# Patient Record
Sex: MALE | Race: WHITE | HISPANIC OR LATINO | URBAN - METROPOLITAN AREA
[De-identification: names, ages, dates, MRNs, and addresses within clinical notes are randomized per-mention and may not be internally consistent; named-entity substitution may affect disease eponyms.]

---

## 2017-05-26 ENCOUNTER — EMERGENCY (EMERGENCY)
Facility: HOSPITAL | Age: 42
LOS: 1 days | End: 2017-05-26
Payer: OTHER GOVERNMENT

## 2017-05-26 DIAGNOSIS — S72.90XA UNSPECIFIED FRACTURE OF UNSPECIFIED FEMUR, INITIAL ENCOUNTER FOR CLOSED FRACTURE: Chronic | ICD-10-CM

## 2017-05-26 DIAGNOSIS — Z86.79 PERSONAL HISTORY OF OTHER DISEASES OF THE CIRCULATORY SYSTEM: Chronic | ICD-10-CM

## 2017-05-26 PROCEDURE — 71020: CPT | Mod: 26

## 2017-05-26 PROCEDURE — 73030 X-RAY EXAM OF SHOULDER: CPT | Mod: 26,LT

## 2017-05-26 PROCEDURE — 99284 EMERGENCY DEPT VISIT MOD MDM: CPT

## 2017-12-20 ENCOUNTER — EMERGENCY (EMERGENCY)
Facility: HOSPITAL | Age: 42
LOS: 1 days | Discharge: HOME | End: 2017-12-20
Attending: EMERGENCY MEDICINE
Payer: MEDICAID

## 2017-12-20 ENCOUNTER — APPOINTMENT (OUTPATIENT)
Dept: NEUROLOGY | Facility: CLINIC | Age: 42
End: 2017-12-20
Payer: MEDICAID

## 2017-12-20 VITALS
RESPIRATION RATE: 20 BRPM | HEIGHT: 67 IN | HEART RATE: 103 BPM | SYSTOLIC BLOOD PRESSURE: 144 MMHG | DIASTOLIC BLOOD PRESSURE: 97 MMHG | OXYGEN SATURATION: 97 % | TEMPERATURE: 98 F | WEIGHT: 167.99 LBS

## 2017-12-20 VITALS
HEIGHT: 67 IN | BODY MASS INDEX: 26.37 KG/M2 | WEIGHT: 168 LBS | DIASTOLIC BLOOD PRESSURE: 90 MMHG | SYSTOLIC BLOOD PRESSURE: 136 MMHG

## 2017-12-20 VITALS
OXYGEN SATURATION: 98 % | DIASTOLIC BLOOD PRESSURE: 90 MMHG | SYSTOLIC BLOOD PRESSURE: 139 MMHG | RESPIRATION RATE: 20 BRPM | HEART RATE: 87 BPM

## 2017-12-20 DIAGNOSIS — S72.90XA UNSPECIFIED FRACTURE OF UNSPECIFIED FEMUR, INITIAL ENCOUNTER FOR CLOSED FRACTURE: Chronic | ICD-10-CM

## 2017-12-20 DIAGNOSIS — Z86.79 PERSONAL HISTORY OF OTHER DISEASES OF THE CIRCULATORY SYSTEM: Chronic | ICD-10-CM

## 2017-12-20 DIAGNOSIS — F17.200 NICOTINE DEPENDENCE, UNSPECIFIED, UNCOMPLICATED: ICD-10-CM

## 2017-12-20 DIAGNOSIS — G44.221 CHRONIC TENSION-TYPE HEADACHE, INTRACTABLE: ICD-10-CM

## 2017-12-20 LAB
ALBUMIN SERPL ELPH-MCNC: 4.5 G/DL — SIGNIFICANT CHANGE UP (ref 3.3–5.2)
ALP SERPL-CCNC: 78 U/L — SIGNIFICANT CHANGE UP (ref 40–120)
ALT FLD-CCNC: 23 U/L — SIGNIFICANT CHANGE UP
ANION GAP SERPL CALC-SCNC: 15 MMOL/L — SIGNIFICANT CHANGE UP (ref 5–17)
AST SERPL-CCNC: 20 U/L — SIGNIFICANT CHANGE UP
BASOPHILS # BLD AUTO: 0 K/UL — SIGNIFICANT CHANGE UP (ref 0–0.2)
BASOPHILS NFR BLD AUTO: 0.3 % — SIGNIFICANT CHANGE UP (ref 0–2)
BILIRUB SERPL-MCNC: 0.2 MG/DL — LOW (ref 0.4–2)
BUN SERPL-MCNC: 7 MG/DL — LOW (ref 8–20)
CALCIUM SERPL-MCNC: 9.2 MG/DL — SIGNIFICANT CHANGE UP (ref 8.6–10.2)
CHLORIDE SERPL-SCNC: 101 MMOL/L — SIGNIFICANT CHANGE UP (ref 98–107)
CO2 SERPL-SCNC: 27 MMOL/L — SIGNIFICANT CHANGE UP (ref 22–29)
CREAT SERPL-MCNC: 0.75 MG/DL — SIGNIFICANT CHANGE UP (ref 0.5–1.3)
EOSINOPHIL # BLD AUTO: 0.1 K/UL — SIGNIFICANT CHANGE UP (ref 0–0.5)
EOSINOPHIL NFR BLD AUTO: 1.1 % — SIGNIFICANT CHANGE UP (ref 0–5)
GLUCOSE SERPL-MCNC: 94 MG/DL — SIGNIFICANT CHANGE UP (ref 70–115)
HCT VFR BLD CALC: 46.6 % — SIGNIFICANT CHANGE UP (ref 42–52)
HGB BLD-MCNC: 16.2 G/DL — SIGNIFICANT CHANGE UP (ref 14–18)
LIDOCAIN IGE QN: 34 U/L — SIGNIFICANT CHANGE UP (ref 22–51)
LYMPHOCYTES # BLD AUTO: 2.9 K/UL — SIGNIFICANT CHANGE UP (ref 1–4.8)
LYMPHOCYTES # BLD AUTO: 28.5 % — SIGNIFICANT CHANGE UP (ref 20–55)
MCHC RBC-ENTMCNC: 31.4 PG — HIGH (ref 27–31)
MCHC RBC-ENTMCNC: 34.8 G/DL — SIGNIFICANT CHANGE UP (ref 32–36)
MCV RBC AUTO: 90.3 FL — SIGNIFICANT CHANGE UP (ref 80–94)
MONOCYTES # BLD AUTO: 0.7 K/UL — SIGNIFICANT CHANGE UP (ref 0–0.8)
MONOCYTES NFR BLD AUTO: 6.5 % — SIGNIFICANT CHANGE UP (ref 3–10)
NEUTROPHILS # BLD AUTO: 6.5 K/UL — SIGNIFICANT CHANGE UP (ref 1.8–8)
NEUTROPHILS NFR BLD AUTO: 63.4 % — SIGNIFICANT CHANGE UP (ref 37–73)
PLATELET # BLD AUTO: 317 K/UL — SIGNIFICANT CHANGE UP (ref 150–400)
POTASSIUM SERPL-MCNC: 3.8 MMOL/L — SIGNIFICANT CHANGE UP (ref 3.5–5.3)
POTASSIUM SERPL-SCNC: 3.8 MMOL/L — SIGNIFICANT CHANGE UP (ref 3.5–5.3)
PROT SERPL-MCNC: 7.3 G/DL — SIGNIFICANT CHANGE UP (ref 6.6–8.7)
RBC # BLD: 5.16 M/UL — SIGNIFICANT CHANGE UP (ref 4.6–6.2)
RBC # FLD: 13.9 % — SIGNIFICANT CHANGE UP (ref 11–15.6)
SODIUM SERPL-SCNC: 143 MMOL/L — SIGNIFICANT CHANGE UP (ref 135–145)
WBC # BLD: 10.3 K/UL — SIGNIFICANT CHANGE UP (ref 4.8–10.8)
WBC # FLD AUTO: 10.3 K/UL — SIGNIFICANT CHANGE UP (ref 4.8–10.8)

## 2017-12-20 PROCEDURE — 99283 EMERGENCY DEPT VISIT LOW MDM: CPT

## 2017-12-20 PROCEDURE — 99204 OFFICE O/P NEW MOD 45 MIN: CPT

## 2017-12-20 RX ORDER — MORPHINE SULFATE 50 MG/1
5 CAPSULE, EXTENDED RELEASE ORAL ONCE
Qty: 0 | Refills: 0 | Status: DISCONTINUED | OUTPATIENT
Start: 2017-12-20 | End: 2017-12-20

## 2017-12-20 RX ORDER — PANTOPRAZOLE SODIUM 20 MG/1
40 TABLET, DELAYED RELEASE ORAL ONCE
Qty: 0 | Refills: 0 | Status: COMPLETED | OUTPATIENT
Start: 2017-12-20 | End: 2017-12-20

## 2017-12-20 RX ORDER — SODIUM CHLORIDE 9 MG/ML
1000 INJECTION INTRAMUSCULAR; INTRAVENOUS; SUBCUTANEOUS ONCE
Qty: 0 | Refills: 0 | Status: COMPLETED | OUTPATIENT
Start: 2017-12-20 | End: 2017-12-20

## 2017-12-20 RX ORDER — KETOROLAC TROMETHAMINE 30 MG/ML
30 SYRINGE (ML) INJECTION ONCE
Qty: 0 | Refills: 0 | Status: DISCONTINUED | OUTPATIENT
Start: 2017-12-20 | End: 2017-12-20

## 2017-12-20 RX ORDER — SODIUM CHLORIDE 9 MG/ML
3 INJECTION INTRAMUSCULAR; INTRAVENOUS; SUBCUTANEOUS ONCE
Qty: 0 | Refills: 0 | Status: COMPLETED | OUTPATIENT
Start: 2017-12-20 | End: 2017-12-20

## 2017-12-20 RX ADMIN — SODIUM CHLORIDE 1000 MILLILITER(S): 9 INJECTION INTRAMUSCULAR; INTRAVENOUS; SUBCUTANEOUS at 21:35

## 2017-12-20 RX ADMIN — SODIUM CHLORIDE 3 MILLILITER(S): 9 INJECTION INTRAMUSCULAR; INTRAVENOUS; SUBCUTANEOUS at 21:35

## 2017-12-20 RX ADMIN — PANTOPRAZOLE SODIUM 40 MILLIGRAM(S): 20 TABLET, DELAYED RELEASE ORAL at 21:35

## 2017-12-20 RX ADMIN — MORPHINE SULFATE 5 MILLIGRAM(S): 50 CAPSULE, EXTENDED RELEASE ORAL at 21:35

## 2017-12-20 NOTE — ED STATDOCS - PROGRESS NOTE DETAILS
PA NOTE: Pt discussed at length with attending HPI/ROS/PE confirmed. PT seen resting computably in no acute distress in chair, pt states that his pain has increased and would like pain medication, will order Toradol and revaluate. PA NOTE: PT seen resting comfortably in no acute distress, no acute complaints at this time, PT tolerating PO intake,  PT informed of all results,  pt states that he had improvement with medication,  PT will be DC home with follow up to GI, IBU, pt educated about when to return to the ED if needed. PT verbalizes that he understands all instructions and results.

## 2017-12-20 NOTE — ED STATDOCS - OBJECTIVE STATEMENT
Pt is 43 y/o M presents to ED c/o of ABD pain x 1 week with hx of Crohns disease (2005); has been non-compliant with medications. Last flare-up was approximately 1 year ago. Pain is described as sharp. Denies N/V/D. Pt is former smoker and occasional ETOH. No further complaints at this time.  PMD: Belkys

## 2017-12-21 PROCEDURE — 83690 ASSAY OF LIPASE: CPT

## 2017-12-21 PROCEDURE — 74177 CT ABD & PELVIS W/CONTRAST: CPT

## 2017-12-21 PROCEDURE — 80053 COMPREHEN METABOLIC PANEL: CPT

## 2017-12-21 PROCEDURE — 99284 EMERGENCY DEPT VISIT MOD MDM: CPT | Mod: 25

## 2017-12-21 PROCEDURE — 74177 CT ABD & PELVIS W/CONTRAST: CPT | Mod: 26

## 2017-12-21 PROCEDURE — 36415 COLL VENOUS BLD VENIPUNCTURE: CPT

## 2017-12-21 PROCEDURE — 85027 COMPLETE CBC AUTOMATED: CPT

## 2017-12-21 PROCEDURE — 96374 THER/PROPH/DIAG INJ IV PUSH: CPT | Mod: XU

## 2017-12-21 PROCEDURE — 96375 TX/PRO/DX INJ NEW DRUG ADDON: CPT

## 2017-12-21 RX ORDER — IBUPROFEN 200 MG
1 TABLET ORAL
Qty: 15 | Refills: 0 | OUTPATIENT
Start: 2017-12-21 | End: 2017-12-25

## 2017-12-21 RX ORDER — IBUPROFEN 200 MG
1 TABLET ORAL
Qty: 15 | Refills: 0 | DISCHARGE
Start: 2017-12-21 | End: 2017-12-25

## 2017-12-21 RX ORDER — FAMOTIDINE 10 MG/ML
1 INJECTION INTRAVENOUS
Qty: 14 | Refills: 0
Start: 2017-12-21 | End: 2018-01-03

## 2017-12-21 RX ORDER — OMEPRAZOLE 10 MG/1
1 CAPSULE, DELAYED RELEASE ORAL
Qty: 21 | Refills: 0 | OUTPATIENT
Start: 2017-12-21 | End: 2018-01-10

## 2017-12-21 RX ADMIN — Medication 30 MILLIGRAM(S): at 00:37

## 2018-11-02 ENCOUNTER — INPATIENT (INPATIENT)
Facility: HOSPITAL | Age: 43
LOS: 0 days | Discharge: LEFT BEFORE DISCHARGE | DRG: 71 | End: 2018-11-02
Attending: FAMILY MEDICINE | Admitting: HOSPITALIST
Payer: MEDICAID

## 2018-11-02 VITALS
OXYGEN SATURATION: 99 % | DIASTOLIC BLOOD PRESSURE: 78 MMHG | HEART RATE: 71 BPM | TEMPERATURE: 99 F | RESPIRATION RATE: 18 BRPM | SYSTOLIC BLOOD PRESSURE: 118 MMHG

## 2018-11-02 VITALS
OXYGEN SATURATION: 97 % | RESPIRATION RATE: 18 BRPM | HEART RATE: 90 BPM | SYSTOLIC BLOOD PRESSURE: 141 MMHG | DIASTOLIC BLOOD PRESSURE: 92 MMHG | HEIGHT: 67 IN | WEIGHT: 177.91 LBS | TEMPERATURE: 98 F

## 2018-11-02 DIAGNOSIS — G45.9 TRANSIENT CEREBRAL ISCHEMIC ATTACK, UNSPECIFIED: ICD-10-CM

## 2018-11-02 DIAGNOSIS — R47.89 OTHER SPEECH DISTURBANCES: ICD-10-CM

## 2018-11-02 DIAGNOSIS — Z86.79 PERSONAL HISTORY OF OTHER DISEASES OF THE CIRCULATORY SYSTEM: Chronic | ICD-10-CM

## 2018-11-02 DIAGNOSIS — R47.01 APHASIA: ICD-10-CM

## 2018-11-02 DIAGNOSIS — S72.90XA UNSPECIFIED FRACTURE OF UNSPECIFIED FEMUR, INITIAL ENCOUNTER FOR CLOSED FRACTURE: Chronic | ICD-10-CM

## 2018-11-02 LAB
ALBUMIN SERPL ELPH-MCNC: 4.3 G/DL — SIGNIFICANT CHANGE UP (ref 3.3–5.2)
ALP SERPL-CCNC: 94 U/L — SIGNIFICANT CHANGE UP (ref 40–120)
ALT FLD-CCNC: 18 U/L — SIGNIFICANT CHANGE UP
AMMONIA BLD-MCNC: 55 UMOL/L — SIGNIFICANT CHANGE UP (ref 11–55)
ANION GAP SERPL CALC-SCNC: 10 MMOL/L — SIGNIFICANT CHANGE UP (ref 5–17)
APTT BLD: 40.1 SEC — HIGH (ref 27.5–36.3)
AST SERPL-CCNC: 18 U/L — SIGNIFICANT CHANGE UP
BASOPHILS # BLD AUTO: 0 K/UL — SIGNIFICANT CHANGE UP (ref 0–0.2)
BASOPHILS NFR BLD AUTO: 0.1 % — SIGNIFICANT CHANGE UP (ref 0–2)
BILIRUB SERPL-MCNC: <0.2 MG/DL — LOW (ref 0.4–2)
BUN SERPL-MCNC: 9 MG/DL — SIGNIFICANT CHANGE UP (ref 8–20)
CALCIUM SERPL-MCNC: 9.2 MG/DL — SIGNIFICANT CHANGE UP (ref 8.6–10.2)
CHLORIDE SERPL-SCNC: 100 MMOL/L — SIGNIFICANT CHANGE UP (ref 98–107)
CHOLEST SERPL-MCNC: 222 MG/DL — HIGH (ref 110–199)
CO2 SERPL-SCNC: 29 MMOL/L — SIGNIFICANT CHANGE UP (ref 22–29)
CREAT SERPL-MCNC: 0.91 MG/DL — SIGNIFICANT CHANGE UP (ref 0.5–1.3)
EOSINOPHIL # BLD AUTO: 0.1 K/UL — SIGNIFICANT CHANGE UP (ref 0–0.5)
EOSINOPHIL NFR BLD AUTO: 1.2 % — SIGNIFICANT CHANGE UP (ref 0–6)
ETHANOL SERPL-MCNC: <10 MG/DL — SIGNIFICANT CHANGE UP
GLUCOSE SERPL-MCNC: 111 MG/DL — SIGNIFICANT CHANGE UP (ref 70–115)
HBA1C BLD-MCNC: 5.8 % — HIGH (ref 4–5.6)
HCT VFR BLD CALC: 44.5 % — SIGNIFICANT CHANGE UP (ref 42–52)
HDLC SERPL-MCNC: 38 MG/DL — LOW
HGB BLD-MCNC: 15 G/DL — SIGNIFICANT CHANGE UP (ref 14–18)
INR BLD: 1.12 RATIO — SIGNIFICANT CHANGE UP (ref 0.88–1.16)
LIPID PNL WITH DIRECT LDL SERPL: 152 MG/DL — SIGNIFICANT CHANGE UP
LYMPHOCYTES # BLD AUTO: 2.7 K/UL — SIGNIFICANT CHANGE UP (ref 1–4.8)
LYMPHOCYTES # BLD AUTO: 26.8 % — SIGNIFICANT CHANGE UP (ref 20–55)
MCHC RBC-ENTMCNC: 30.1 PG — SIGNIFICANT CHANGE UP (ref 27–31)
MCHC RBC-ENTMCNC: 33.7 G/DL — SIGNIFICANT CHANGE UP (ref 32–36)
MCV RBC AUTO: 89.4 FL — SIGNIFICANT CHANGE UP (ref 80–94)
MONOCYTES # BLD AUTO: 0.6 K/UL — SIGNIFICANT CHANGE UP (ref 0–0.8)
MONOCYTES NFR BLD AUTO: 6 % — SIGNIFICANT CHANGE UP (ref 3–10)
NEUTROPHILS # BLD AUTO: 6.5 K/UL — SIGNIFICANT CHANGE UP (ref 1.8–8)
NEUTROPHILS NFR BLD AUTO: 65.7 % — SIGNIFICANT CHANGE UP (ref 37–73)
PLATELET # BLD AUTO: 406 K/UL — HIGH (ref 150–400)
POTASSIUM SERPL-MCNC: 4 MMOL/L — SIGNIFICANT CHANGE UP (ref 3.5–5.3)
POTASSIUM SERPL-SCNC: 4 MMOL/L — SIGNIFICANT CHANGE UP (ref 3.5–5.3)
PROT SERPL-MCNC: 7.4 G/DL — SIGNIFICANT CHANGE UP (ref 6.6–8.7)
PROTHROM AB SERPL-ACNC: 12.9 SEC — SIGNIFICANT CHANGE UP (ref 10–12.9)
RBC # BLD: 4.98 M/UL — SIGNIFICANT CHANGE UP (ref 4.6–6.2)
RBC # FLD: 13 % — SIGNIFICANT CHANGE UP (ref 11–15.6)
SODIUM SERPL-SCNC: 139 MMOL/L — SIGNIFICANT CHANGE UP (ref 135–145)
TOTAL CHOLESTEROL/HDL RATIO MEASUREMENT: 6 RATIO — SIGNIFICANT CHANGE UP (ref 3.4–9.6)
TRIGL SERPL-MCNC: 161 MG/DL — SIGNIFICANT CHANGE UP (ref 10–200)
TROPONIN T SERPL-MCNC: <0.01 NG/ML — SIGNIFICANT CHANGE UP (ref 0–0.06)
WBC # BLD: 9.9 K/UL — SIGNIFICANT CHANGE UP (ref 4.8–10.8)
WBC # FLD AUTO: 9.9 K/UL — SIGNIFICANT CHANGE UP (ref 4.8–10.8)

## 2018-11-02 PROCEDURE — 99223 1ST HOSP IP/OBS HIGH 75: CPT

## 2018-11-02 PROCEDURE — 93010 ELECTROCARDIOGRAM REPORT: CPT

## 2018-11-02 PROCEDURE — 95819 EEG AWAKE AND ASLEEP: CPT | Mod: 26

## 2018-11-02 PROCEDURE — 99285 EMERGENCY DEPT VISIT HI MDM: CPT | Mod: 25

## 2018-11-02 PROCEDURE — 70450 CT HEAD/BRAIN W/O DYE: CPT | Mod: 26

## 2018-11-02 PROCEDURE — 12345: CPT | Mod: NC

## 2018-11-02 RX ORDER — ASPIRIN/CALCIUM CARB/MAGNESIUM 324 MG
81 TABLET ORAL DAILY
Qty: 0 | Refills: 0 | Status: DISCONTINUED | OUTPATIENT
Start: 2018-11-02 | End: 2018-11-02

## 2018-11-02 RX ORDER — FAMOTIDINE 10 MG/ML
20 INJECTION INTRAVENOUS DAILY
Qty: 0 | Refills: 0 | Status: DISCONTINUED | OUTPATIENT
Start: 2018-11-02 | End: 2018-11-02

## 2018-11-02 RX ORDER — ENOXAPARIN SODIUM 100 MG/ML
40 INJECTION SUBCUTANEOUS EVERY 24 HOURS
Qty: 0 | Refills: 0 | Status: DISCONTINUED | OUTPATIENT
Start: 2018-11-02 | End: 2018-11-02

## 2018-11-02 RX ORDER — ATORVASTATIN CALCIUM 80 MG/1
40 TABLET, FILM COATED ORAL AT BEDTIME
Qty: 0 | Refills: 0 | Status: DISCONTINUED | OUTPATIENT
Start: 2018-11-02 | End: 2018-11-02

## 2018-11-02 RX ADMIN — ENOXAPARIN SODIUM 40 MILLIGRAM(S): 100 INJECTION SUBCUTANEOUS at 09:52

## 2018-11-02 RX ADMIN — Medication 81 MILLIGRAM(S): at 05:00

## 2018-11-02 NOTE — ED PROVIDER NOTE - PROGRESS NOTE DETAILS
Spoke with FLAVIA Arnold, will have tele-neurologist call back Spoke with Dr. Dannie Tracey, repeated neuro exam, does not recommend Tpa at this time. Discussed need to admit with patient & discussed risk and benefits.  Patient agreed to admission.  Discussed case w/ admitting medicine doctor - agreed to admit to their service.

## 2018-11-02 NOTE — CONSULT NOTE ADULT - ASSESSMENT
The patient is a 42y Male with episode of blacking out.     Syncope vs TIA vs seizure.   Agree with checking EEG.  Agree with MRI brain.   Continue antiplatelet.   Check lipid panel. Statin if LDL>70.    Substance use.   Agree with toxicology screen if specimen can be obtained.   Counseled to discontinue marijuana.     Case discussed with Hospitalist.

## 2018-11-02 NOTE — STROKE CODE NOTE - PROBLEM SELECTOR PLAN 1
Plan:  - Risks, benefits, and adverse reactions associated with IV tPA including hemorrhagic stroke were discussed with patient and family members in detail. No indications for IV tPA as patient on repeated occasions confirm that he is back to normal and non focal neurological examination   - Not a candidate for mechanical embolectomy due to NIHSS 0   - Aspirin for secondary stroke prevention  - Atorvastatin 80 mg q HS once able to pass the swallow evaluation   - MRI of brain without contrast/MRA head and neck with and without contrast  - Consider transthoracic echocardiogram with bubble study and telemetry to screen for possible cardiac source of embolism  - HbA1c and LDL  - Routine EEG   - Further evaluation and management as per the MRI results and neurologist evaluation at OSH     Above-mentioned plan was discussed with patient, available family member and [facility] ED MD in detail. All the questions were answered and concerns were addressed.
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
## 2018-11-02 NOTE — DISCHARGE NOTE ADULT - CARE PLAN
Principal Discharge DX:	Aphasia  Goal:	need eval with MRI  Assessment and plan of treatment:	need MRI - pt left AMA
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
## 2018-11-02 NOTE — STROKE CODE NOTE - ASSESSMENT/PLAN
42 years old man with history of Crohn's disease is evaluated at OSH for acute onset of "language disturbance.". He was reported to be normal at around 12:20 AM when he went to his room to go to bed. He called 911 due to unknown reasons. When his mother went to check on him in his room, he was found unconscious, but upon regaining his consciousness, he was noted to have confusion, disorientation and trouble getting the words out of his mouth. He was brought to [facility] for further evaluation. He reports complete resolution of his neurological symptoms. Examination through telemetry stroke is grossly nonfocal.    Impression:  his presentation is consistent with transient left hemispheric dysfunction, which could likely be related to left MCA distribution TIA (likely etiology being cerebral embolism from undetermined source, but possibility of large vessel disease needs to be ruled out) versus post ictal state from unwitnessed seizure

## 2018-11-02 NOTE — ED ADULT NURSE NOTE - NSIMPLEMENTINTERV_GEN_ALL_ED
Implemented All Universal Safety Interventions:  [place] to call system. Call bell, personal items and telephone within reach. Instruct patient to call for assistance. Room bathroom lighting operational. Non-slip footwear when patient is off stretcher. Physically safe environment: no spills, clutter or unnecessary equipment. Stretcher in lowest position, wheels locked, appropriate side rails in place.
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
## 2018-11-02 NOTE — DISCHARGE NOTE ADULT - PATIENT PORTAL LINK FT
You can access the "[employer] Patient Portal, offered by [facility], by registering with the following website: [email or web address]

## 2018-11-02 NOTE — ED ADULT TRIAGE NOTE - CHIEF COMPLAINT QUOTE
pt states does not feel right had a cigarette and woke up on floor in bedroom ems reports as for family patient was last normal at 100am  md called at bedside for stroke evaluation at 200am . gonzalez noted to all extremities equal strength ,  pt with difficulty with finding words but answer questions appropriately ,  hospital did not receive pre stroke notification by ems, ems states upon arrival patient non verbal with blank stare pt admits to having 1 drink around 11pm last

## 2018-11-02 NOTE — ED ADULT NURSE NOTE - OBJECTIVE STATEMENT
Code Team 2 activated for code stroke called on patient. Patient presents slow to respond but able to articulate words, NIH=1. Patient a&ox3, follows commands, nsr on cm. As per mother, last known well is 0020 when patient retires to bedroom. Patient states last he remember was he was just smoking a cigarette, patient later admits he was smoking weed and drinking. Patient had a PMH: Subdural hematoma. Patient states he woke up and realized he passed out, thus he called 911. Patient denies chest pain, sob, dizziness, headache, chills. Patient with no change in level of consciousness, not in distress. To continue to monitor.
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
## 2018-11-02 NOTE — H&P ADULT - NSHPPHYSICALEXAM_GEN_ALL_CORE
Vital Signs Last 24 Hrs  T(C): 36.1 (02 Nov 2018 02:37), Max: 36.8 (02 Nov 2018 02:00)  T(F): 97 (02 Nov 2018 02:37), Max: 98.3 (02 Nov 2018 02:00)  HR: 85 (02 Nov 2018 02:37) (83 - 100)  BP: 132/81 (02 Nov 2018 02:37) (132/81 - 169/94)  BP(mean): --  RR: 20 (02 Nov 2018 02:37) (18 - 20)  SpO2: 98% (02 Nov 2018 02:37) (95% - 98%)    GENERAL:  Well-appearing, not in acute distress  EYES:  Clear conjunctiva, pupils reactive to light, extraocular movement intact, slight horizontal nystagmnus, no gaze deviation   ENT: Moist mucous membranes  RESP:  Non-labored breathing pattern, lungs clear to ausculation   CV: Regular rate and rhythm, no murmurs appreciated, no lower extremity edema  GI: Soft, non-tender, non-distended  NEURO: Awake, alert, conversant, no facial asymmetry, tremor, pronator drift or dysmetria, upper and lower extremity strength 5/5, light touch sensation grossly intact on face and extremities  PSYCH: Calm, cooperative  SKIN: No rash or lesions, warm and dry
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
## 2018-11-02 NOTE — STROKE CODE NOTE - SUBJECTIVE
42 years old man was reported to be last seen normal by his mother at around 12:20 AM when he went to his room to go to bed. He reports to have called 911 thereafter but he was not sure about the reason for calling 911. Upon arrival to [facility], when his mother went to check on him in his room; he was found down on the ground. Upon regaining his consciousness, he was noted to have confusion, disorientation, and language disturbance, prompting his presentation to OSH. Since his arrival to the OSH, he reports to have complete resolution of his neurological symptoms/deficits and reports feeling back to normal.

## 2018-11-02 NOTE — H&P ADULT - NSHPLABSRESULTS_GEN_ALL_CORE
15.0   9.9   )-----------( 406      ( 02 Nov 2018 02:28 )             44.5       11-02    139  |  100  |  9.0  ----------------------------<  111  4.0   |  29.0  |  0.91    Ca    9.2      02 Nov 2018 02:28    TPro  7.4  /  Alb  4.3  /  TBili  <0.2<L>  /  DBili  x   /  AST  18  /  ALT  18  /  AlkPhos  94  11-02    EKG: NSR, HR 84, QTc 470, no ST-T changes

## 2018-11-07 LAB — DRUG SCREEN, SERUM: ABNORMAL

## 2018-12-06 NOTE — ED ADULT TRIAGE NOTE - SOURCE OF INFORMATION
Patient Purse String (Simple) Text: Given the location of the defect and the characteristics of the surrounding skin a purse string simple closure was deemed most appropriate.  Undermining was performed circumferentially around the surgical defect.  A purse string suture was then placed and tightened.

## 2019-01-16 ENCOUNTER — EMERGENCY (EMERGENCY)
Facility: HOSPITAL | Age: 44
LOS: 1 days | Discharge: LEFT BEFORE DISCHARGE | End: 2019-01-16
Attending: EMERGENCY MEDICINE
Payer: MEDICAID

## 2019-01-16 VITALS
TEMPERATURE: 98 F | RESPIRATION RATE: 20 BRPM | HEART RATE: 87 BPM | SYSTOLIC BLOOD PRESSURE: 116 MMHG | WEIGHT: 169.98 LBS | HEIGHT: 67 IN | DIASTOLIC BLOOD PRESSURE: 83 MMHG | OXYGEN SATURATION: 98 %

## 2019-01-16 DIAGNOSIS — Z86.79 PERSONAL HISTORY OF OTHER DISEASES OF THE CIRCULATORY SYSTEM: Chronic | ICD-10-CM

## 2019-01-16 DIAGNOSIS — S72.90XA UNSPECIFIED FRACTURE OF UNSPECIFIED FEMUR, INITIAL ENCOUNTER FOR CLOSED FRACTURE: Chronic | ICD-10-CM

## 2019-01-16 PROCEDURE — 99284 EMERGENCY DEPT VISIT MOD MDM: CPT | Mod: 25

## 2019-01-17 VITALS
SYSTOLIC BLOOD PRESSURE: 106 MMHG | DIASTOLIC BLOOD PRESSURE: 71 MMHG | RESPIRATION RATE: 18 BRPM | OXYGEN SATURATION: 98 % | TEMPERATURE: 98 F | HEART RATE: 71 BPM

## 2019-01-17 PROBLEM — K21.9 GASTRO-ESOPHAGEAL REFLUX DISEASE WITHOUT ESOPHAGITIS: Chronic | Status: ACTIVE | Noted: 2018-11-02

## 2019-01-17 LAB
ALBUMIN SERPL ELPH-MCNC: 4 G/DL — SIGNIFICANT CHANGE UP (ref 3.3–5.2)
ALP SERPL-CCNC: 84 U/L — SIGNIFICANT CHANGE UP (ref 40–120)
ALT FLD-CCNC: 22 U/L — SIGNIFICANT CHANGE UP
ANION GAP SERPL CALC-SCNC: 12 MMOL/L — SIGNIFICANT CHANGE UP (ref 5–17)
APTT BLD: 34.2 SEC — SIGNIFICANT CHANGE UP (ref 27.5–36.3)
AST SERPL-CCNC: 19 U/L — SIGNIFICANT CHANGE UP
BASOPHILS # BLD AUTO: 0 K/UL — SIGNIFICANT CHANGE UP (ref 0–0.2)
BASOPHILS NFR BLD AUTO: 0.2 % — SIGNIFICANT CHANGE UP (ref 0–2)
BILIRUB SERPL-MCNC: <0.2 MG/DL — LOW (ref 0.4–2)
BUN SERPL-MCNC: 12 MG/DL — SIGNIFICANT CHANGE UP (ref 8–20)
CALCIUM SERPL-MCNC: 9.1 MG/DL — SIGNIFICANT CHANGE UP (ref 8.6–10.2)
CHLORIDE SERPL-SCNC: 106 MMOL/L — SIGNIFICANT CHANGE UP (ref 98–107)
CO2 SERPL-SCNC: 26 MMOL/L — SIGNIFICANT CHANGE UP (ref 22–29)
CREAT SERPL-MCNC: 0.65 MG/DL — SIGNIFICANT CHANGE UP (ref 0.5–1.3)
EOSINOPHIL # BLD AUTO: 0.1 K/UL — SIGNIFICANT CHANGE UP (ref 0–0.5)
EOSINOPHIL NFR BLD AUTO: 1.1 % — SIGNIFICANT CHANGE UP (ref 0–6)
GLUCOSE SERPL-MCNC: 108 MG/DL — SIGNIFICANT CHANGE UP (ref 70–115)
HCT VFR BLD CALC: 46.3 % — SIGNIFICANT CHANGE UP (ref 42–52)
HGB BLD-MCNC: 15.2 G/DL — SIGNIFICANT CHANGE UP (ref 14–18)
INR BLD: 1.08 RATIO — SIGNIFICANT CHANGE UP (ref 0.88–1.16)
LACTATE BLDV-MCNC: 1 MMOL/L — SIGNIFICANT CHANGE UP (ref 0.5–2)
LIDOCAIN IGE QN: 23 U/L — SIGNIFICANT CHANGE UP (ref 22–51)
LYMPHOCYTES # BLD AUTO: 2.4 K/UL — SIGNIFICANT CHANGE UP (ref 1–4.8)
LYMPHOCYTES # BLD AUTO: 27.2 % — SIGNIFICANT CHANGE UP (ref 20–55)
MAGNESIUM SERPL-MCNC: 2.1 MG/DL — SIGNIFICANT CHANGE UP (ref 1.8–2.6)
MCHC RBC-ENTMCNC: 29.6 PG — SIGNIFICANT CHANGE UP (ref 27–31)
MCHC RBC-ENTMCNC: 32.8 G/DL — SIGNIFICANT CHANGE UP (ref 32–36)
MCV RBC AUTO: 90.1 FL — SIGNIFICANT CHANGE UP (ref 80–94)
MONOCYTES # BLD AUTO: 0.6 K/UL — SIGNIFICANT CHANGE UP (ref 0–0.8)
MONOCYTES NFR BLD AUTO: 6.3 % — SIGNIFICANT CHANGE UP (ref 3–10)
NEUTROPHILS # BLD AUTO: 5.7 K/UL — SIGNIFICANT CHANGE UP (ref 1.8–8)
NEUTROPHILS NFR BLD AUTO: 65.1 % — SIGNIFICANT CHANGE UP (ref 37–73)
PLATELET # BLD AUTO: 325 K/UL — SIGNIFICANT CHANGE UP (ref 150–400)
POTASSIUM SERPL-MCNC: 3.6 MMOL/L — SIGNIFICANT CHANGE UP (ref 3.5–5.3)
POTASSIUM SERPL-SCNC: 3.6 MMOL/L — SIGNIFICANT CHANGE UP (ref 3.5–5.3)
PROT SERPL-MCNC: 7.1 G/DL — SIGNIFICANT CHANGE UP (ref 6.6–8.7)
PROTHROM AB SERPL-ACNC: 12.4 SEC — SIGNIFICANT CHANGE UP (ref 10–12.9)
RBC # BLD: 5.14 M/UL — SIGNIFICANT CHANGE UP (ref 4.6–6.2)
RBC # FLD: 13.4 % — SIGNIFICANT CHANGE UP (ref 11–15.6)
SODIUM SERPL-SCNC: 144 MMOL/L — SIGNIFICANT CHANGE UP (ref 135–145)
WBC # BLD: 8.8 K/UL — SIGNIFICANT CHANGE UP (ref 4.8–10.8)
WBC # FLD AUTO: 8.8 K/UL — SIGNIFICANT CHANGE UP (ref 4.8–10.8)

## 2019-01-17 PROCEDURE — 36415 COLL VENOUS BLD VENIPUNCTURE: CPT

## 2019-01-17 PROCEDURE — 96374 THER/PROPH/DIAG INJ IV PUSH: CPT | Mod: XU

## 2019-01-17 PROCEDURE — 83735 ASSAY OF MAGNESIUM: CPT

## 2019-01-17 PROCEDURE — 80053 COMPREHEN METABOLIC PANEL: CPT

## 2019-01-17 PROCEDURE — 85730 THROMBOPLASTIN TIME PARTIAL: CPT

## 2019-01-17 PROCEDURE — 99284 EMERGENCY DEPT VISIT MOD MDM: CPT | Mod: 25

## 2019-01-17 PROCEDURE — 83690 ASSAY OF LIPASE: CPT

## 2019-01-17 PROCEDURE — 85610 PROTHROMBIN TIME: CPT

## 2019-01-17 PROCEDURE — 83605 ASSAY OF LACTIC ACID: CPT

## 2019-01-17 PROCEDURE — 85027 COMPLETE CBC AUTOMATED: CPT

## 2019-01-17 RX ORDER — SODIUM CHLORIDE 9 MG/ML
1000 INJECTION INTRAMUSCULAR; INTRAVENOUS; SUBCUTANEOUS ONCE
Qty: 0 | Refills: 0 | Status: COMPLETED | OUTPATIENT
Start: 2019-01-17 | End: 2019-01-17

## 2019-01-17 RX ORDER — KETOROLAC TROMETHAMINE 30 MG/ML
30 SYRINGE (ML) INJECTION ONCE
Qty: 0 | Refills: 0 | Status: DISCONTINUED | OUTPATIENT
Start: 2019-01-17 | End: 2019-01-17

## 2019-01-17 RX ADMIN — SODIUM CHLORIDE 1000 MILLILITER(S): 9 INJECTION INTRAMUSCULAR; INTRAVENOUS; SUBCUTANEOUS at 03:33

## 2019-01-17 RX ADMIN — Medication 30 MILLIGRAM(S): at 04:00

## 2019-01-17 NOTE — ED PROVIDER NOTE - OBJECTIVE STATEMENT
A 43 year old male pt with a hx of Crohn's disease presents to the ED c/o abdominal pain. Pt states that for the past 3 days he has been experiencing sharp LLQ pain that is similar to prior Crohn's flare up. He has had mild diarrhea but no vomiting or nausea and has been tolerating PO well. Pt has an appointment with his gastroenterologist, Dr. Rosenstein, in two weeks. He has not had any recent fevers, chills. No further complaints at this time.

## 2019-01-17 NOTE — ED ADULT NURSE NOTE - OBJECTIVE STATEMENT
pt alert and oriented x4 came in c/o left lower abdominal pain. pt verbalizes diarhea but denies n/v. VSS afebrile. pt states he has history of crohns and this feels like a flare up. respirations even unlabored. pt educated on plan of care, pt able to successfully teach back plan of care to RN, RN will continue to reeducate pt during hospital stay.
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
## 2019-01-17 NOTE — ED PROVIDER NOTE - PHYSICAL EXAMINATION
Constitutional : Pt appears uncomfortable, holding LLQ  Head :NC AT , no swelling  Eyes :eomi, no swelling  Mouth :mm appear dry   Neck : supple, trachea in midline  Chest :Navi air entry, symm chest expansion, no distress  Heart :S1 S2 distant  Abdomen :abd soft, pt able to palpate self in LLQ to demonstrate pain. No guarding or rebound  Musc/Skel :ext no swelling, no deformity, no spine tenderness, distal pulses present  Neuro  :AAO 3 no focal deficits

## 2019-01-17 NOTE — ED PROVIDER NOTE - NS ED ROS FT
no weight change, no fever or chills  no recent travel, no recent abox, no sick contacts  no recent change in medications  no rash, no bruises  no visual changes no eye discharge  no cough cold or congestion,   no sob, no chest pain  no orthopnea, no pnd  + ABDOMINAL PAIN  no hematuria, no change in urinary habits  no joint pain, no deformity  no headache, no paresthesia

## 2019-01-17 NOTE — ED PROVIDER NOTE - MEDICAL DECISION MAKING DETAILS
A 43 year old male pt presents to the ED c/o abdominal pain. A 43 year old male pt presents to the ED c/o abdominal pain. Will check labs, CT a/p, UA, and re-evaluate.

## 2019-01-17 NOTE — ED ADULT NURSE NOTE - NSIMPLEMENTINTERV_GEN_ALL_ED
Implemented All Universal Safety Interventions:  [place] to call system. Call bell, personal items and telephone within reach. Instruct patient to call for assistance. Room bathroom lighting operational. Non-slip footwear when patient is off stretcher. Physically safe environment: no spills, clutter or unnecessary equipment. Stretcher in lowest position, wheels locked, appropriate side rails in place.

## 2019-01-17 NOTE — ED ADULT NURSE REASSESSMENT NOTE - NS ED NURSE REASSESS COMMENT FT1
code flight called 0452, pt noted to not be in bed. NO IV noted in bedroom. pt left hospital with IV In place. [facility] called [phone] to make report. MD, charge RN and supervisor made aware.

## 2019-04-11 NOTE — ED ADULT TRIAGE NOTE - WEIGHT IN LBS
70 yo woman with a hx of a THR in December presents s/p fall at home. Patient found to have a left periprosthetic fx. scheduled for Open reduction and internal fixation of fx. 167.9

## 2019-07-10 PROCEDURE — 83036 HEMOGLOBIN GLYCOSYLATED A1C: CPT

## 2019-07-10 PROCEDURE — 85730 THROMBOPLASTIN TIME PARTIAL: CPT

## 2019-07-10 PROCEDURE — 99285 EMERGENCY DEPT VISIT HI MDM: CPT

## 2019-07-10 PROCEDURE — 80053 COMPREHEN METABOLIC PANEL: CPT

## 2019-07-10 PROCEDURE — 80307 DRUG TEST PRSMV CHEM ANLYZR: CPT

## 2019-07-10 PROCEDURE — 85027 COMPLETE CBC AUTOMATED: CPT

## 2019-07-10 PROCEDURE — 80061 LIPID PANEL: CPT

## 2019-07-10 PROCEDURE — 82140 ASSAY OF AMMONIA: CPT

## 2019-07-10 PROCEDURE — 95819 EEG AWAKE AND ASLEEP: CPT

## 2019-07-10 PROCEDURE — 36415 COLL VENOUS BLD VENIPUNCTURE: CPT

## 2019-07-10 PROCEDURE — 93005 ELECTROCARDIOGRAM TRACING: CPT

## 2019-07-10 PROCEDURE — 84484 ASSAY OF TROPONIN QUANT: CPT

## 2019-07-10 PROCEDURE — 70450 CT HEAD/BRAIN W/O DYE: CPT

## 2019-07-10 PROCEDURE — G0378: CPT

## 2019-07-10 PROCEDURE — 82962 GLUCOSE BLOOD TEST: CPT

## 2019-07-10 PROCEDURE — 85610 PROTHROMBIN TIME: CPT

## 2020-11-16 ENCOUNTER — EMERGENCY (EMERGENCY)
Facility: HOSPITAL | Age: 45
LOS: 1 days | Discharge: HOME | End: 2020-11-16
Attending: EMERGENCY MEDICINE
Payer: MEDICAID

## 2020-11-16 VITALS
DIASTOLIC BLOOD PRESSURE: 108 MMHG | SYSTOLIC BLOOD PRESSURE: 155 MMHG | RESPIRATION RATE: 20 BRPM | TEMPERATURE: 98 F | OXYGEN SATURATION: 98 % | HEART RATE: 102 BPM | HEIGHT: 67 IN | WEIGHT: 164.91 LBS

## 2020-11-16 VITALS — DIASTOLIC BLOOD PRESSURE: 80 MMHG | OXYGEN SATURATION: 99 % | SYSTOLIC BLOOD PRESSURE: 120 MMHG | HEART RATE: 85 BPM

## 2020-11-16 DIAGNOSIS — S72.90XA UNSPECIFIED FRACTURE OF UNSPECIFIED FEMUR, INITIAL ENCOUNTER FOR CLOSED FRACTURE: Chronic | ICD-10-CM

## 2020-11-16 DIAGNOSIS — Z86.79 PERSONAL HISTORY OF OTHER DISEASES OF THE CIRCULATORY SYSTEM: Chronic | ICD-10-CM

## 2020-11-16 LAB
BASOPHILS # BLD AUTO: 0.03 K/UL — SIGNIFICANT CHANGE UP (ref 0–0.2)
BASOPHILS NFR BLD AUTO: 0.4 % — SIGNIFICANT CHANGE UP (ref 0–2)
EOSINOPHIL # BLD AUTO: 0.09 K/UL — SIGNIFICANT CHANGE UP (ref 0–0.5)
EOSINOPHIL NFR BLD AUTO: 1.1 % — SIGNIFICANT CHANGE UP (ref 0–6)
HCT VFR BLD CALC: 41.9 % — SIGNIFICANT CHANGE UP (ref 39–50)
HGB BLD-MCNC: 14.1 G/DL — SIGNIFICANT CHANGE UP (ref 13–17)
IMM GRANULOCYTES NFR BLD AUTO: 0.3 % — SIGNIFICANT CHANGE UP (ref 0–1.5)
LYMPHOCYTES # BLD AUTO: 2.47 K/UL — SIGNIFICANT CHANGE UP (ref 1–3.3)
LYMPHOCYTES # BLD AUTO: 31.2 % — SIGNIFICANT CHANGE UP (ref 13–44)
MCHC RBC-ENTMCNC: 30.1 PG — SIGNIFICANT CHANGE UP (ref 27–34)
MCHC RBC-ENTMCNC: 33.7 GM/DL — SIGNIFICANT CHANGE UP (ref 32–36)
MCV RBC AUTO: 89.5 FL — SIGNIFICANT CHANGE UP (ref 80–100)
MONOCYTES # BLD AUTO: 0.53 K/UL — SIGNIFICANT CHANGE UP (ref 0–0.9)
MONOCYTES NFR BLD AUTO: 6.7 % — SIGNIFICANT CHANGE UP (ref 2–14)
NEUTROPHILS # BLD AUTO: 4.78 K/UL — SIGNIFICANT CHANGE UP (ref 1.8–7.4)
NEUTROPHILS NFR BLD AUTO: 60.3 % — SIGNIFICANT CHANGE UP (ref 43–77)
PLATELET # BLD AUTO: 301 K/UL — SIGNIFICANT CHANGE UP (ref 150–400)
RBC # BLD: 4.68 M/UL — SIGNIFICANT CHANGE UP (ref 4.2–5.8)
RBC # FLD: 12.5 % — SIGNIFICANT CHANGE UP (ref 10.3–14.5)
WBC # BLD: 7.92 K/UL — SIGNIFICANT CHANGE UP (ref 3.8–10.5)
WBC # FLD AUTO: 7.92 K/UL — SIGNIFICANT CHANGE UP (ref 3.8–10.5)

## 2020-11-16 PROCEDURE — 99284 EMERGENCY DEPT VISIT MOD MDM: CPT

## 2020-11-16 RX ORDER — KETOROLAC TROMETHAMINE 30 MG/ML
30 SYRINGE (ML) INJECTION ONCE
Refills: 0 | Status: DISCONTINUED | OUTPATIENT
Start: 2020-11-16 | End: 2020-11-16

## 2020-11-16 RX ORDER — OFLOXACIN 0.3 %
1 DROPS OPHTHALMIC (EYE)
Refills: 0 | Status: DISCONTINUED | OUTPATIENT
Start: 2020-11-16 | End: 2020-11-16

## 2020-11-16 RX ORDER — MORPHINE SULFATE 50 MG/1
4 CAPSULE, EXTENDED RELEASE ORAL ONCE
Refills: 0 | Status: DISCONTINUED | OUTPATIENT
Start: 2020-11-16 | End: 2020-11-16

## 2020-11-16 RX ORDER — SODIUM CHLORIDE 9 MG/ML
1000 INJECTION INTRAMUSCULAR; INTRAVENOUS; SUBCUTANEOUS ONCE
Refills: 0 | Status: COMPLETED | OUTPATIENT
Start: 2020-11-16 | End: 2020-11-16

## 2020-11-16 RX ADMIN — MORPHINE SULFATE 4 MILLIGRAM(S): 50 CAPSULE, EXTENDED RELEASE ORAL at 23:52

## 2020-11-16 RX ADMIN — Medication 40 MILLIGRAM(S): at 23:25

## 2020-11-16 RX ADMIN — SODIUM CHLORIDE 1000 MILLILITER(S): 9 INJECTION INTRAMUSCULAR; INTRAVENOUS; SUBCUTANEOUS at 23:26

## 2020-11-16 RX ADMIN — Medication 30 MILLIGRAM(S): at 23:25

## 2020-11-16 NOTE — ED ADULT NURSE NOTE - OBJECTIVE STATEMENT
Pt in no apparent distress at this time. Airway patent, breathing spontaneous and nonlabored. Pt A&Ox3 resting in stretcher. Pt c/o abdominal pain and nausea.  pt has passed some loose BMs. denies urinary issues. pt has chrons, last flare up around 8 months.

## 2020-11-16 NOTE — ED PROVIDER NOTE - NS ED ATTENDING STATEMENT MOD
I have personally performed a face to face diagnostic evaluation on this patient. I have reviewed the ACP note and agree with the history, exam and plan of care, except as noted. Spontaneous, unlabored and symmetrical

## 2020-11-16 NOTE — ED PROVIDER NOTE - CLINICAL SUMMARY MEDICAL DECISION MAKING FREE TEXT BOX
45 yo male PMHx Crohn's Disease (no daily medication), GERD, ?SBO (no surgical intervention) presents to ED c/o abdominal pain x4 days. Patient states pain similar to previous Crohn's flares. Benign abdominal exam. Plan: labs, meds, reassess.

## 2020-11-16 NOTE — ED ADULT NURSE NOTE - NSIMPLEMENTINTERV_GEN_ALL_ED
Implemented All Universal Safety Interventions:  [place] to call system. Call bell, personal items and telephone within reach. Instruct patient to call for assistance. Room bathroom lighting operational. Non-slip footwear when patient is off stretcher. Physically safe environment: no spills, clutter or unnecessary equipment. Stretcher in lowest position, wheels locked, appropriate side rails in place.

## 2020-11-16 NOTE — ED PROVIDER NOTE - NSFOLLOWUPINSTRUCTIONS_ED_ALL_ED_FT
- Prescription sent to pharmacy.  - Increase fluids.   - Please bring all documentation from your ED visit to any related future follow up appointment.  - Please call to schedule follow up appointment with your primary care physician within 24-48 hours.  - Please seek immediate medical attention and return to ED for any new/worsening, signs/symptoms, or concerns, including but not limited to worsening abdominal pain, unable to pass flatus/stool, overall worsening.    Feel better!       Acute Abdominal Pain    WHAT YOU NEED TO KNOW:    The cause of your abdominal pain may not be found. If a cause is found, treatment will depend on what the cause is.     DISCHARGE INSTRUCTIONS:    Seek care immediately if:   •You vomit blood or cannot stop vomiting.      •You have blood in your bowel movement or it looks like tar.       •You have bleeding from your rectum.       •Your abdomen is larger than usual, more painful, and hard.       •You have severe pain in your abdomen.       •You stop passing gas and having bowel movements.       •You feel weak, dizzy, or faint.      Contact your healthcare provider if:   •You have a fever.      •You have new signs and symptoms.      •Your symptoms do not get better with treatment.       •You have questions or concerns about your condition or care.      Medicines may be given to decrease pain, treat an infection, and manage your symptoms. Take your medicine as directed. Call your healthcare provider if you think your medicine is not helping or if you have side effects. Tell him if you are allergic to any medicine. Keep a list of the medicines, vitamins, and herbs you take. Include the amounts, and when and why you take them. Bring the list or the pill bottles to follow-up visits. Carry your medicine list with you in case of an emergency.    Manage your symptoms:   •Apply heat on your abdomen for 20 to 30 minutes every 2 hours for as many days as directed. Heat helps decrease pain and muscle spasms.       •Manage your stress. Stress may cause abdominal pain. Your healthcare provider may recommend relaxation techniques and deep breathing exercises to help decrease your stress. Your healthcare provider may recommend you talk to someone about your stress or anxiety, such as a counselor or a trusted friend. Get plenty of sleep and exercise regularly.       •Limit or do not drink alcohol. Alcohol can make your abdominal pain worse. Ask your healthcare provider if it is safe for you to drink alcohol. Also ask how much is safe for you to drink.       •Do not smoke. Nicotine and other chemicals in cigarettes can damage your esophagus and stomach. Ask your healthcare provider for information if you currently smoke and need help to quit. E-cigarettes or smokeless tobacco still contain nicotine. Talk to your healthcare provider before you use these products.       Make changes to the food you eat as directed: Do not eat foods that cause abdominal pain or other symptoms. Eat small meals more often.   •Eat more high-fiber foods if you are constipated. High-fiber foods include fruits, vegetables, whole-grain foods, and legumes.       •Do not eat foods that cause gas if you have bloating. Examples include broccoli, cabbage, and cauliflower. Do not drink soda or carbonated drinks, because these may also cause gas.       •Do not eat foods or drinks that contain sorbitol or fructose if you have diarrhea and bloating. Some examples are fruit juices, candy, jelly, and sugar-free gum.       •Do not eat high-fat foods, such as fried foods, cheeseburgers, hot dogs, and desserts.      •Limit or do not drink caffeine. Caffeine may make symptoms, such as heart burn or nausea, worse.       •Drink plenty of liquids to prevent dehydration from diarrhea or vomiting. Ask your healthcare provider how much liquid to drink each day and which liquids are best for you.       Follow up with your healthcare provider as directed: Write down your questions so you remember to ask them during your visits.

## 2020-11-16 NOTE — ED PROVIDER NOTE - OBJECTIVE STATEMENT
43 yo male PMHx Crohn's Disease (no daily medication), GERD, SBO presents to ED c/o abdominal pain x4 days. Did not self medicate PTA. Pain has been constant to LLQ which is normal for flare. Patient passing gas, with lose stools. Decreased PO intake which is normal for flare up as per patient. Last flare >8 months. Normally when flare occurs patient wait for it to pass. Patient states pain similar to previous flares. No further complaints at this time.   Denies hx diverticulitis, adnominal surgeries, fever.   GI: None 43 yo male PMHx Crohn's Disease (no daily medication), GERD, ?SBO (no surgical intervention) presents to ED c/o abdominal pain x4 days. Did not self medicate PTA. Pain has been constant to LLQ which is normal for flare. Patient passing gas, with lose stools. Decreased PO intake which is normal for flare up as per patient. Last flare >8 months, last CT months ago. Normally when flare occurs patient waits for it to pass. Patient states pain similar to previous flares. No further complaints at this time.   Denies hx diverticulitis, adnominal surgeries, fever.   GI: None

## 2020-11-16 NOTE — ED PROVIDER NOTE - CARE PROVIDER_API CALL
Héctor Iverson  GASTROENTEROLOGY  [address], [address]  [place] [address]  Phone: [phone]  Fax: [phone]  Follow Up Time:     [place] [address]  Phone: [phone]  Fax: [phone]  Follow Up Time:

## 2020-11-16 NOTE — ED PROVIDER NOTE - ATTENDING CONTRIBUTION TO CARE
I personally saw the patient with the PA, and completed the key components of the history and physical exam. I then discussed the management plan with the PA.   gen in nad resp clear cardiac no murmur abd mild ttp all 4 quadrants no g/r/r no cvat bs nml neuro intact

## 2020-11-16 NOTE — ED PROVIDER NOTE - PHYSICAL EXAMINATION
General: In NAD, non-toxic appearing; well nourished/developed.  Skin: No rashes or lesions. Warm, dry, color normal for race.   Head: Normocephalic/atraumatic.  Eyes: Sclera anicteric, conjunctivae clear b/l. No discharge. PERRLA, EOMI.  Neck: Supple, FROM.   Cardio: No lifts, heaves, visible pulsations. No thrills. Rate and rhythm regular, S1 & S2 clear. No audible murmur, gallop, or rub.  PV: Pulses: b/l 2+ radial, DP, PT. Capillary refill <2 seconds.  Resp: Normal AP to lateral diameter, symmetrical excursion b/l. Breath sounds vesicular, symmetrical and without rales, rhonchi or wheezing b/l.  Abd: Non-distended. No scars. Soft, LLQ TTP, no masses palpated. No rebound, guarding. No McBurney's point tenderness. No CVA tenderness.  Neuro: A&Ox3. CN II-XII grossly intact.  Psych: Normal mood and affect. No apparent risk to self or others. General: In NAD, non-toxic appearing; well nourished/developed.  Skin: No rashes or lesions. Warm, dry, color normal for race.   Head: Normocephalic/atraumatic.  Eyes: Sclera anicteric, conjunctivae clear b/l. No discharge. PERRLA, EOMI.  Neck: Supple, FROM.   Cardio: No lifts, heaves, visible pulsations. No thrills. Rate and rhythm regular, S1 & S2 clear. No audible murmur, gallop, or rub.  PV: Pulses: b/l 2+ radial, DP, PT. Capillary refill <2 seconds.  Resp: Normal AP to lateral diameter, symmetrical excursion b/l. Breath sounds vesicular, symmetrical and without rales, rhonchi or wheezing b/l.  Abd: Non-distended. No scars. Soft, mild LLQ TTP, no masses palpated. No rebound, guarding. No McBurney's point tenderness. No CVA tenderness.  Neuro: A&Ox3. CN II-XII grossly intact.  Psych: Normal mood and affect. No apparent risk to self or others.

## 2020-11-16 NOTE — ED PROVIDER NOTE - PATIENT PORTAL LINK FT
You can access the FollowMyHealth Patient Portal offered by [facility] by registering at the following website: [email or web address]. By joining [employer]’s FollowMyHealth portal, you will also be able to view your health information using other applications (apps) compatible with our system.

## 2020-11-16 NOTE — ED ADULT TRIAGE NOTE - CHIEF COMPLAINT QUOTE
Patient c/o generalized abd pain x2 days with n/d without vomiting. hx chrons per patient. denies chest pain or shortness of breath.

## 2020-11-17 LAB
ALBUMIN SERPL ELPH-MCNC: 3.7 G/DL — SIGNIFICANT CHANGE UP (ref 3.3–5.2)
ALP SERPL-CCNC: 73 U/L — SIGNIFICANT CHANGE UP (ref 40–120)
ALT FLD-CCNC: 18 U/L — SIGNIFICANT CHANGE UP
ANION GAP SERPL CALC-SCNC: 10 MMOL/L — SIGNIFICANT CHANGE UP (ref 5–17)
AST SERPL-CCNC: 16 U/L — SIGNIFICANT CHANGE UP
BILIRUB SERPL-MCNC: <0.2 MG/DL — LOW (ref 0.4–2)
BUN SERPL-MCNC: 14 MG/DL — SIGNIFICANT CHANGE UP (ref 8–20)
CALCIUM SERPL-MCNC: 8.8 MG/DL — SIGNIFICANT CHANGE UP (ref 8.6–10.2)
CHLORIDE SERPL-SCNC: 104 MMOL/L — SIGNIFICANT CHANGE UP (ref 98–107)
CO2 SERPL-SCNC: 27 MMOL/L — SIGNIFICANT CHANGE UP (ref 22–29)
CREAT SERPL-MCNC: 0.9 MG/DL — SIGNIFICANT CHANGE UP (ref 0.5–1.3)
GLUCOSE SERPL-MCNC: 109 MG/DL — HIGH (ref 70–99)
LIDOCAIN IGE QN: 54 U/L — HIGH (ref 22–51)
POTASSIUM SERPL-MCNC: 3.6 MMOL/L — SIGNIFICANT CHANGE UP (ref 3.5–5.3)
POTASSIUM SERPL-SCNC: 3.6 MMOL/L — SIGNIFICANT CHANGE UP (ref 3.5–5.3)
PROT SERPL-MCNC: 6.4 G/DL — LOW (ref 6.6–8.7)
SODIUM SERPL-SCNC: 141 MMOL/L — SIGNIFICANT CHANGE UP (ref 135–145)

## 2020-11-17 PROCEDURE — 96374 THER/PROPH/DIAG INJ IV PUSH: CPT

## 2020-11-17 PROCEDURE — 80053 COMPREHEN METABOLIC PANEL: CPT

## 2020-11-17 PROCEDURE — 36415 COLL VENOUS BLD VENIPUNCTURE: CPT

## 2020-11-17 PROCEDURE — 96376 TX/PRO/DX INJ SAME DRUG ADON: CPT

## 2020-11-17 PROCEDURE — 96375 TX/PRO/DX INJ NEW DRUG ADDON: CPT

## 2020-11-17 PROCEDURE — 83690 ASSAY OF LIPASE: CPT

## 2020-11-17 PROCEDURE — 85025 COMPLETE CBC W/AUTO DIFF WBC: CPT

## 2020-11-17 PROCEDURE — 99284 EMERGENCY DEPT VISIT MOD MDM: CPT | Mod: 25

## 2020-11-17 RX ORDER — MORPHINE SULFATE 50 MG/1
4 CAPSULE, EXTENDED RELEASE ORAL ONCE
Refills: 0 | Status: DISCONTINUED | OUTPATIENT
Start: 2020-11-17 | End: 2020-11-17

## 2020-11-17 RX ADMIN — MORPHINE SULFATE 4 MILLIGRAM(S): 50 CAPSULE, EXTENDED RELEASE ORAL at 00:47

## 2021-06-11 ENCOUNTER — EMERGENCY (EMERGENCY)
Facility: HOSPITAL | Age: 46
LOS: 1 days | Discharge: HOME | End: 2021-06-11
Attending: EMERGENCY MEDICINE
Payer: MEDICAID

## 2021-06-11 VITALS
SYSTOLIC BLOOD PRESSURE: 115 MMHG | TEMPERATURE: 98 F | OXYGEN SATURATION: 98 % | DIASTOLIC BLOOD PRESSURE: 82 MMHG | RESPIRATION RATE: 18 BRPM | HEART RATE: 82 BPM

## 2021-06-11 VITALS
DIASTOLIC BLOOD PRESSURE: 71 MMHG | WEIGHT: 175.05 LBS | SYSTOLIC BLOOD PRESSURE: 117 MMHG | OXYGEN SATURATION: 99 % | RESPIRATION RATE: 18 BRPM | HEART RATE: 119 BPM | TEMPERATURE: 98 F | HEIGHT: 67 IN

## 2021-06-11 DIAGNOSIS — Z86.79 PERSONAL HISTORY OF OTHER DISEASES OF THE CIRCULATORY SYSTEM: Chronic | ICD-10-CM

## 2021-06-11 DIAGNOSIS — S72.90XA UNSPECIFIED FRACTURE OF UNSPECIFIED FEMUR, INITIAL ENCOUNTER FOR CLOSED FRACTURE: Chronic | ICD-10-CM

## 2021-06-11 LAB
ALBUMIN SERPL ELPH-MCNC: 4 G/DL — SIGNIFICANT CHANGE UP (ref 3.3–5.2)
ALP SERPL-CCNC: 84 U/L — SIGNIFICANT CHANGE UP (ref 40–120)
ALT FLD-CCNC: 18 U/L — SIGNIFICANT CHANGE UP
ANION GAP SERPL CALC-SCNC: 10 MMOL/L — SIGNIFICANT CHANGE UP (ref 5–17)
AST SERPL-CCNC: 22 U/L — SIGNIFICANT CHANGE UP
BASOPHILS # BLD AUTO: 0.06 K/UL — SIGNIFICANT CHANGE UP (ref 0–0.2)
BASOPHILS NFR BLD AUTO: 0.7 % — SIGNIFICANT CHANGE UP (ref 0–2)
BILIRUB SERPL-MCNC: <0.2 MG/DL — LOW (ref 0.4–2)
BUN SERPL-MCNC: 9.2 MG/DL — SIGNIFICANT CHANGE UP (ref 8–20)
CALCIUM SERPL-MCNC: 9.3 MG/DL — SIGNIFICANT CHANGE UP (ref 8.6–10.2)
CHLORIDE SERPL-SCNC: 104 MMOL/L — SIGNIFICANT CHANGE UP (ref 98–107)
CO2 SERPL-SCNC: 28 MMOL/L — SIGNIFICANT CHANGE UP (ref 22–29)
CREAT SERPL-MCNC: 0.7 MG/DL — SIGNIFICANT CHANGE UP (ref 0.5–1.3)
EOSINOPHIL # BLD AUTO: 0.28 K/UL — SIGNIFICANT CHANGE UP (ref 0–0.5)
EOSINOPHIL NFR BLD AUTO: 3.1 % — SIGNIFICANT CHANGE UP (ref 0–6)
GLUCOSE SERPL-MCNC: 113 MG/DL — HIGH (ref 70–99)
HCT VFR BLD CALC: 41.8 % — SIGNIFICANT CHANGE UP (ref 39–50)
HGB BLD-MCNC: 13.7 G/DL — SIGNIFICANT CHANGE UP (ref 13–17)
IMM GRANULOCYTES NFR BLD AUTO: 0.2 % — SIGNIFICANT CHANGE UP (ref 0–1.5)
LIDOCAIN IGE QN: 21 U/L — LOW (ref 22–51)
LYMPHOCYTES # BLD AUTO: 2.59 K/UL — SIGNIFICANT CHANGE UP (ref 1–3.3)
LYMPHOCYTES # BLD AUTO: 28.5 % — SIGNIFICANT CHANGE UP (ref 13–44)
MCHC RBC-ENTMCNC: 29.4 PG — SIGNIFICANT CHANGE UP (ref 27–34)
MCHC RBC-ENTMCNC: 32.8 GM/DL — SIGNIFICANT CHANGE UP (ref 32–36)
MCV RBC AUTO: 89.7 FL — SIGNIFICANT CHANGE UP (ref 80–100)
MONOCYTES # BLD AUTO: 0.51 K/UL — SIGNIFICANT CHANGE UP (ref 0–0.9)
MONOCYTES NFR BLD AUTO: 5.6 % — SIGNIFICANT CHANGE UP (ref 2–14)
NEUTROPHILS # BLD AUTO: 5.62 K/UL — SIGNIFICANT CHANGE UP (ref 1.8–7.4)
NEUTROPHILS NFR BLD AUTO: 61.9 % — SIGNIFICANT CHANGE UP (ref 43–77)
PLATELET # BLD AUTO: 295 K/UL — SIGNIFICANT CHANGE UP (ref 150–400)
POTASSIUM SERPL-MCNC: 4.2 MMOL/L — SIGNIFICANT CHANGE UP (ref 3.5–5.3)
POTASSIUM SERPL-SCNC: 4.2 MMOL/L — SIGNIFICANT CHANGE UP (ref 3.5–5.3)
PROT SERPL-MCNC: 7.1 G/DL — SIGNIFICANT CHANGE UP (ref 6.6–8.7)
RBC # BLD: 4.66 M/UL — SIGNIFICANT CHANGE UP (ref 4.2–5.8)
RBC # FLD: 13.3 % — SIGNIFICANT CHANGE UP (ref 10.3–14.5)
SODIUM SERPL-SCNC: 142 MMOL/L — SIGNIFICANT CHANGE UP (ref 135–145)
WBC # BLD: 9.08 K/UL — SIGNIFICANT CHANGE UP (ref 3.8–10.5)
WBC # FLD AUTO: 9.08 K/UL — SIGNIFICANT CHANGE UP (ref 3.8–10.5)

## 2021-06-11 PROCEDURE — 80053 COMPREHEN METABOLIC PANEL: CPT

## 2021-06-11 PROCEDURE — 85025 COMPLETE CBC W/AUTO DIFF WBC: CPT

## 2021-06-11 PROCEDURE — 83690 ASSAY OF LIPASE: CPT

## 2021-06-11 PROCEDURE — 99284 EMERGENCY DEPT VISIT MOD MDM: CPT | Mod: 25

## 2021-06-11 PROCEDURE — 74177 CT ABD & PELVIS W/CONTRAST: CPT | Mod: 26,MA

## 2021-06-11 PROCEDURE — 99285 EMERGENCY DEPT VISIT HI MDM: CPT

## 2021-06-11 PROCEDURE — 74177 CT ABD & PELVIS W/CONTRAST: CPT

## 2021-06-11 PROCEDURE — 36415 COLL VENOUS BLD VENIPUNCTURE: CPT

## 2021-06-11 RX ORDER — MORPHINE SULFATE 50 MG/1
4 CAPSULE, EXTENDED RELEASE ORAL ONCE
Refills: 0 | Status: DISCONTINUED | OUTPATIENT
Start: 2021-06-11 | End: 2021-06-11

## 2021-06-11 RX ORDER — SODIUM CHLORIDE 9 MG/ML
1000 INJECTION INTRAMUSCULAR; INTRAVENOUS; SUBCUTANEOUS ONCE
Refills: 0 | Status: COMPLETED | OUTPATIENT
Start: 2021-06-11 | End: 2021-06-11

## 2021-06-11 RX ADMIN — MORPHINE SULFATE 4 MILLIGRAM(S): 50 CAPSULE, EXTENDED RELEASE ORAL at 06:51

## 2021-06-11 RX ADMIN — MORPHINE SULFATE 4 MILLIGRAM(S): 50 CAPSULE, EXTENDED RELEASE ORAL at 04:46

## 2021-06-11 RX ADMIN — MORPHINE SULFATE 4 MILLIGRAM(S): 50 CAPSULE, EXTENDED RELEASE ORAL at 06:50

## 2021-06-11 RX ADMIN — SODIUM CHLORIDE 1000 MILLILITER(S): 9 INJECTION INTRAMUSCULAR; INTRAVENOUS; SUBCUTANEOUS at 04:46

## 2021-06-11 NOTE — ED PROVIDER NOTE - OBJECTIVE STATEMENT
46yo M pmhx Crohn's ds presents to ED c/o llq abdominal pain, diarrhea x 4 days. pt states symptoms feel similar to previous crohn's flares. Used to take daily steroids and pentasa but has not been on medications in a while, no longer sees GI doctor, states he is setting up appointment with a new GI doctor. Noting non-radiating pain to llq with associated diarrhea. No melena or bloody stools. Contrary to triage note no RLQ pain. Denies fever, bloody stool, vomiting, recent travel, sick contacts, cp, sob, urinary sx. No hx abdominal surgery.

## 2021-06-11 NOTE — ED PROVIDER NOTE - PROGRESS NOTE DETAILS
Trejo: patient endorsing improved abdominal pain in LLQ, resting comfortably in bed. patient waiting for CT.

## 2021-06-11 NOTE — ED PROVIDER NOTE - CLINICAL SUMMARY MEDICAL DECISION MAKING FREE TEXT BOX
44yo M pmhx crohn's presenting with abd pain, diarrhea, concerned for crohn's flare. Pt deferring ct imaging at this time. 44yo M pmhx crohn's presenting with abd pain, diarrhea, concerned for crohn's flare. Pt deferring ct imaging initially. No wbc elevation, labs not warranting emergent intervention. After re-assessment pt still noting significant abd pain, now agreeable to ct abd/pelvis

## 2021-06-11 NOTE — ED PROVIDER NOTE - CARE PROVIDER_API CALL
Ankit Villabla)  Gastroenterology; Internal Medicine  [address] [place] [address]  Phone: [phone]  Fax: [phone]  Follow Up Time: 1-3 Days

## 2021-06-11 NOTE — ED PROVIDER NOTE - PATIENT PORTAL LINK FT
You can access the FollowMyHealth Patient Portal offered by [facility] by registering at the following website: [email or web address]. By joining [employer]’s FollowMyHealth portal, you will also be able to view your health information using other applications (apps) compatible with our system.

## 2021-06-11 NOTE — ED PROVIDER NOTE - PHYSICAL EXAMINATION
Gen: Uncomfortable  HEENT: Mucous membranes moist, pink conjunctivae, EOMI  CV: RRR, nl s1/s2.  Resp: CTAB, normal rate and effort  GI: Abdomen soft, diffusely ttp, worse LLQ. No rebound, no guarding  : No CVAT  Neuro: A&O x 3, moving all 4 extremities  MSK: No spine or joint tenderness to palpation  Skin: No rashes. intact and perfused.

## 2021-06-11 NOTE — ED ADULT TRIAGE NOTE - CHIEF COMPLAINT QUOTE
PT C/O of RLQ abd pain associated with nausea and diarrhea.  Denies vomiting or fevers.  PT states "I think I'm having a Crohns flair up."

## 2021-06-11 NOTE — ED PROVIDER NOTE - NS ED ROS FT
Gen: denies fever, chills, fatigue, weight loss  Skin: denies rashes, laceration, bruising  HEENT: denies visual changes, ear pain, nasal congestion, throat pain  Respiratory: denies GUDINO, SOB, cough, wheezing  Cardiovascular: denies chest pain, palpitations, diaphoresis, LE edema  GI: +ABDOMINAL PAIN +DIARRHEA. Denies nausea, vomiting, melena  : denies dysuria, frequency, urgency, bowel/bladder incontinence  MSK: denies joint swelling/pain, back pain, neck pain  Neuro: denies headache, dizziness, weakness, numbness  Psych: denies anxiety, depression, SI/HI, visual/auditory hallucinations

## 2021-06-11 NOTE — ED PROVIDER NOTE - ATTENDING CONTRIBUTION TO CARE
44yo male with pmh of crohn's presents with LLQ pain for 4 days. Pt states similar to previous flares but lasting longer. Pt no longer being followed by GI doctor. Pt denies fevers/chills, ha, loc, focal neuro deficits, cp/sob/palp, cough, v/d, urinary symptoms, recent travel and sick contacts.  Const: Awake, alert and oriented. In no acute distress. Well appearing.  HEENT: NC/AT. Moist mucous membranes.  Eyes: No scleral icterus. EOMI.  Neck:. Soft and supple. Full ROM without pain.  Cardiac: Regular rate and regular rhythm. +S1/S2. Peripheral pulses 2+ and symmetric. No LE edema.  Resp: Speaking in full sentences. No evidence of respiratory distress. No wheezes, rales or rhonchi.  Abd: Soft, ttp LLQ, non-distended. Normal bowel sounds in all 4 quadrants. No guarding or rebound.  Back: Spine midline and non-tender. No CVAT.  Skin: No rashes, abrasions or lacerations.  Lymph: No cervical lymphadenopathy.  Neuro: Awake, alert & oriented x 3. Moves all extremities symmetrically.  analgesics, labs, ctap

## 2022-09-30 ENCOUNTER — EMERGENCY (EMERGENCY)
Facility: HOSPITAL | Age: 47
LOS: 1 days | Discharge: LEFT BEFORE DISCHARGE | End: 2022-09-30
Attending: EMERGENCY MEDICINE
Payer: MEDICAID

## 2022-09-30 VITALS
DIASTOLIC BLOOD PRESSURE: 67 MMHG | TEMPERATURE: 99 F | RESPIRATION RATE: 18 BRPM | SYSTOLIC BLOOD PRESSURE: 122 MMHG | HEIGHT: 67 IN | HEART RATE: 100 BPM | WEIGHT: 166.89 LBS | OXYGEN SATURATION: 99 %

## 2022-09-30 DIAGNOSIS — Z86.79 PERSONAL HISTORY OF OTHER DISEASES OF THE CIRCULATORY SYSTEM: Chronic | ICD-10-CM

## 2022-09-30 DIAGNOSIS — S72.90XA UNSPECIFIED FRACTURE OF UNSPECIFIED FEMUR, INITIAL ENCOUNTER FOR CLOSED FRACTURE: Chronic | ICD-10-CM

## 2022-09-30 LAB
ALBUMIN SERPL ELPH-MCNC: 4.5 G/DL — SIGNIFICANT CHANGE UP (ref 3.3–5.2)
ALP SERPL-CCNC: 85 U/L — SIGNIFICANT CHANGE UP (ref 40–120)
ALT FLD-CCNC: 12 U/L — SIGNIFICANT CHANGE UP
ANION GAP SERPL CALC-SCNC: 12 MMOL/L — SIGNIFICANT CHANGE UP (ref 5–17)
AST SERPL-CCNC: 13 U/L — SIGNIFICANT CHANGE UP
BASOPHILS # BLD AUTO: 0.03 K/UL — SIGNIFICANT CHANGE UP (ref 0–0.2)
BASOPHILS NFR BLD AUTO: 0.3 % — SIGNIFICANT CHANGE UP (ref 0–2)
BILIRUB SERPL-MCNC: 0.2 MG/DL — LOW (ref 0.4–2)
BUN SERPL-MCNC: 14.5 MG/DL — SIGNIFICANT CHANGE UP (ref 8–20)
CALCIUM SERPL-MCNC: 9.1 MG/DL — SIGNIFICANT CHANGE UP (ref 8.4–10.5)
CHLORIDE SERPL-SCNC: 100 MMOL/L — SIGNIFICANT CHANGE UP (ref 98–107)
CO2 SERPL-SCNC: 28 MMOL/L — SIGNIFICANT CHANGE UP (ref 22–29)
CREAT SERPL-MCNC: 0.91 MG/DL — SIGNIFICANT CHANGE UP (ref 0.5–1.3)
EGFR: 105 ML/MIN/1.73M2 — SIGNIFICANT CHANGE UP
EOSINOPHIL # BLD AUTO: 0.2 K/UL — SIGNIFICANT CHANGE UP (ref 0–0.5)
EOSINOPHIL NFR BLD AUTO: 2.2 % — SIGNIFICANT CHANGE UP (ref 0–6)
GLUCOSE SERPL-MCNC: 109 MG/DL — HIGH (ref 70–99)
HCT VFR BLD CALC: 45.4 % — SIGNIFICANT CHANGE UP (ref 39–50)
HGB BLD-MCNC: 15.4 G/DL — SIGNIFICANT CHANGE UP (ref 13–17)
HIV 1 & 2 AB SERPL IA.RAPID: SIGNIFICANT CHANGE UP
IMM GRANULOCYTES NFR BLD AUTO: 0.2 % — SIGNIFICANT CHANGE UP (ref 0–0.9)
LYMPHOCYTES # BLD AUTO: 2.29 K/UL — SIGNIFICANT CHANGE UP (ref 1–3.3)
LYMPHOCYTES # BLD AUTO: 25.4 % — SIGNIFICANT CHANGE UP (ref 13–44)
MCHC RBC-ENTMCNC: 30.2 PG — SIGNIFICANT CHANGE UP (ref 27–34)
MCHC RBC-ENTMCNC: 33.9 GM/DL — SIGNIFICANT CHANGE UP (ref 32–36)
MCV RBC AUTO: 89 FL — SIGNIFICANT CHANGE UP (ref 80–100)
MONOCYTES # BLD AUTO: 0.57 K/UL — SIGNIFICANT CHANGE UP (ref 0–0.9)
MONOCYTES NFR BLD AUTO: 6.3 % — SIGNIFICANT CHANGE UP (ref 2–14)
NEUTROPHILS # BLD AUTO: 5.89 K/UL — SIGNIFICANT CHANGE UP (ref 1.8–7.4)
NEUTROPHILS NFR BLD AUTO: 65.6 % — SIGNIFICANT CHANGE UP (ref 43–77)
PLATELET # BLD AUTO: 298 K/UL — SIGNIFICANT CHANGE UP (ref 150–400)
POTASSIUM SERPL-MCNC: 3.3 MMOL/L — LOW (ref 3.5–5.3)
POTASSIUM SERPL-SCNC: 3.3 MMOL/L — LOW (ref 3.5–5.3)
PROT SERPL-MCNC: 7.1 G/DL — SIGNIFICANT CHANGE UP (ref 6.6–8.7)
RBC # BLD: 5.1 M/UL — SIGNIFICANT CHANGE UP (ref 4.2–5.8)
RBC # FLD: 13.2 % — SIGNIFICANT CHANGE UP (ref 10.3–14.5)
SODIUM SERPL-SCNC: 140 MMOL/L — SIGNIFICANT CHANGE UP (ref 135–145)
WBC # BLD: 9 K/UL — SIGNIFICANT CHANGE UP (ref 3.8–10.5)
WBC # FLD AUTO: 9 K/UL — SIGNIFICANT CHANGE UP (ref 3.8–10.5)

## 2022-09-30 PROCEDURE — 90471 IMMUNIZATION ADMIN: CPT

## 2022-09-30 PROCEDURE — 80053 COMPREHEN METABOLIC PANEL: CPT

## 2022-09-30 PROCEDURE — 86703 HIV-1/HIV-2 1 RESULT ANTBDY: CPT

## 2022-09-30 PROCEDURE — 36415 COLL VENOUS BLD VENIPUNCTURE: CPT

## 2022-09-30 PROCEDURE — 99284 EMERGENCY DEPT VISIT MOD MDM: CPT | Mod: 25

## 2022-09-30 PROCEDURE — 73630 X-RAY EXAM OF FOOT: CPT

## 2022-09-30 PROCEDURE — 96375 TX/PRO/DX INJ NEW DRUG ADDON: CPT

## 2022-09-30 PROCEDURE — 99284 EMERGENCY DEPT VISIT MOD MDM: CPT

## 2022-09-30 PROCEDURE — 85025 COMPLETE CBC W/AUTO DIFF WBC: CPT

## 2022-09-30 PROCEDURE — 73630 X-RAY EXAM OF FOOT: CPT | Mod: 26,RT

## 2022-09-30 PROCEDURE — 90715 TDAP VACCINE 7 YRS/> IM: CPT

## 2022-09-30 PROCEDURE — 96365 THER/PROPH/DIAG IV INF INIT: CPT

## 2022-09-30 RX ORDER — TETANUS TOXOID, REDUCED DIPHTHERIA TOXOID AND ACELLULAR PERTUSSIS VACCINE, ADSORBED 5; 2.5; 8; 8; 2.5 [IU]/.5ML; [IU]/.5ML; UG/.5ML; UG/.5ML; UG/.5ML
0.5 SUSPENSION INTRAMUSCULAR ONCE
Refills: 0 | Status: COMPLETED | OUTPATIENT
Start: 2022-09-30 | End: 2022-09-30

## 2022-09-30 RX ORDER — KETOROLAC TROMETHAMINE 30 MG/ML
30 SYRINGE (ML) INJECTION ONCE
Refills: 0 | Status: DISCONTINUED | OUTPATIENT
Start: 2022-09-30 | End: 2022-09-30

## 2022-09-30 RX ORDER — ACETAMINOPHEN 500 MG
650 TABLET ORAL ONCE
Refills: 0 | Status: COMPLETED | OUTPATIENT
Start: 2022-09-30 | End: 2022-09-30

## 2022-09-30 RX ADMIN — Medication 30 MILLIGRAM(S): at 16:42

## 2022-09-30 RX ADMIN — TETANUS TOXOID, REDUCED DIPHTHERIA TOXOID AND ACELLULAR PERTUSSIS VACCINE, ADSORBED 0.5 MILLILITER(S): 5; 2.5; 8; 8; 2.5 SUSPENSION INTRAMUSCULAR at 15:45

## 2022-09-30 RX ADMIN — Medication 30 MILLIGRAM(S): at 17:00

## 2022-09-30 RX ADMIN — Medication 900 MILLIGRAM(S): at 16:12

## 2022-09-30 RX ADMIN — Medication 100 MILLIGRAM(S): at 15:46

## 2022-09-30 NOTE — ED PROVIDER NOTE - CARE PROVIDER_API CALL
Ray Ferguson (DPMADISON)  Podiatric Medicine and Surgery  [address], [address]  [place] [address]  Phone: [phone]  Fax: [phone]  Follow Up Time:

## 2022-09-30 NOTE — ED PROVIDER NOTE - NSICDXFAMILYHX_GEN_ALL_CORE_FT
FAMILY HISTORY:  No pertinent family history in first degree relatives, No FHx of seizure or CVA in mother or father

## 2022-09-30 NOTE — ED PROVIDER NOTE - PROGRESS NOTE DETAILS
PT evaluated by intake physician. HPI/PE/ROS as noted above. Will follow up plan per intake physician. pt advised that he should stay for IV abx and podiatry consult. Pt adamantly wants to leave. I had a lengthy discussion with patient, and the patient wishes to leave at this time. The patient understands that he is leaving against medical advice despite the risk of missing a potential serious diagnosis which may lead to injury, disability and/or death. I discussed with the patient which tests would need to be performed and what type of monitoring would be necessary for the patient as well. I was unable to convince the patient to stay for further work-up. The patient is alert and oriented and demonstrates competence in making medical decisions.

## 2022-09-30 NOTE — ED PROVIDER NOTE - ATTENDING CONTRIBUTION TO CARE
118 I performed the initial face to face bedside interview with this patient regarding history of present illness, review of symptoms and relevant past medical, social and family history.  I completed an independent physical examination.  I was the initial provider who evaluated this patient. I have signed out the follow up of any pending tests (i.e. labs, radiological studies) to the ACP and NP student.  I have communicated the patient’s plan of care and disposition with the ACP and NP student.

## 2022-09-30 NOTE — ED PROVIDER NOTE - OBJECTIVE STATEMENT
45 y/o M with PMH of anxiety/depression, c/o right foot pain and swelling. Pt states he scratched his toe when he was sleeping 4 days ago, sustained a small cut and yesterday morning the foot began to swell and has intermittent pain. Pain worse with movement rates it 8/10. He has been using crutches today because he is unable to bear weight on right foot. Pt denies bleeding, drainage, no fever/chills. Pt did not attempt wound care or try OTC pain relievers.

## 2022-09-30 NOTE — ED ADULT NURSE NOTE - OBJECTIVE STATEMENT
Assumed care at 1550 pt co right foot pain, pt states he cut his toe 5-6 days ago and yesterday he noticed his foot was swollen, pt denies any fevers, chills. no open cut to toe.

## 2022-09-30 NOTE — ED ADULT NURSE NOTE - NSIMPLEMENTINTERV_GEN_ALL_ED
Implemented All Fall Risk Interventions:  [place] to call system. Call bell, personal items and telephone within reach. Instruct patient to call for assistance. Room bathroom lighting operational. Non-slip footwear when patient is off stretcher. Physically safe environment: no spills, clutter or unnecessary equipment. Stretcher in lowest position, wheels locked, appropriate side rails in place. Provide visual cue, wrist band, yellow gown, etc. Monitor gait and stability. Monitor for mental status changes and reorient to person, place, and time. Review medications for side effects contributing to fall risk. Reinforce activity limits and safety measures with patient and family.

## 2022-09-30 NOTE — ED PROVIDER NOTE - NS ED ATTENDING STATEMENT MOD
I have seen and examined this patient and fully participated in the care of this patient as the teaching attending.  The service was shared with the DIMITRY.  I reviewed and verified the documentation and independently performed the documented:
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
## 2022-09-30 NOTE — ED PROVIDER NOTE - CLINICAL SUMMARY MEDICAL DECISION MAKING FREE TEXT BOX
47 y/o M with no pertinent PMH p/w right 3rd toe wound with generalized right foot pain and swelling concerning for cellulitis  Plan:  -IV clindamycin  -check CBC/CMP  -tetanus out of date- booster   - XRAY of right foot to r/o osteo

## 2022-09-30 NOTE — ED PROVIDER NOTE - PATIENT PORTAL LINK FT
You can access the FollowMyHealth Patient Portal offered by [facility] by registering at the following website: [email or web address]. By joining [employer]’s FollowMyHealth portal, you will also be able to view your health information using other applications (apps) compatible with our system.

## 2022-09-30 NOTE — ED ADULT NURSE NOTE - NS ED NURSE LEVEL OF CONSCIOUSNESS AFFECT
After Visit Summary   7/17/2017    Petra Diallo    MRN: [id number]           Patient Information     Date Of Birth          1978        Visit Information        Provider Department      7/17/2017 10:40 AM Americo Etienne MD [facility] Neurosurgery [facility]        Today's Diagnoses     Thoracic disc herniation    -  1      Care Instructions    Referral to Dr. Mart Gaston. You may call to schedule: [phone]           Follow-ups after your visit        Additional Services     PHYSIATRY REFERRAL       Your provider has referred you to: Mart Hung [phone]   [email or web address]    Please be aware that coverage of these services is subject to the terms and limitations of your health insurance plan.  Call member services at your health plan with any benefit or coverage questions.      Please bring the following to your appointment:  >>   Any x-rays, CTs or MRIs which have been performed.  Contact the facility where they were done to arrange for  prior to your scheduled appointment.    >>   List of current medications   >>   This referral request   >>   Any documents/labs given to you for this referral                  Who to contact     If you have questions or need follow up information about today's clinic visit or your schedule please contact [facility] NEUROSURGERY [facility] directly at [phone].  Normal or non-critical lab and imaging results will be communicated to you by MyChart, letter or phone within 4 business days after the clinic has received the results. If you do not hear from us within 7 days, please contact the clinic through MyChart or phone. If you have a critical or abnormal lab result, we will notify you by phone as soon as possible.  Submit refill requests through [employer] or call your pharmacy and they will forward the refill request to us. Please allow 3 business days for your refill to be completed.           "Additional Information About Your Visit        MyChart Information     [email or web address] lets you send messages to your doctor, view your test results, renew your prescriptions, schedule appointments and more. To sign up, go to [email or web address] . Click on \"Log in\" on the left side of the screen, which will take you to the Welcome page. Then click on \"Sign up Now\" on the right side of the page.     You will be asked to enter the access code listed below, as well as some personal information. Please follow the directions to create your username and password.     Your access code is: [email or web address]  Expires: 10/15/2017 11:17 AM     Your access code will  in 90 days. If you need help or a new code, please call your [place] clinic or [phone].        Care EveryWhere ID     This is your [facility] EveryWhere ID. This could be used by other organizations to access your [place] medical records  [id number]        Your Vitals Were     Pulse Temperature Height Pulse Oximetry BMI (Body Mass Index)       74 98.4  F (36.9  C) 5' 4.5\" (1.638 m) 98% 22.14 kg/m2        Blood Pressure from Last 3 Encounters:   17 107/71   17 110/66   17 102/60    Weight from Last 3 Encounters:   17 131 lb (59.4 kg)   17 130 lb 6.4 oz (59.1 kg)   17 130 lb 9.6 oz (59.2 kg)              We Performed the Following     PHYSIATRY REFERRAL        Primary Care Provider Office Phone # Fax #    Jeniffer Etienne [phone]       [facility] [address] [facility] [address]        Equal Access to Services     PUSHPA MALIK : Areli Burciaga, ivanna rodriguez, beka hemphill, lior barajas. So [facility] [phone].    ATENCIÓN: Si habla español, tiene a diego disposición servicios gratuitos de asistencia lingüística. Llame al [phone].    We comply with applicable federal civil rights laws and [place] laws. We do not discriminate on " the basis of race, color, national origin, age, disability sex, sexual orientation or gender identity.            Thank you!     Thank you for choosing [facility] NEUROSURGERY [facility]  for your care. Our goal is always to provide you with excellent care. Hearing back from our patients is one way we can continue to improve our services. Please take a few minutes to complete the written survey that you may receive in the mail after your visit with us. Thank you!             Your Updated Medication List - Protect others around you: Learn how to safely use, store and throw away your medicines at www.disposemymeds.org.          This list is accurate as of: 7/17/17 11:17 AM.  Always use your most recent med list.                   Brand Name Dispense Instructions for use Diagnosis    buPROPion 150 MG 24 hr tablet    WELLBUTRIN XL     Take one tablet by mouth every morning.        cyclobenzaprine 5 MG tablet    FLEXERIL    90 tablet    Take 1 tablet (5 mg) by mouth 3 times daily as needed for muscle spasms    Thoracic disc herniation       GABAPENTIN PO      Take 200 mg by mouth 2 times daily        lidocaine 5 % ointment    XYLOCAINE          methocarbamol 500 MG tablet    ROBAXIN    90 tablet    Take 1-2 tablets (500-1,000 mg) by mouth 3 times daily as needed for muscle spasms    Thoracic disc herniation       PAXIL PO      Take 20 mg by mouth daily           Calm

## 2022-09-30 NOTE — ED PROVIDER NOTE - NSFOLLOWUPINSTRUCTIONS_ED_ALL_ED_FT
Follow up with podiatry.  Take antibiotic as prescribed.  Come back with new or worsening symptoms.  Cellulitis    Cellulitis is a skin infection caused by bacteria. This condition occurs most often in the arms and lower legs but can occur anywhere over the body. Symptoms include redness, swelling, warm skin, tenderness, and chills/fever. If you were prescribed an antibiotic medicine, take it as told by your health care provider. Do not stop taking the antibiotic even if you start to feel better.    SEEK IMMEDIATE MEDICAL CARE IF YOU HAVE ANY OF THE FOLLOWING SYMPTOMS: worsening fever, red streaks coming from affected area, vomiting or diarrhea, or dizziness/lightheadedness.

## 2022-10-02 ENCOUNTER — EMERGENCY (EMERGENCY)
Facility: HOSPITAL | Age: 47
LOS: 1 days | Discharge: HOME | End: 2022-10-02
Attending: EMERGENCY MEDICINE
Payer: MEDICAID

## 2022-10-02 VITALS
OXYGEN SATURATION: 98 % | DIASTOLIC BLOOD PRESSURE: 84 MMHG | HEART RATE: 98 BPM | TEMPERATURE: 98 F | RESPIRATION RATE: 16 BRPM | SYSTOLIC BLOOD PRESSURE: 125 MMHG | HEIGHT: 67 IN

## 2022-10-02 DIAGNOSIS — S72.90XA UNSPECIFIED FRACTURE OF UNSPECIFIED FEMUR, INITIAL ENCOUNTER FOR CLOSED FRACTURE: Chronic | ICD-10-CM

## 2022-10-02 DIAGNOSIS — Z86.79 PERSONAL HISTORY OF OTHER DISEASES OF THE CIRCULATORY SYSTEM: Chronic | ICD-10-CM

## 2022-10-02 LAB
A1C WITH ESTIMATED AVERAGE GLUCOSE RESULT: 5.5 % — SIGNIFICANT CHANGE UP (ref 4–5.6)
ALBUMIN SERPL ELPH-MCNC: 4.1 G/DL — SIGNIFICANT CHANGE UP (ref 3.3–5.2)
ALP SERPL-CCNC: 76 U/L — SIGNIFICANT CHANGE UP (ref 40–120)
ALT FLD-CCNC: 12 U/L — SIGNIFICANT CHANGE UP
ANION GAP SERPL CALC-SCNC: 10 MMOL/L — SIGNIFICANT CHANGE UP (ref 5–17)
APPEARANCE UR: CLEAR — SIGNIFICANT CHANGE UP
APTT BLD: 37.1 SEC — HIGH (ref 27.5–35.5)
AST SERPL-CCNC: 17 U/L — SIGNIFICANT CHANGE UP
BASOPHILS # BLD AUTO: 0.03 K/UL — SIGNIFICANT CHANGE UP (ref 0–0.2)
BASOPHILS NFR BLD AUTO: 0.5 % — SIGNIFICANT CHANGE UP (ref 0–2)
BILIRUB SERPL-MCNC: 0.2 MG/DL — LOW (ref 0.4–2)
BILIRUB UR-MCNC: NEGATIVE — SIGNIFICANT CHANGE UP
BUN SERPL-MCNC: 10.2 MG/DL — SIGNIFICANT CHANGE UP (ref 8–20)
CALCIUM SERPL-MCNC: 9.3 MG/DL — SIGNIFICANT CHANGE UP (ref 8.4–10.5)
CHLORIDE SERPL-SCNC: 103 MMOL/L — SIGNIFICANT CHANGE UP (ref 98–107)
CO2 SERPL-SCNC: 26 MMOL/L — SIGNIFICANT CHANGE UP (ref 22–29)
COLOR SPEC: YELLOW — SIGNIFICANT CHANGE UP
CREAT SERPL-MCNC: 0.74 MG/DL — SIGNIFICANT CHANGE UP (ref 0.5–1.3)
CRP SERPL-MCNC: 48 MG/L — HIGH
DIFF PNL FLD: NEGATIVE — SIGNIFICANT CHANGE UP
EGFR: 112 ML/MIN/1.73M2 — SIGNIFICANT CHANGE UP
EOSINOPHIL # BLD AUTO: 0.15 K/UL — SIGNIFICANT CHANGE UP (ref 0–0.5)
EOSINOPHIL NFR BLD AUTO: 2.3 % — SIGNIFICANT CHANGE UP (ref 0–6)
ERYTHROCYTE [SEDIMENTATION RATE] IN BLOOD: 21 MM/HR — HIGH (ref 0–20)
ESTIMATED AVERAGE GLUCOSE: 111 MG/DL — SIGNIFICANT CHANGE UP (ref 68–114)
GLUCOSE SERPL-MCNC: 120 MG/DL — HIGH (ref 70–99)
GLUCOSE UR QL: NEGATIVE MG/DL — SIGNIFICANT CHANGE UP
HCT VFR BLD CALC: 44.5 % — SIGNIFICANT CHANGE UP (ref 39–50)
HGB BLD-MCNC: 15.1 G/DL — SIGNIFICANT CHANGE UP (ref 13–17)
IMM GRANULOCYTES NFR BLD AUTO: 0.2 % — SIGNIFICANT CHANGE UP (ref 0–0.9)
INR BLD: 1.25 RATIO — HIGH (ref 0.88–1.16)
KETONES UR-MCNC: NEGATIVE — SIGNIFICANT CHANGE UP
LACTATE BLDV-MCNC: 0.9 MMOL/L — SIGNIFICANT CHANGE UP (ref 0.5–2)
LEUKOCYTE ESTERASE UR-ACNC: NEGATIVE — SIGNIFICANT CHANGE UP
LYMPHOCYTES # BLD AUTO: 1.05 K/UL — SIGNIFICANT CHANGE UP (ref 1–3.3)
LYMPHOCYTES # BLD AUTO: 15.9 % — SIGNIFICANT CHANGE UP (ref 13–44)
MCHC RBC-ENTMCNC: 30.4 PG — SIGNIFICANT CHANGE UP (ref 27–34)
MCHC RBC-ENTMCNC: 33.9 GM/DL — SIGNIFICANT CHANGE UP (ref 32–36)
MCV RBC AUTO: 89.5 FL — SIGNIFICANT CHANGE UP (ref 80–100)
MONOCYTES # BLD AUTO: 0.47 K/UL — SIGNIFICANT CHANGE UP (ref 0–0.9)
MONOCYTES NFR BLD AUTO: 7.1 % — SIGNIFICANT CHANGE UP (ref 2–14)
NEUTROPHILS # BLD AUTO: 4.9 K/UL — SIGNIFICANT CHANGE UP (ref 1.8–7.4)
NEUTROPHILS NFR BLD AUTO: 74 % — SIGNIFICANT CHANGE UP (ref 43–77)
NITRITE UR-MCNC: NEGATIVE — SIGNIFICANT CHANGE UP
PH UR: 6 — SIGNIFICANT CHANGE UP (ref 5–8)
PLATELET # BLD AUTO: 286 K/UL — SIGNIFICANT CHANGE UP (ref 150–400)
POTASSIUM SERPL-MCNC: 4.3 MMOL/L — SIGNIFICANT CHANGE UP (ref 3.5–5.3)
POTASSIUM SERPL-SCNC: 4.3 MMOL/L — SIGNIFICANT CHANGE UP (ref 3.5–5.3)
PROT SERPL-MCNC: 7.3 G/DL — SIGNIFICANT CHANGE UP (ref 6.6–8.7)
PROT UR-MCNC: NEGATIVE — SIGNIFICANT CHANGE UP
PROTHROM AB SERPL-ACNC: 14.5 SEC — HIGH (ref 10.5–13.4)
RBC # BLD: 4.97 M/UL — SIGNIFICANT CHANGE UP (ref 4.2–5.8)
RBC # FLD: 13.1 % — SIGNIFICANT CHANGE UP (ref 10.3–14.5)
SODIUM SERPL-SCNC: 139 MMOL/L — SIGNIFICANT CHANGE UP (ref 135–145)
SP GR SPEC: 1.02 — SIGNIFICANT CHANGE UP (ref 1.01–1.02)
UROBILINOGEN FLD QL: NEGATIVE MG/DL — SIGNIFICANT CHANGE UP
WBC # BLD: 6.61 K/UL — SIGNIFICANT CHANGE UP (ref 3.8–10.5)
WBC # FLD AUTO: 6.61 K/UL — SIGNIFICANT CHANGE UP (ref 3.8–10.5)

## 2022-10-02 PROCEDURE — 99285 EMERGENCY DEPT VISIT HI MDM: CPT

## 2022-10-02 PROCEDURE — 73630 X-RAY EXAM OF FOOT: CPT | Mod: 26,RT

## 2022-10-02 RX ORDER — AMPICILLIN SODIUM AND SULBACTAM SODIUM 250; 125 MG/ML; MG/ML
3 INJECTION, POWDER, FOR SUSPENSION INTRAMUSCULAR; INTRAVENOUS ONCE
Refills: 0 | Status: COMPLETED | OUTPATIENT
Start: 2022-10-02 | End: 2022-10-02

## 2022-10-02 RX ORDER — MORPHINE SULFATE 50 MG/1
4 CAPSULE, EXTENDED RELEASE ORAL ONCE
Refills: 0 | Status: DISCONTINUED | OUTPATIENT
Start: 2022-10-02 | End: 2022-10-02

## 2022-10-02 RX ORDER — AMPICILLIN SODIUM AND SULBACTAM SODIUM 250; 125 MG/ML; MG/ML
INJECTION, POWDER, FOR SUSPENSION INTRAMUSCULAR; INTRAVENOUS
Refills: 0 | Status: DISCONTINUED | OUTPATIENT
Start: 2022-10-02 | End: 2022-10-07

## 2022-10-02 RX ORDER — KETOROLAC TROMETHAMINE 30 MG/ML
15 SYRINGE (ML) INJECTION ONCE
Refills: 0 | Status: DISCONTINUED | OUTPATIENT
Start: 2022-10-02 | End: 2022-10-02

## 2022-10-02 RX ORDER — AMPICILLIN SODIUM AND SULBACTAM SODIUM 250; 125 MG/ML; MG/ML
3 INJECTION, POWDER, FOR SUSPENSION INTRAMUSCULAR; INTRAVENOUS EVERY 6 HOURS
Refills: 0 | Status: DISCONTINUED | OUTPATIENT
Start: 2022-10-03 | End: 2022-10-07

## 2022-10-02 RX ADMIN — AMPICILLIN SODIUM AND SULBACTAM SODIUM 200 GRAM(S): 250; 125 INJECTION, POWDER, FOR SUSPENSION INTRAMUSCULAR; INTRAVENOUS at 22:06

## 2022-10-02 RX ADMIN — Medication 15 MILLIGRAM(S): at 23:17

## 2022-10-02 RX ADMIN — MORPHINE SULFATE 4 MILLIGRAM(S): 50 CAPSULE, EXTENDED RELEASE ORAL at 22:06

## 2022-10-02 RX ADMIN — MORPHINE SULFATE 4 MILLIGRAM(S): 50 CAPSULE, EXTENDED RELEASE ORAL at 23:17

## 2022-10-02 RX ADMIN — Medication 15 MILLIGRAM(S): at 22:06

## 2022-10-02 RX ADMIN — AMPICILLIN SODIUM AND SULBACTAM SODIUM 3 GRAM(S): 250; 125 INJECTION, POWDER, FOR SUSPENSION INTRAMUSCULAR; INTRAVENOUS at 23:17

## 2022-10-02 NOTE — ED ADULT NURSE NOTE - CAS TRG GEN SKIN COLOR
Normal for race Normal vision: sees adequately in most situations; can see medication labels, newsprint

## 2022-10-02 NOTE — ED ADULT TRIAGE NOTE - CHIEF COMPLAINT QUOTE
Pt. complaining of right foot infection. pt. states he's he was seen here 2 days ago, and was told to come back if his foot because red. Pt. denies fever or chills. Not able to visualize site in triage.

## 2022-10-02 NOTE — ED STATDOCS - OBJECTIVE STATEMENT
Seen on September 30 for right foot pain and swelling.  Prescribed clindamycin.  Reports worsening pain and swelling, with difficulty ambulating.  Developed redness over foot today.  Reports cutting foot approximately 9 days ago.  Noted increased pain to dorsum of foot a couple of days after the cut followed by swelling.  Denies fever.  Denies prior vascular or extremity problems.  Denies history of diabetes.  Smoker.

## 2022-10-02 NOTE — ED STATDOCS - ATTENDING APP SHARED VISIT CONTRIBUTION OF CARE
I, Charanjit Sood, performed the initial face to face bedside interview with this patient regarding history of present illness, review of symptoms and relevant past medical, social and family history.  I completed an independent physical examination.  I was the provider who initially evaluated this patient.  Follow-up on ordered tests (ie labs, radiologic studies) and re-evaluation of the patient's status has been communicated to the ACP.  Disposition of the patient will be based on test outcome and response to ED interventions.

## 2022-10-02 NOTE — ED ADULT NURSE NOTE - OBJECTIVE STATEMENT
Assumed care of pt at 2148 in . Pt A&Ox4 c/o right foot infection. As per pt, Pt was here a couple days ago for same thing, pt was advised to stay for iv abx but as per pt, pt AMA'd. Pt returns to ED with right foot swelling and pain. Pt denies any other medical complaints. Pt denies numbness/ tingleness in right leg and foot. Pt able to move all extremities. Pt is afebrile. Pt denies n/v/d.

## 2022-10-02 NOTE — ED STATDOCS - PROGRESS NOTE DETAILS
FLAVIA Taylor: Patient evaluated by intake physician. HPI/ROS/PE as noted above. Will follow up plan per intake physician and continue to assess patient.

## 2022-10-02 NOTE — ED STATDOCS - NS ED ATTENDING STATEMENT MOD
This was a shared visit with the DIMITRY. I reviewed and verified the documentation and independently performed the documented:

## 2022-10-02 NOTE — ED STATDOCS - CLINICAL SUMMARY MEDICAL DECISION MAKING FREE TEXT BOX
Worsening foot infection/cellulitis.  Will need admission or observation for IV antibiotics and CT to evaluate for abscess/osteomyelitis.

## 2022-10-02 NOTE — ED STATDOCS - PHYSICAL EXAMINATION
nontoxic-appearing, no acute distress, right foot swollen/tender/erythematous.  Left foot normal.  No calf tenderness bilateral.  Heart regular.  Lungs CTA.

## 2022-10-03 VITALS
OXYGEN SATURATION: 96 % | HEART RATE: 83 BPM | SYSTOLIC BLOOD PRESSURE: 102 MMHG | DIASTOLIC BLOOD PRESSURE: 68 MMHG | RESPIRATION RATE: 18 BRPM | TEMPERATURE: 98 F

## 2022-10-03 PROBLEM — F41.9 ANXIETY DISORDER, UNSPECIFIED: Chronic | Status: ACTIVE | Noted: 2022-09-30

## 2022-10-03 LAB — SARS-COV-2 RNA SPEC QL NAA+PROBE: SIGNIFICANT CHANGE UP

## 2022-10-03 PROCEDURE — 81003 URINALYSIS AUTO W/O SCOPE: CPT

## 2022-10-03 PROCEDURE — 85730 THROMBOPLASTIN TIME PARTIAL: CPT

## 2022-10-03 PROCEDURE — 85610 PROTHROMBIN TIME: CPT

## 2022-10-03 PROCEDURE — 96375 TX/PRO/DX INJ NEW DRUG ADDON: CPT | Mod: XU

## 2022-10-03 PROCEDURE — 96365 THER/PROPH/DIAG IV INF INIT: CPT | Mod: XU

## 2022-10-03 PROCEDURE — 86140 C-REACTIVE PROTEIN: CPT

## 2022-10-03 PROCEDURE — G1004: CPT

## 2022-10-03 PROCEDURE — 85025 COMPLETE CBC W/AUTO DIFF WBC: CPT

## 2022-10-03 PROCEDURE — U0003: CPT

## 2022-10-03 PROCEDURE — U0005: CPT

## 2022-10-03 PROCEDURE — 73630 X-RAY EXAM OF FOOT: CPT

## 2022-10-03 PROCEDURE — 85652 RBC SED RATE AUTOMATED: CPT

## 2022-10-03 PROCEDURE — 87040 BLOOD CULTURE FOR BACTERIA: CPT

## 2022-10-03 PROCEDURE — 83605 ASSAY OF LACTIC ACID: CPT

## 2022-10-03 PROCEDURE — 80053 COMPREHEN METABOLIC PANEL: CPT

## 2022-10-03 PROCEDURE — G0378: CPT

## 2022-10-03 PROCEDURE — 96366 THER/PROPH/DIAG IV INF ADDON: CPT | Mod: XU

## 2022-10-03 PROCEDURE — 96376 TX/PRO/DX INJ SAME DRUG ADON: CPT | Mod: XU

## 2022-10-03 PROCEDURE — 99284 EMERGENCY DEPT VISIT MOD MDM: CPT | Mod: 25

## 2022-10-03 PROCEDURE — 73701 CT LOWER EXTREMITY W/DYE: CPT | Mod: MG

## 2022-10-03 PROCEDURE — 36415 COLL VENOUS BLD VENIPUNCTURE: CPT

## 2022-10-03 PROCEDURE — 99220: CPT

## 2022-10-03 PROCEDURE — 83036 HEMOGLOBIN GLYCOSYLATED A1C: CPT

## 2022-10-03 PROCEDURE — 73701 CT LOWER EXTREMITY W/DYE: CPT | Mod: 26,RT,MG

## 2022-10-03 RX ADMIN — MORPHINE SULFATE 4 MILLIGRAM(S): 50 CAPSULE, EXTENDED RELEASE ORAL at 06:18

## 2022-10-03 RX ADMIN — MORPHINE SULFATE 4 MILLIGRAM(S): 50 CAPSULE, EXTENDED RELEASE ORAL at 00:00

## 2022-10-03 RX ADMIN — AMPICILLIN SODIUM AND SULBACTAM SODIUM 200 GRAM(S): 250; 125 INJECTION, POWDER, FOR SUSPENSION INTRAMUSCULAR; INTRAVENOUS at 11:25

## 2022-10-03 RX ADMIN — AMPICILLIN SODIUM AND SULBACTAM SODIUM 3 GRAM(S): 250; 125 INJECTION, POWDER, FOR SUSPENSION INTRAMUSCULAR; INTRAVENOUS at 07:48

## 2022-10-03 RX ADMIN — AMPICILLIN SODIUM AND SULBACTAM SODIUM 200 GRAM(S): 250; 125 INJECTION, POWDER, FOR SUSPENSION INTRAMUSCULAR; INTRAVENOUS at 06:18

## 2022-10-03 NOTE — ED CDU PROVIDER INITIAL DAY NOTE - MEDICAL DECISION MAKING DETAILS
45 yo male PMHx GERD, Crohn's presents to ED c/o worsening right foot cellulitis. Prescribed Clindamycin 9/30. No leukocytosis, CT negative for collection. Patient placed in CDU for IV abx.

## 2022-10-03 NOTE — ED CDU PROVIDER INITIAL DAY NOTE - OBJECTIVE STATEMENT
45 yo male PMHx GERD, Crohn's presents to ED c/o right foot pain. Patient evaluated on 9/30 for right foot pain and swelling, prescribed Clindamycin.  Reports worsening pain and swelling, with difficulty ambulating. Developed redness over foot today. Reports cutting foot approximately 9 days ago.  Noted increased pain to dorsum of foot a couple of days after the cut followed by swelling.  Denies fever.  Denies prior vascular or extremity problems.  Denies history of diabetes.  Smoker.

## 2022-10-03 NOTE — ED CDU PROVIDER DISPOSITION NOTE - PATIENT PORTAL LINK FT
You can access the FollowMyHealth Patient Portal offered by [facility] by registering at the following website: [email or web address]. By joining [employer]’s FollowMyHealth portal, you will also be able to view your health information using other applications (apps) compatible with our system.

## 2022-10-03 NOTE — ED CDU PROVIDER DISPOSITION NOTE - ATTENDING CONTRIBUTION TO CARE
Patient placed in CDU for cellulitis s/p failed antibiotic treatment with PO clindamycin. Patient s/p 1 day of IV unasyn. Patient reassessed and is feeling much better- redness/pain improved. Stable for DC home with PO Augmentin, podiatry f/u. Shilpa Murphy DO
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
## 2022-10-03 NOTE — ED CDU PROVIDER DISPOSITION NOTE - NSFOLLOWUPINSTRUCTIONS_ED_ALL_ED_FT
Please follow up with podiatry    Take antibiotics as prescribed    Return if symptoms worsen or persist     Cellulitis    Cellulitis is a skin infection caused by bacteria. This condition occurs most often in the arms and lower legs but can occur anywhere over the body. Symptoms include redness, swelling, warm skin, tenderness, and chills/fever. If you were prescribed an antibiotic medicine, take it as told by your health care provider. Do not stop taking the antibiotic even if you start to feel better.    SEEK IMMEDIATE MEDICAL CARE IF YOU HAVE ANY OF THE FOLLOWING SYMPTOMS: worsening fever, red streaks coming from affected area, vomiting or diarrhea, or dizziness/lightheadedness.

## 2022-10-03 NOTE — ED ADULT NURSE REASSESSMENT NOTE - NS ED NURSE REASSESS COMMENT FT1
Assumed care of pt from previous RN. Pt presents to ED c/o right foot pain and swelling. A/O x4. Respirations are even and unlabored on room air. IV intact with IV antibiotics infusing. Pt offers no complaints of pain or discomfort at this time. Pt educated on plan of care and expresses understanding.
Assumed care of the patient @2430. Pt A&Ox4. VSS afebrile. Pt denies c/o pain or discomfort at this time. Pt stating right foot is improved. Patient in understanding of plan of care. Patient with no further questions for the RN. Resting in comfort. Call bell within reach and encouraged to use when assistance needed. Will continue to monitor.

## 2022-10-03 NOTE — ED CDU PROVIDER DISPOSITION NOTE - CLINICAL COURSE
Patient with R foot cellulitis non-diabetic, treated with IV Unasyn with improvement.  Will dicharge home on Augmentin, outpt f/u with podiatry. Patient with R foot cellulitis non-diabetic, treated with IV Unasyn with improvement.  Demarcated line residing redness.  Advised to elevate, rest.  Will dicharge home on Augmentin, outpt f/u with podiatry.

## 2022-10-07 ENCOUNTER — INPATIENT (INPATIENT)
Facility: HOSPITAL | Age: 47
LOS: 1 days | Discharge: HOME | DRG: 603 | End: 2022-10-09
Attending: INTERNAL MEDICINE | Admitting: HOSPITALIST
Payer: MEDICAID

## 2022-10-07 VITALS
DIASTOLIC BLOOD PRESSURE: 81 MMHG | RESPIRATION RATE: 20 BRPM | SYSTOLIC BLOOD PRESSURE: 127 MMHG | WEIGHT: 160.06 LBS | TEMPERATURE: 98 F | HEIGHT: 67 IN | HEART RATE: 96 BPM | OXYGEN SATURATION: 97 %

## 2022-10-07 DIAGNOSIS — L03.115 CELLULITIS OF RIGHT LOWER LIMB: ICD-10-CM

## 2022-10-07 DIAGNOSIS — Z86.79 PERSONAL HISTORY OF OTHER DISEASES OF THE CIRCULATORY SYSTEM: Chronic | ICD-10-CM

## 2022-10-07 DIAGNOSIS — S72.90XA UNSPECIFIED FRACTURE OF UNSPECIFIED FEMUR, INITIAL ENCOUNTER FOR CLOSED FRACTURE: Chronic | ICD-10-CM

## 2022-10-07 LAB
ALBUMIN SERPL ELPH-MCNC: 4.3 G/DL — SIGNIFICANT CHANGE UP (ref 3.3–5.2)
ALP SERPL-CCNC: 78 U/L — SIGNIFICANT CHANGE UP (ref 40–120)
ALT FLD-CCNC: 13 U/L — SIGNIFICANT CHANGE UP
ANION GAP SERPL CALC-SCNC: 10 MMOL/L — SIGNIFICANT CHANGE UP (ref 5–17)
AST SERPL-CCNC: 21 U/L — SIGNIFICANT CHANGE UP
BASOPHILS # BLD AUTO: 0.04 K/UL — SIGNIFICANT CHANGE UP (ref 0–0.2)
BASOPHILS NFR BLD AUTO: 0.5 % — SIGNIFICANT CHANGE UP (ref 0–2)
BILIRUB SERPL-MCNC: <0.2 MG/DL — LOW (ref 0.4–2)
BUN SERPL-MCNC: 14.9 MG/DL — SIGNIFICANT CHANGE UP (ref 8–20)
CALCIUM SERPL-MCNC: 9.2 MG/DL — SIGNIFICANT CHANGE UP (ref 8.4–10.5)
CHLORIDE SERPL-SCNC: 103 MMOL/L — SIGNIFICANT CHANGE UP (ref 98–107)
CO2 SERPL-SCNC: 27 MMOL/L — SIGNIFICANT CHANGE UP (ref 22–29)
CREAT SERPL-MCNC: 0.7 MG/DL — SIGNIFICANT CHANGE UP (ref 0.5–1.3)
CRP SERPL-MCNC: 12 MG/L — HIGH
EGFR: 114 ML/MIN/1.73M2 — SIGNIFICANT CHANGE UP
EOSINOPHIL # BLD AUTO: 0.26 K/UL — SIGNIFICANT CHANGE UP (ref 0–0.5)
EOSINOPHIL NFR BLD AUTO: 3.5 % — SIGNIFICANT CHANGE UP (ref 0–6)
ERYTHROCYTE [SEDIMENTATION RATE] IN BLOOD: 25 MM/HR — HIGH (ref 0–20)
GLUCOSE SERPL-MCNC: 132 MG/DL — HIGH (ref 70–99)
HCT VFR BLD CALC: 41.7 % — SIGNIFICANT CHANGE UP (ref 39–50)
HGB BLD-MCNC: 14.3 G/DL — SIGNIFICANT CHANGE UP (ref 13–17)
IMM GRANULOCYTES NFR BLD AUTO: 0.3 % — SIGNIFICANT CHANGE UP (ref 0–0.9)
LYMPHOCYTES # BLD AUTO: 2.11 K/UL — SIGNIFICANT CHANGE UP (ref 1–3.3)
LYMPHOCYTES # BLD AUTO: 28.3 % — SIGNIFICANT CHANGE UP (ref 13–44)
MCHC RBC-ENTMCNC: 30.2 PG — SIGNIFICANT CHANGE UP (ref 27–34)
MCHC RBC-ENTMCNC: 34.3 GM/DL — SIGNIFICANT CHANGE UP (ref 32–36)
MCV RBC AUTO: 88.2 FL — SIGNIFICANT CHANGE UP (ref 80–100)
MONOCYTES # BLD AUTO: 0.5 K/UL — SIGNIFICANT CHANGE UP (ref 0–0.9)
MONOCYTES NFR BLD AUTO: 6.7 % — SIGNIFICANT CHANGE UP (ref 2–14)
NEUTROPHILS # BLD AUTO: 4.52 K/UL — SIGNIFICANT CHANGE UP (ref 1.8–7.4)
NEUTROPHILS NFR BLD AUTO: 60.7 % — SIGNIFICANT CHANGE UP (ref 43–77)
PLATELET # BLD AUTO: 320 K/UL — SIGNIFICANT CHANGE UP (ref 150–400)
POTASSIUM SERPL-MCNC: 4 MMOL/L — SIGNIFICANT CHANGE UP (ref 3.5–5.3)
POTASSIUM SERPL-SCNC: 4 MMOL/L — SIGNIFICANT CHANGE UP (ref 3.5–5.3)
PROT SERPL-MCNC: 7.2 G/DL — SIGNIFICANT CHANGE UP (ref 6.6–8.7)
RBC # BLD: 4.73 M/UL — SIGNIFICANT CHANGE UP (ref 4.2–5.8)
RBC # FLD: 12.9 % — SIGNIFICANT CHANGE UP (ref 10.3–14.5)
SARS-COV-2 RNA SPEC QL NAA+PROBE: SIGNIFICANT CHANGE UP
SODIUM SERPL-SCNC: 140 MMOL/L — SIGNIFICANT CHANGE UP (ref 135–145)
WBC # BLD: 7.45 K/UL — SIGNIFICANT CHANGE UP (ref 3.8–10.5)
WBC # FLD AUTO: 7.45 K/UL — SIGNIFICANT CHANGE UP (ref 3.8–10.5)

## 2022-10-07 PROCEDURE — 99223 1ST HOSP IP/OBS HIGH 75: CPT

## 2022-10-07 PROCEDURE — 73630 X-RAY EXAM OF FOOT: CPT | Mod: 26,RT

## 2022-10-07 PROCEDURE — 12345: CPT | Mod: NC

## 2022-10-07 PROCEDURE — 99285 EMERGENCY DEPT VISIT HI MDM: CPT

## 2022-10-07 PROCEDURE — 99222 1ST HOSP IP/OBS MODERATE 55: CPT

## 2022-10-07 RX ORDER — GABAPENTIN 400 MG/1
1 CAPSULE ORAL
Qty: 0 | Refills: 0 | DISCHARGE

## 2022-10-07 RX ORDER — LANOLIN ALCOHOL/MO/W.PET/CERES
3 CREAM (GRAM) TOPICAL AT BEDTIME
Refills: 0 | Status: DISCONTINUED | OUTPATIENT
Start: 2022-10-07 | End: 2022-10-09

## 2022-10-07 RX ORDER — ONDANSETRON 8 MG/1
4 TABLET, FILM COATED ORAL EVERY 8 HOURS
Refills: 0 | Status: DISCONTINUED | OUTPATIENT
Start: 2022-10-07 | End: 2022-10-09

## 2022-10-07 RX ORDER — CLOTRIMAZOLE AND BETAMETHASONE DIPROPIONATE 10; .5 MG/G; MG/G
1 CREAM TOPICAL
Refills: 0 | Status: DISCONTINUED | OUTPATIENT
Start: 2022-10-07 | End: 2022-10-09

## 2022-10-07 RX ORDER — PIPERACILLIN AND TAZOBACTAM 4; .5 G/20ML; G/20ML
3.38 INJECTION, POWDER, LYOPHILIZED, FOR SOLUTION INTRAVENOUS ONCE
Refills: 0 | Status: COMPLETED | OUTPATIENT
Start: 2022-10-07 | End: 2022-10-07

## 2022-10-07 RX ORDER — KETOROLAC TROMETHAMINE 30 MG/ML
15 SYRINGE (ML) INJECTION ONCE
Refills: 0 | Status: DISCONTINUED | OUTPATIENT
Start: 2022-10-07 | End: 2022-10-07

## 2022-10-07 RX ORDER — CEFAZOLIN SODIUM 1 G
1000 VIAL (EA) INJECTION EVERY 8 HOURS
Refills: 0 | Status: DISCONTINUED | OUTPATIENT
Start: 2022-10-07 | End: 2022-10-09

## 2022-10-07 RX ORDER — TRAMADOL HYDROCHLORIDE 50 MG/1
25 TABLET ORAL
Refills: 0 | Status: DISCONTINUED | OUTPATIENT
Start: 2022-10-07 | End: 2022-10-09

## 2022-10-07 RX ORDER — QUETIAPINE FUMARATE 200 MG/1
100 TABLET, FILM COATED ORAL DAILY
Refills: 0 | Status: DISCONTINUED | OUTPATIENT
Start: 2022-10-07 | End: 2022-10-09

## 2022-10-07 RX ORDER — GABAPENTIN 400 MG/1
600 CAPSULE ORAL
Refills: 0 | Status: DISCONTINUED | OUTPATIENT
Start: 2022-10-07 | End: 2022-10-09

## 2022-10-07 RX ORDER — QUETIAPINE FUMARATE 200 MG/1
1 TABLET, FILM COATED ORAL
Qty: 0 | Refills: 0 | DISCHARGE

## 2022-10-07 RX ORDER — FLUCONAZOLE 150 MG/1
200 TABLET ORAL DAILY
Refills: 0 | Status: DISCONTINUED | OUTPATIENT
Start: 2022-10-07 | End: 2022-10-09

## 2022-10-07 RX ORDER — ACETAMINOPHEN 500 MG
650 TABLET ORAL EVERY 6 HOURS
Refills: 0 | Status: DISCONTINUED | OUTPATIENT
Start: 2022-10-07 | End: 2022-10-09

## 2022-10-07 RX ORDER — VANCOMYCIN HCL 1 G
1000 VIAL (EA) INTRAVENOUS ONCE
Refills: 0 | Status: COMPLETED | OUTPATIENT
Start: 2022-10-07 | End: 2022-10-07

## 2022-10-07 RX ADMIN — Medication 650 MILLIGRAM(S): at 12:02

## 2022-10-07 RX ADMIN — Medication 15 MILLIGRAM(S): at 05:31

## 2022-10-07 RX ADMIN — Medication 600 MILLIGRAM(S): at 03:52

## 2022-10-07 RX ADMIN — Medication 1000 MILLIGRAM(S): at 21:26

## 2022-10-07 RX ADMIN — Medication 1000 MILLIGRAM(S): at 14:39

## 2022-10-07 RX ADMIN — Medication 650 MILLIGRAM(S): at 12:04

## 2022-10-07 RX ADMIN — Medication 15 MILLIGRAM(S): at 03:54

## 2022-10-07 RX ADMIN — PIPERACILLIN AND TAZOBACTAM 200 GRAM(S): 4; .5 INJECTION, POWDER, LYOPHILIZED, FOR SOLUTION INTRAVENOUS at 06:43

## 2022-10-07 RX ADMIN — Medication 1000 MILLIGRAM(S): at 06:43

## 2022-10-07 RX ADMIN — QUETIAPINE FUMARATE 100 MILLIGRAM(S): 200 TABLET, FILM COATED ORAL at 12:00

## 2022-10-07 RX ADMIN — GABAPENTIN 600 MILLIGRAM(S): 400 CAPSULE ORAL at 18:40

## 2022-10-07 RX ADMIN — Medication 250 MILLIGRAM(S): at 03:52

## 2022-10-07 RX ADMIN — CLOTRIMAZOLE AND BETAMETHASONE DIPROPIONATE 1 APPLICATION(S): 10; .5 CREAM TOPICAL at 18:40

## 2022-10-07 RX ADMIN — Medication 100 MILLIGRAM(S): at 03:52

## 2022-10-07 NOTE — ED PROVIDER NOTE - PHYSICAL EXAMINATION
General-alert and oriented to person place and time, nontoxic appearing, pleasant cooperative, NAD  HEENT-normocephalic, atraumatic, NT to palp, EOMI, PERRLA, no conjunctival injections,   Cardio-s1,s2 present, regular rate and rhythm  Resp- talks in full sentences, symmetrical chest rise, CTA bilat, no evidence of wheezes, rhonchi noted  Abdomen- bowel sounds presnt in all 4 quadrants, soft, NT/ND, no guarding, no rebound tenderness  MSK- moves all extremities, able to ambulate without issues  Skin- abrasion of the right 3rd digit, with erythema streaking up the right foot, swelling appreciated  Back- nt to palp of cervical, thoracic, lumbar spine, nt to palp of paraspinal m., No CVA tenderness  Neuro- no focal deficits, sensation intact

## 2022-10-07 NOTE — H&P ADULT - NSHPPHYSICALEXAM_GEN_ALL_CORE
Vital Signs Last 24 Hrs  T(C): 36.3 (07 Oct 2022 05:10), Max: 36.8 (07 Oct 2022 00:06)  T(F): 97.4 (07 Oct 2022 05:10), Max: 98.3 (07 Oct 2022 00:06)  HR: 68 (07 Oct 2022 05:10) (68 - 96)  BP: 92/59 (07 Oct 2022 05:10) (92/59 - 127/81)  BP(mean): --  RR: 20 (07 Oct 2022 05:10) (20 - 20)  SpO2: 97% (07 Oct 2022 05:10) (97% - 97%)    Parameters below as of 07 Oct 2022 00:06  Patient On (Oxygen Delivery Method): room air

## 2022-10-07 NOTE — CHART NOTE - NSCHARTNOTEFT_GEN_A_CORE
admitted overnight  labs/imaging/h&p reviewed    right foot with improved erythema, swelling persists. skin breakdown noted at site of cut on 3rd toe.      c/w IV abx  ID eval  likely dc soon

## 2022-10-07 NOTE — ED PROVIDER NOTE - OBJECTIVE STATEMENT
47 y/o male with hx of anxiety presents to the ED c/o cellulitis of the right foot for 11 days. Notes he was initially seen at [facility] 7 days ago for cellulitis of 3rd digit after he scratched it 4 days prior. Pt AMA'd at that time after he was offered observation for iv abx, dc'd on clinda, returned 5 days ago after infection did not improve. Stayed in observation for iv abx and was dc'd home on Augmentin. pt had been compliant with medications. Notes he saw podiatry earlier today who sent him to ED for IV abx. Notes the redness and swelling had worsened past the line that was marked. NO fevers, no chills, no nausea or vomiting.
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
## 2022-10-07 NOTE — ED ADULT NURSE NOTE - OBJECTIVE STATEMENT
Assumed care of pt at 0300 in . Pt A&Ox4 c/o right foot pain and infection, the pt states that he has a right foot infection, the pt's right foot is swollen and red from a cut he had, the pt was told by his podiatrist to come to [facility] for IV antibiotics, pt is resting comfortably showing no signs of respiratory distress or pain, the pt is calm and cooperative, the pt denies N/V/D/CP/SOB

## 2022-10-07 NOTE — ED PROVIDER NOTE - ATTENDING APP SHARED VISIT CONTRIBUTION OF CARE
Statement Selected Miguel GONASLEZ: I have fully participated in the care of this patient. I have made amendments to the documentation where appropriate and otherwise agree with the history, physical exam, and plan as documented by the ACP. My brief assessment is as follows:    46y M presenting for persistent foot cellulitis despite oral antibiotics. Was seen by podiatry and referred to the ED for IV antibiotics. Pt with stable VS. Empiric antibiotics ordered. Labs, cultures obtained. Podiatry consult, admission.

## 2022-10-07 NOTE — ED PROVIDER NOTE - IV ALTEPLASE DOOR HIDDEN
Subjective:      Patient ID: Dianne Alejo is a 35 y.o. female.    Chief Complaint:  Thyroid Problem (consult )    History of Present Illness  Dianne Alejo presents today for Evaluation & management of multiple thyroid nodules & hypothyroidism. This is her first visit with me.     Diagnosed in 2006 with hypothyroidism  With regards to her hypothyroidism:    Current medication:  generic lt4 50 mcg daily     Takes thyroid medication properly without food first thing in the morning     current symptoms:   + weight gain  + Fatigue   Denies Constipation   + Hair loss  Denies Brittle nails  Denies Mental fog   No cp or sob  + occ palpations   Denies heat intolerance  + cold intolerance     Ref. Range 8/24/2018 07:08   TSH Latest Ref Range: 0.400 - 4.000 uIU/mL 2.238   T3, Total Latest Ref Range: 60 - 180 ng/dL 109   Free T4 Latest Ref Range: 0.71 - 1.51 ng/dL 1.17     The thyroid nodule was found on exam in 2006 per pcp   Biopsy in 2008- benign     It was confirmed on thyroid u/s  8/27/2018  FINDINGS:  Right lobe 4.5 x 1.6 x 1.4 cm and left lobe 4.4 x 1.1 x 1.6 cm.  Isthmus 0.3 cm.    Multinodular goiter again noted.  Right lobe heterogeneous nodules including upper pole 0.8, and 0.5, 2 areas midpole and lower pole 0.6 cm.  Left lobe 0.6 cm lower pole, was 0.8 cm.  Lymph node left posterior cervical triangle 1.7 cm.      Impression     Multinodular goiter with more identifiable small nodules.     No difficulty breathing or swallowing   No voice changes  No FH of thyroid cancer  No personal history of radiation treatment or exposure     Review of Systems   Constitutional: Positive for fatigue (gain ) and unexpected weight change.   Eyes: Negative for visual disturbance.   Respiratory: Negative for cough and shortness of breath.    Cardiovascular: Positive for palpitations (occ). Negative for chest pain.   Gastrointestinal: Negative for abdominal pain.   Endocrine: Positive for cold intolerance. Negative for heat  intolerance, polydipsia, polyphagia and polyuria.   Musculoskeletal: Negative for arthralgias.   Skin: Negative for wound.   Neurological: Negative for headaches.   Hematological: Does not bruise/bleed easily.   Psychiatric/Behavioral: Negative for sleep disturbance.     Objective:   Physical Exam   Constitutional: She appears well-developed.   HENT:   Right Ear: External ear normal.   Left Ear: External ear normal.   Nose: Nose normal.   Hearing Normal     Neck: No tracheal deviation present. Thyromegaly present.   Cardiovascular: Normal rate.   No murmur heard.  No edema present   Pulmonary/Chest: Effort normal and breath sounds normal.   Abdominal: Soft. She exhibits no mass. No hernia.   Neurological: She is alert. No cranial nerve deficit or sensory deficit.   Skin: No rash noted.   + nodules.   Psychiatric: She has a normal mood and affect. Judgment normal.   Vitals reviewed.    Body mass index is 27.43 kg/m².    Lab Review:   No results found for: HGBA1C  Lab Results   Component Value Date    CHOL 204 (H) 06/25/2016    HDL 65 06/25/2016    LDLCALC 127.8 06/25/2016    TRIG 56 06/25/2016    CHOLHDL 31.9 06/25/2016     Lab Results   Component Value Date     06/15/2018    K 3.9 06/15/2018     06/15/2018    CO2 25 06/15/2018    GLU 87 06/15/2018    BUN 9 06/15/2018    CREATININE 0.8 06/15/2018    CALCIUM 9.3 06/15/2018    PROT 7.3 06/15/2018    ALBUMIN 3.9 06/15/2018    BILITOT 0.5 06/15/2018    ALKPHOS 66 06/15/2018    AST 14 06/15/2018    ALT 9 (L) 06/15/2018    ANIONGAP 8 06/15/2018    ESTGFRAFRICA >60.0 06/15/2018    EGFRNONAA >60.0 06/15/2018    TSH 2.238 08/24/2018     Assessment and Plan     1. Hypothyroidism due to Hashimoto's thyroiditis  TSH    T4, free    Thyroid peroxidase antibody    Thyrotropin receptor antibody   2. Multinodular goiter       Hypothyroidism  -- Clinically hypothyroid and biochemically euthyroid  -- Goal is a normal TSH  -- Check TFTs today with antibodies and adjust  dosage accordingly   -- Avoid exogenous hyperthyroidism as this can accelerate bone loss and increase risk of CV complications.  -- Advised to take LT4 on an empty stomach with water and to wait 30-45 minutes before eating or taking other medications   -- Reviewed usual  times of thyroid hormone  changes- call if start/ stop ocps, weight changes, attempting conception and during pregnancy   -- Reviewed that symptoms of hypothyroidism may not correlate with tsh, and a normal TSH is the goal of therapy.....  symptoms are not a justification for over treatment     Ok to take selenium 100 mcg bid to try and decrease thyroid antibodies     Multinodular goiter  -- multiple sub centimeter nodules, none meet criteria for FNA  -- denies compressive symptoms as well.  -- thyroid US in 2 years       Follow-up in about 1 year (around 9/26/2019).   show

## 2022-10-07 NOTE — H&P ADULT - NSCORESITESY/N_GEN_A_CORE_RD
Verified Results  Fecal Occult Blood, Tube Test 21Mar2018 12:01AM JUDY SY   [Mar 22, 2018 1:03PM JUDY SY]  Stool test negative for blood     Test Name Result Flag Reference   OCCULT BLOOD, TUBE TEST NEGATIVE  NEGATIVE        No

## 2022-10-07 NOTE — CONSULT NOTE ADULT - ASSESSMENT
A:  Right foot cellulitis  Tinea pedis    P:  Pt seen and evaluated  Pt afebrile without leukocytosis  Right foot evaluated  Cellulitis seems to be secondarily to tinea pedis   Erythema seems to have improved, smaller than where it was marked previously   No dressing needed at this time  Recommend antifungal cream in the interspaces of right foot  Will continue to follow  D/w and evaluated at bedside with attending Dr. Rush    A:  Right foot cellulitis  Tinea pedis    P:  Pt seen and evaluated  Pt afebrile without leukocytosis  Right foot evaluated  Cellulitis seems to be secondarily to tinea pedis   Erythema seems to have improved, smaller than where it was marked previously   No dressing needed at this time  Recommend antifungal cream in the interspaces of right foot  Recommend IV abx per ID recs  Uncontrolled glucose level can cause cellulitis 2/2 fungal infection   Will continue to follow  D/w and evaluated at bedside with attending Dr. Rush

## 2022-10-07 NOTE — H&P ADULT - HISTORY OF PRESENT ILLNESS
47 y/o male with PMH of anxiety was sent to the ED from podiatry office for IV antibiotic treatment. Patient has been seen in the ED about three times for cellulitis; seen by podiatry was treated with Augmentin and Clindamycin. He saw podiatry yesterday noted to have worsening redness and swelling, patient reported complaint with medication; was told to come the ED for IV antibiotic. Patient has no fevers, chills, nausea, vomiting, chest pain, shortness of breath, abdominal pain, change in bowel/urinary habit.

## 2022-10-07 NOTE — ED ADULT TRIAGE NOTE - CHIEF COMPLAINT QUOTE
Pt C/O right foot infection. Foot is swollen and red from a cut he had. Pt was told by his podiatrist to come to the ED for IV abx.

## 2022-10-07 NOTE — H&P ADULT - ASSESSMENT
47 y/o male with PMH of anxiety was sent to the ED from podiatry office for IV antibiotic treatment. Patient has been seen in the ED about three times for cellulitis; seen by podiatry was treated with Augmentin and Clindamycin. He saw podiatry yesterday noted to have worsening redness and swelling, patient reported complaint with medication; was told to come the ED for IV antibiotic.    Right foot cellulitis failed outpatient   Admit to medical floor   Clindamycin and Vancomycin given in the ED, will continue with Ancef   Podiatry consulted   Tylenol PRN for pain/fever     Anxiety   Propranolol 10mg tid   Patient on Gabapentin 600mg bid and Seroquel 100mg (likely for depression?)     Supportive   DVT prophylaxis: ambulate as tolerated   Diet: regular     Plan of care discussed with patient

## 2022-10-07 NOTE — ED ADULT NURSE REASSESSMENT NOTE - NS ED NURSE REASSESS COMMENT FT1
received report from Keith cho and assumed care of pt. pt sitting calm in bed. a and o x3. breathing even and unlabored. admitted and awaiting bed placement. will continue to monitor.
sitting calm in bed eating meal tray. a and o x3. breathing even and unlabored. pt reports mild pain at this time. medicated as per orders. will continue to monitor.

## 2022-10-07 NOTE — ED PROVIDER NOTE - CLINICAL SUMMARY MEDICAL DECISION MAKING FREE TEXT BOX
47 y/o male with hx of anxiety presents to the ED c/o cellulitis of the right foot for 11 days. esr, crp, cbc, blood cultures, failed outpatient and observation from prior visits, seen by podiatry earlier today who sent in for iv abx,

## 2022-10-08 LAB
CK SERPL-CCNC: 80 U/L — SIGNIFICANT CHANGE UP (ref 30–200)
CULTURE RESULTS: SIGNIFICANT CHANGE UP
CULTURE RESULTS: SIGNIFICANT CHANGE UP
SPECIMEN SOURCE: SIGNIFICANT CHANGE UP
SPECIMEN SOURCE: SIGNIFICANT CHANGE UP
URATE SERPL-MCNC: 4.1 MG/DL — SIGNIFICANT CHANGE UP (ref 3.4–7)

## 2022-10-08 PROCEDURE — 99232 SBSQ HOSP IP/OBS MODERATE 35: CPT

## 2022-10-08 RX ORDER — ENOXAPARIN SODIUM 100 MG/ML
40 INJECTION SUBCUTANEOUS EVERY 24 HOURS
Refills: 0 | Status: DISCONTINUED | OUTPATIENT
Start: 2022-10-08 | End: 2022-10-09

## 2022-10-08 RX ADMIN — Medication 1000 MILLIGRAM(S): at 05:31

## 2022-10-08 RX ADMIN — ENOXAPARIN SODIUM 40 MILLIGRAM(S): 100 INJECTION SUBCUTANEOUS at 17:51

## 2022-10-08 RX ADMIN — TRAMADOL HYDROCHLORIDE 25 MILLIGRAM(S): 50 TABLET ORAL at 12:42

## 2022-10-08 RX ADMIN — GABAPENTIN 600 MILLIGRAM(S): 400 CAPSULE ORAL at 17:16

## 2022-10-08 RX ADMIN — QUETIAPINE FUMARATE 100 MILLIGRAM(S): 200 TABLET, FILM COATED ORAL at 12:35

## 2022-10-08 RX ADMIN — Medication 1000 MILLIGRAM(S): at 22:32

## 2022-10-08 RX ADMIN — CLOTRIMAZOLE AND BETAMETHASONE DIPROPIONATE 1 APPLICATION(S): 10; .5 CREAM TOPICAL at 05:32

## 2022-10-08 RX ADMIN — TRAMADOL HYDROCHLORIDE 25 MILLIGRAM(S): 50 TABLET ORAL at 13:14

## 2022-10-08 RX ADMIN — CLOTRIMAZOLE AND BETAMETHASONE DIPROPIONATE 1 APPLICATION(S): 10; .5 CREAM TOPICAL at 17:17

## 2022-10-08 RX ADMIN — Medication 1000 MILLIGRAM(S): at 12:36

## 2022-10-08 RX ADMIN — GABAPENTIN 600 MILLIGRAM(S): 400 CAPSULE ORAL at 05:33

## 2022-10-08 RX ADMIN — FLUCONAZOLE 200 MILLIGRAM(S): 150 TABLET ORAL at 12:36

## 2022-10-08 NOTE — PROGRESS NOTE ADULT - ASSESSMENT
47 y/o male with PMH of anxiety was sent to the ED from podiatry office for IV antibiotic treatment. Patient has been seen in the ED about three times for cellulitis; seen by podiatry was treated with Augmentin and Clindamycin. He saw podiatry yesterday noted to have worsening redness and swelling, patient reported complaint with medication; was told to come the ED for IV antibiotic. Pt was started on Cefazolin iv. Id and podiatry team consulted. Pt responding well to IV Abx.    Right foot cellulitis failed outpatient   Admit to medical floor   - c/w cefazolin, clotrimazol oitment, fluiconazole 200 mg daily  -  ID and podiatry consult noted  - supportive care with pain meds and antiperetics     Anxiety   Propranolol 10mg decreased to bid given soft BP   Patient on Gabapentin 600mg bid and Seroquel 100mg (likely for depression?)       DVT prophylaxis: Lovenox   Dispo - may d/c w/in next 24 hr

## 2022-10-08 NOTE — PROGRESS NOTE ADULT - SUBJECTIVE AND OBJECTIVE BOX
[facility] Division of Hospital Medicine    Chief Complaint:  cellulitis     SUBJECTIVE: reports feeling better     OVERNIGHT EVENTS: none reported     Patient denies chest pain, SOB, abd pain, N/V, fever, chills, dysuria or any other complaints. All remainder ROS negative.     MEDICATIONS  (STANDING):  ceFAZolin  Injectable. 1000 milliGRAM(s) IV Push every 8 hours  clotrimazole/betamethasone Cream 1 Application(s) Topical two times a day  fluconAZOLE   Tablet 200 milliGRAM(s) Oral daily  gabapentin 600 milliGRAM(s) Oral two times a day  propranolol 10 milliGRAM(s) Oral three times a day  QUEtiapine 100 milliGRAM(s) Oral daily    MEDICATIONS  (PRN):  acetaminophen     Tablet .. 650 milliGRAM(s) Oral every 6 hours PRN Temp greater or equal to 38C (100.4F), Mild Pain (1 - 3)  aluminum hydroxide/magnesium hydroxide/simethicone Suspension 30 milliLiter(s) Oral every 4 hours PRN Dyspepsia  melatonin 3 milliGRAM(s) Oral at bedtime PRN Insomnia  ondansetron Injectable 4 milliGRAM(s) IV Push every 8 hours PRN Nausea and/or Vomiting  traMADol 25 milliGRAM(s) Oral four times a day PRN mod to severe pain        I&O's Summary      PHYSICAL EXAM:  Vital Signs Last 24 Hrs  T(C): 36.9 (08 Oct 2022 15:45), Max: 37.1 (08 Oct 2022 09:53)  T(F): 98.5 (08 Oct 2022 15:45), Max: 98.8 (08 Oct 2022 09:53)  HR: 70 (08 Oct 2022 15:45) (62 - 75)  BP: 116/72 (08 Oct 2022 15:45) (95/63 - 126/64)  BP(mean): --  RR: 18 (08 Oct 2022 15:45) (16 - 18)  SpO2: 92% (08 Oct 2022 15:45) (92% - 98%)    Parameters below as of 08 Oct 2022 15:45  Patient On (Oxygen Delivery Method): room air            CONSTITUTIONAL: NAD, well-developed, well-groomed  ENMT: Moist oral mucosa, no pharyngeal injection or exudates; normal dentition; No JVD  RESPIRATORY: Normal respiratory effort; lungs are clear to auscultation bilaterally  CARDIOVASCULAR: Regular rate and rhythm, normal S1 and S2, no murmur/rub/gallop; No lower extremity edema; Peripheral pulses are 2+ bilaterally  ABDOMEN: Nontender to palpation, normoactive bowel sounds, no rebound/guarding; No hepatosplenomegaly  MUSCLOSKELETAL:  no clubbing or cyanosis of digits; no joint swelling or tenderness to palpation  PSYCH: A+O to person, place, and time; affect appropriate  NEUROLOGY: CN 2-12 are intact and symmetric; no gross sensory deficits; was observed moving all 4 ext against gravity cooperating with exam.   SKIN: much imrpoved erythema, but still with edema and tenderness on palpation    LABS:                        14.3   7.45  )-----------( 320      ( 07 Oct 2022 03:35 )             41.7     10-07    140  |  103  |  14.9  ----------------------------<  132<H>  4.0   |  27.0  |  0.70    Ca    9.2      07 Oct 2022 03:35    TPro  7.2  /  Alb  4.3  /  TBili  <0.2<L>  /  DBili  x   /  AST  21  /  ALT  13  /  AlkPhos  78  10-07      CARDIAC MARKERS ( 08 Oct 2022 00:15 )  x     / x     / 80 U/L / x     / x              Culture - Blood (collected 07 Oct 2022 03:40)  Source: .Blood Blood  Preliminary Report (08 Oct 2022 09:01):    No growth to date.    Culture - Blood (collected 07 Oct 2022 03:35)  Source: .Blood Blood  Preliminary Report (08 Oct 2022 09:01):    No growth to date.      CAPILLARY BLOOD GLUCOSE            RADIOLOGY & ADDITIONAL TESTS:  Results Reviewed:   Imaging Personally Reviewed:  Electrocardiogram Personally Reviewed:

## 2022-10-09 ENCOUNTER — TRANSCRIPTION ENCOUNTER (OUTPATIENT)
Age: 47
End: 2022-10-09

## 2022-10-09 VITALS
SYSTOLIC BLOOD PRESSURE: 114 MMHG | HEART RATE: 67 BPM | DIASTOLIC BLOOD PRESSURE: 69 MMHG | OXYGEN SATURATION: 97 % | RESPIRATION RATE: 18 BRPM | TEMPERATURE: 98 F

## 2022-10-09 LAB
ANION GAP SERPL CALC-SCNC: 10 MMOL/L — SIGNIFICANT CHANGE UP (ref 5–17)
BUN SERPL-MCNC: 17.3 MG/DL — SIGNIFICANT CHANGE UP (ref 8–20)
CALCIUM SERPL-MCNC: 9 MG/DL — SIGNIFICANT CHANGE UP (ref 8.4–10.5)
CHLORIDE SERPL-SCNC: 106 MMOL/L — SIGNIFICANT CHANGE UP (ref 98–107)
CO2 SERPL-SCNC: 27 MMOL/L — SIGNIFICANT CHANGE UP (ref 22–29)
CREAT SERPL-MCNC: 0.9 MG/DL — SIGNIFICANT CHANGE UP (ref 0.5–1.3)
EGFR: 106 ML/MIN/1.73M2 — SIGNIFICANT CHANGE UP
GLUCOSE SERPL-MCNC: 100 MG/DL — HIGH (ref 70–99)
HCT VFR BLD CALC: 44.8 % — SIGNIFICANT CHANGE UP (ref 39–50)
HGB BLD-MCNC: 14.9 G/DL — SIGNIFICANT CHANGE UP (ref 13–17)
MCHC RBC-ENTMCNC: 29.8 PG — SIGNIFICANT CHANGE UP (ref 27–34)
MCHC RBC-ENTMCNC: 33.3 GM/DL — SIGNIFICANT CHANGE UP (ref 32–36)
MCV RBC AUTO: 89.6 FL — SIGNIFICANT CHANGE UP (ref 80–100)
PLATELET # BLD AUTO: 312 K/UL — SIGNIFICANT CHANGE UP (ref 150–400)
POTASSIUM SERPL-MCNC: 4 MMOL/L — SIGNIFICANT CHANGE UP (ref 3.5–5.3)
POTASSIUM SERPL-SCNC: 4 MMOL/L — SIGNIFICANT CHANGE UP (ref 3.5–5.3)
RBC # BLD: 5 M/UL — SIGNIFICANT CHANGE UP (ref 4.2–5.8)
RBC # FLD: 13.1 % — SIGNIFICANT CHANGE UP (ref 10.3–14.5)
SODIUM SERPL-SCNC: 143 MMOL/L — SIGNIFICANT CHANGE UP (ref 135–145)
WBC # BLD: 6.41 K/UL — SIGNIFICANT CHANGE UP (ref 3.8–10.5)
WBC # FLD AUTO: 6.41 K/UL — SIGNIFICANT CHANGE UP (ref 3.8–10.5)

## 2022-10-09 PROCEDURE — 87040 BLOOD CULTURE FOR BACTERIA: CPT

## 2022-10-09 PROCEDURE — 80053 COMPREHEN METABOLIC PANEL: CPT

## 2022-10-09 PROCEDURE — 85027 COMPLETE CBC AUTOMATED: CPT

## 2022-10-09 PROCEDURE — U0003: CPT

## 2022-10-09 PROCEDURE — 85652 RBC SED RATE AUTOMATED: CPT

## 2022-10-09 PROCEDURE — 99239 HOSP IP/OBS DSCHRG MGMT >30: CPT

## 2022-10-09 PROCEDURE — 96375 TX/PRO/DX INJ NEW DRUG ADDON: CPT

## 2022-10-09 PROCEDURE — 82550 ASSAY OF CK (CPK): CPT

## 2022-10-09 PROCEDURE — 86140 C-REACTIVE PROTEIN: CPT

## 2022-10-09 PROCEDURE — 36415 COLL VENOUS BLD VENIPUNCTURE: CPT

## 2022-10-09 PROCEDURE — 84550 ASSAY OF BLOOD/URIC ACID: CPT

## 2022-10-09 PROCEDURE — 96365 THER/PROPH/DIAG IV INF INIT: CPT

## 2022-10-09 PROCEDURE — 80048 BASIC METABOLIC PNL TOTAL CA: CPT

## 2022-10-09 PROCEDURE — U0005: CPT

## 2022-10-09 PROCEDURE — 73630 X-RAY EXAM OF FOOT: CPT

## 2022-10-09 PROCEDURE — 85025 COMPLETE CBC W/AUTO DIFF WBC: CPT

## 2022-10-09 PROCEDURE — 99285 EMERGENCY DEPT VISIT HI MDM: CPT | Mod: 25

## 2022-10-09 RX ORDER — CEPHALEXIN 500 MG
500 CAPSULE ORAL EVERY 12 HOURS
Refills: 0 | Status: DISCONTINUED | OUTPATIENT
Start: 2022-10-09 | End: 2022-10-09

## 2022-10-09 RX ORDER — CEPHALEXIN 500 MG
1 CAPSULE ORAL
Qty: 10 | Refills: 0
Start: 2022-10-09 | End: 2022-10-13

## 2022-10-09 RX ORDER — CLOTRIMAZOLE AND BETAMETHASONE DIPROPIONATE 10; .5 MG/G; MG/G
1 CREAM TOPICAL
Qty: 0 | Refills: 0 | DISCHARGE
Start: 2022-10-09

## 2022-10-09 RX ORDER — PROPRANOLOL HCL 160 MG
1 CAPSULE, EXTENDED RELEASE 24HR ORAL
Qty: 0 | Refills: 0 | DISCHARGE
Start: 2022-10-09

## 2022-10-09 RX ORDER — PROPRANOLOL HCL 160 MG
1 CAPSULE, EXTENDED RELEASE 24HR ORAL
Qty: 0 | Refills: 0 | DISCHARGE

## 2022-10-09 RX ORDER — FLUCONAZOLE 150 MG/1
1 TABLET ORAL
Qty: 12 | Refills: 0
Start: 2022-10-09 | End: 2022-10-20

## 2022-10-09 RX ADMIN — Medication 500 MILLIGRAM(S): at 12:44

## 2022-10-09 RX ADMIN — CLOTRIMAZOLE AND BETAMETHASONE DIPROPIONATE 1 APPLICATION(S): 10; .5 CREAM TOPICAL at 05:20

## 2022-10-09 RX ADMIN — GABAPENTIN 600 MILLIGRAM(S): 400 CAPSULE ORAL at 05:13

## 2022-10-09 RX ADMIN — FLUCONAZOLE 200 MILLIGRAM(S): 150 TABLET ORAL at 12:43

## 2022-10-09 RX ADMIN — Medication 1000 MILLIGRAM(S): at 05:13

## 2022-10-09 RX ADMIN — Medication 650 MILLIGRAM(S): at 06:52

## 2022-10-09 NOTE — DISCHARGE NOTE PROVIDER - ATTENDING DISCHARGE PHYSICAL EXAMINATION:
Vital Signs Last 24 Hrs  T(C): 36.8 (09 Oct 2022 09:56), Max: 37.3 (08 Oct 2022 17:42)  T(F): 98.3 (09 Oct 2022 09:56), Max: 99.2 (08 Oct 2022 17:42)  HR: 67 (09 Oct 2022 09:56) (64 - 91)  BP: 114/69 (09 Oct 2022 09:56) (98/62 - 116/72)  BP(mean): --  RR: 18 (09 Oct 2022 09:56) (18 - 18)  SpO2: 97% (09 Oct 2022 09:56) (92% - 100%)    Parameters below as of 09 Oct 2022 09:56  Patient On (Oxygen Delivery Method): room air    CONSTITUTIONAL: NAD, well-developed, well-groomed  ENMT: Moist oral mucosa, no pharyngeal injection or exudates; normal dentition; No JVD  RESPIRATORY: Normal respiratory effort; lungs are clear to auscultation bilaterally  CARDIOVASCULAR: Regular rate and rhythm, normal S1 and S2, no murmur/rub/gallop; No lower extremity edema; Peripheral pulses are 2+ bilaterally  ABDOMEN: Nontender to palpation, normoactive bowel sounds, no rebound/guarding; No hepatosplenomegaly  MUSCLOSKELETAL:  no clubbing or cyanosis of digits; no joint swelling or tenderness to palpation  PSYCH: A+O to person, place, and time; affect appropriate  NEUROLOGY: CN 2-12 are intact and symmetric; no gross sensory deficits; was observed moving all 4 ext against gravity cooperating with exam.   SKIN: resolved erythema, resolving edema, no more tenderness on palpation

## 2022-10-09 NOTE — DISCHARGE NOTE PROVIDER - HOSPITAL COURSE
45 y/o male with PMH of anxiety was sent to the ED from podiatry office for IV antibiotic treatment. Patient has been seen in the ED about three times for cellulitis; seen by podiatry was treated with Augmentin and Clindamycin. He saw podiatry yesterday noted to have worsening redness and swelling, patient reported complaint with medication; was told to come the ED for IV antibiotic. Pt was started on Cefazolin iv. Id and podiatry team consulted. Pt responding well to IV Abx with resolved erythema and edema, stable for d/c home with close f/u with PMD.

## 2022-10-09 NOTE — DISCHARGE NOTE NURSING/CASE MANAGEMENT/SOCIAL WORK - PATIENT PORTAL LINK FT
You can access the FollowMyHealth Patient Portal offered by [facility] by registering at the following website: [email or web address]. By joining [employer]’s FollowMyHealth portal, you will also be able to view your health information using other applications (apps) compatible with our system.

## 2022-10-09 NOTE — DISCHARGE NOTE PROVIDER - PROVIDER TOKENS
FREE:[LAST:[Primary medical doctor],PHONE:[(   )    -],FAX:[(   )    -],FOLLOWUP:[2 weeks],ESTABLISHEDPATIENT:[T]],PROVIDER:[TOKEN:[[id number]],FOLLOWUP:[2 weeks],ESTABLISHEDPATIENT:[T]]

## 2022-10-09 NOTE — DISCHARGE NOTE PROVIDER - NSDCCPCAREPLAN_GEN_ALL_CORE_FT
PRINCIPAL DISCHARGE DIAGNOSIS  Diagnosis: Cellulitis of right foot  Assessment and Plan of Treatment: - please comlplete 5 more days of Keflex 500 mg twice a day  - follow up with your PMD w/in next 7-14 days.      SECONDARY DISCHARGE DIAGNOSES  Diagnosis: Tinea, foot  Assessment and Plan of Treatment: - continue fluconazole 200 mg daily for next 12 days  - cotnineu to use cream twice daily as directed  - follow up with podiatrist w/in next 7-14 days    Diagnosis: Anxiety disorder  Assessment and Plan of Treatment: - continue take your medication as directed  -  we did change dose of propranolol to 10 mg bid given your blood pressure was low during hospital stay

## 2022-10-09 NOTE — DISCHARGE NOTE PROVIDER - NSDCMRMEDTOKEN_GEN_ALL_CORE_FT
cephalexin 500 mg oral capsule: 1 cap(s) orally every 12 hours stop on 10/14/22  clotrimazole-betamethasone dipropionate 1%-0.05% topical cream: 1 application topically 2 times a day  Diflucan 200 mg oral tablet: 1 tab(s) orally once a day, stop on 10/21/22  gabapentin 600 mg oral tablet: 1 tab(s) orally 2 times a day  propranolol 10 mg oral tablet: 1 tab(s) orally 2 times a day  SEROquel 100 mg oral tablet: 1 tab(s) orally once a day

## 2022-10-09 NOTE — DISCHARGE NOTE NURSING/CASE MANAGEMENT/SOCIAL WORK - NSDCVIVACCINE_GEN_ALL_CORE_FT
Tdap; 30-Sep-2022 15:45; Ana Zheng (RN); [employer]; [id number] (Exp. Date: 04-Nov-2024); IntraMuscular; Deltoid Right.; 0.5 milliLiter(s); VIS (VIS Published: 09-May-2013, VIS Presented: 30-Sep-2022);

## 2022-10-12 LAB
CULTURE RESULTS: SIGNIFICANT CHANGE UP
CULTURE RESULTS: SIGNIFICANT CHANGE UP
SPECIMEN SOURCE: SIGNIFICANT CHANGE UP
SPECIMEN SOURCE: SIGNIFICANT CHANGE UP

## 2022-10-13 NOTE — ED STATDOCS - CCCP TRG CHIEF CMPLNT
99 foot infection Bilobed Flap Text: The defect edges were debeveled with a #15 scalpel blade.  Given the location of the defect and the proximity to free margins a bilobe flap was deemed most appropriate.  Using a sterile surgical marker, an appropriate bilobe flap drawn around the defect.    The area thus outlined was incised deep to adipose tissue with a #15 scalpel blade.  The skin margins were undermined to an appropriate distance in all directions utilizing iris scissors.

## 2023-01-01 ENCOUNTER — NON-APPOINTMENT (OUTPATIENT)
Age: 48
End: 2023-01-01

## 2023-01-01 ENCOUNTER — EMERGENCY (EMERGENCY)
Facility: HOSPITAL | Age: 48
LOS: 1 days | Discharge: HOME | End: 2023-01-01
Attending: EMERGENCY MEDICINE
Payer: MEDICAID

## 2023-01-01 VITALS
OXYGEN SATURATION: 97 % | RESPIRATION RATE: 15 BRPM | DIASTOLIC BLOOD PRESSURE: 89 MMHG | HEART RATE: 92 BPM | TEMPERATURE: 98 F | SYSTOLIC BLOOD PRESSURE: 135 MMHG

## 2023-01-01 VITALS
WEIGHT: 160.06 LBS | TEMPERATURE: 98 F | HEART RATE: 98 BPM | DIASTOLIC BLOOD PRESSURE: 92 MMHG | RESPIRATION RATE: 16 BRPM | SYSTOLIC BLOOD PRESSURE: 140 MMHG | OXYGEN SATURATION: 98 %

## 2023-01-01 VITALS
RESPIRATION RATE: 18 BRPM | DIASTOLIC BLOOD PRESSURE: 81 MMHG | SYSTOLIC BLOOD PRESSURE: 132 MMHG | HEART RATE: 95 BPM | OXYGEN SATURATION: 98 % | TEMPERATURE: 98 F

## 2023-01-01 DIAGNOSIS — S72.90XA UNSPECIFIED FRACTURE OF UNSPECIFIED FEMUR, INITIAL ENCOUNTER FOR CLOSED FRACTURE: Chronic | ICD-10-CM

## 2023-01-01 DIAGNOSIS — Z86.79 PERSONAL HISTORY OF OTHER DISEASES OF THE CIRCULATORY SYSTEM: Chronic | ICD-10-CM

## 2023-01-01 LAB
ALBUMIN SERPL ELPH-MCNC: 4.2 G/DL — SIGNIFICANT CHANGE UP (ref 3.3–5.2)
ALP SERPL-CCNC: 87 U/L — SIGNIFICANT CHANGE UP (ref 40–120)
ALT FLD-CCNC: 22 U/L — SIGNIFICANT CHANGE UP
ANION GAP SERPL CALC-SCNC: 10 MMOL/L — SIGNIFICANT CHANGE UP (ref 5–17)
AST SERPL-CCNC: 25 U/L — SIGNIFICANT CHANGE UP
BASOPHILS # BLD AUTO: 0.03 K/UL — SIGNIFICANT CHANGE UP (ref 0–0.2)
BASOPHILS NFR BLD AUTO: 0.4 % — SIGNIFICANT CHANGE UP (ref 0–2)
BILIRUB SERPL-MCNC: 0.2 MG/DL — LOW (ref 0.4–2)
BUN SERPL-MCNC: 10.9 MG/DL — SIGNIFICANT CHANGE UP (ref 8–20)
CALCIUM SERPL-MCNC: 9 MG/DL — SIGNIFICANT CHANGE UP (ref 8.4–10.5)
CHLORIDE SERPL-SCNC: 102 MMOL/L — SIGNIFICANT CHANGE UP (ref 96–108)
CO2 SERPL-SCNC: 28 MMOL/L — SIGNIFICANT CHANGE UP (ref 22–29)
CREAT SERPL-MCNC: 0.84 MG/DL — SIGNIFICANT CHANGE UP (ref 0.5–1.3)
EGFR: 108 ML/MIN/1.73M2 — SIGNIFICANT CHANGE UP
EOSINOPHIL # BLD AUTO: 0.07 K/UL — SIGNIFICANT CHANGE UP (ref 0–0.5)
EOSINOPHIL NFR BLD AUTO: 1 % — SIGNIFICANT CHANGE UP (ref 0–6)
GLUCOSE SERPL-MCNC: 100 MG/DL — HIGH (ref 70–99)
HCT VFR BLD CALC: 43.1 % — SIGNIFICANT CHANGE UP (ref 39–50)
HGB BLD-MCNC: 14.6 G/DL — SIGNIFICANT CHANGE UP (ref 13–17)
IMM GRANULOCYTES NFR BLD AUTO: 0.1 % — SIGNIFICANT CHANGE UP (ref 0–0.9)
LYMPHOCYTES # BLD AUTO: 1.36 K/UL — SIGNIFICANT CHANGE UP (ref 1–3.3)
LYMPHOCYTES # BLD AUTO: 19.3 % — SIGNIFICANT CHANGE UP (ref 13–44)
MCHC RBC-ENTMCNC: 30 PG — SIGNIFICANT CHANGE UP (ref 27–34)
MCHC RBC-ENTMCNC: 33.9 GM/DL — SIGNIFICANT CHANGE UP (ref 32–36)
MCV RBC AUTO: 88.5 FL — SIGNIFICANT CHANGE UP (ref 80–100)
MONOCYTES # BLD AUTO: 0.63 K/UL — SIGNIFICANT CHANGE UP (ref 0–0.9)
MONOCYTES NFR BLD AUTO: 8.9 % — SIGNIFICANT CHANGE UP (ref 2–14)
NEUTROPHILS # BLD AUTO: 4.94 K/UL — SIGNIFICANT CHANGE UP (ref 1.8–7.4)
NEUTROPHILS NFR BLD AUTO: 70.3 % — SIGNIFICANT CHANGE UP (ref 43–77)
PLATELET # BLD AUTO: 248 K/UL — SIGNIFICANT CHANGE UP (ref 150–400)
POTASSIUM SERPL-MCNC: 3.7 MMOL/L — SIGNIFICANT CHANGE UP (ref 3.5–5.3)
POTASSIUM SERPL-SCNC: 3.7 MMOL/L — SIGNIFICANT CHANGE UP (ref 3.5–5.3)
PROT SERPL-MCNC: 7.2 G/DL — SIGNIFICANT CHANGE UP (ref 6.6–8.7)
RBC # BLD: 4.87 M/UL — SIGNIFICANT CHANGE UP (ref 4.2–5.8)
RBC # FLD: 13.3 % — SIGNIFICANT CHANGE UP (ref 10.3–14.5)
SARS-COV-2 RNA SPEC QL NAA+PROBE: SIGNIFICANT CHANGE UP
SODIUM SERPL-SCNC: 140 MMOL/L — SIGNIFICANT CHANGE UP (ref 135–145)
TROPONIN T SERPL-MCNC: <0.01 NG/ML — SIGNIFICANT CHANGE UP (ref 0–0.06)
TROPONIN T SERPL-MCNC: <0.01 NG/ML — SIGNIFICANT CHANGE UP (ref 0–0.06)
WBC # BLD: 7.04 K/UL — SIGNIFICANT CHANGE UP (ref 3.8–10.5)
WBC # FLD AUTO: 7.04 K/UL — SIGNIFICANT CHANGE UP (ref 3.8–10.5)

## 2023-01-01 PROCEDURE — 93005 ELECTROCARDIOGRAM TRACING: CPT

## 2023-01-01 PROCEDURE — 99285 EMERGENCY DEPT VISIT HI MDM: CPT

## 2023-01-01 PROCEDURE — 84484 ASSAY OF TROPONIN QUANT: CPT

## 2023-01-01 PROCEDURE — 96374 THER/PROPH/DIAG INJ IV PUSH: CPT

## 2023-01-01 PROCEDURE — U0003: CPT

## 2023-01-01 PROCEDURE — 80053 COMPREHEN METABOLIC PANEL: CPT

## 2023-01-01 PROCEDURE — 96372 THER/PROPH/DIAG INJ SC/IM: CPT

## 2023-01-01 PROCEDURE — 82962 GLUCOSE BLOOD TEST: CPT

## 2023-01-01 PROCEDURE — 71045 X-RAY EXAM CHEST 1 VIEW: CPT | Mod: 26

## 2023-01-01 PROCEDURE — 36415 COLL VENOUS BLD VENIPUNCTURE: CPT

## 2023-01-01 PROCEDURE — 71045 X-RAY EXAM CHEST 1 VIEW: CPT

## 2023-01-01 PROCEDURE — 99284 EMERGENCY DEPT VISIT MOD MDM: CPT

## 2023-01-01 PROCEDURE — U0005: CPT

## 2023-01-01 PROCEDURE — 85025 COMPLETE CBC W/AUTO DIFF WBC: CPT

## 2023-01-01 PROCEDURE — 93010 ELECTROCARDIOGRAM REPORT: CPT

## 2023-01-01 PROCEDURE — 99285 EMERGENCY DEPT VISIT HI MDM: CPT | Mod: 25

## 2023-01-01 PROCEDURE — 99283 EMERGENCY DEPT VISIT LOW MDM: CPT

## 2023-01-01 RX ORDER — LIDOCAINE 4 G/100G
1 CREAM TOPICAL ONCE
Refills: 0 | Status: COMPLETED | OUTPATIENT
Start: 2023-01-01 | End: 2023-01-01

## 2023-01-01 RX ORDER — METHOCARBAMOL 500 MG/1
1500 TABLET, FILM COATED ORAL ONCE
Refills: 0 | Status: COMPLETED | OUTPATIENT
Start: 2023-01-01 | End: 2023-01-01

## 2023-01-01 RX ORDER — ASPIRIN/CALCIUM CARB/MAGNESIUM 324 MG
325 TABLET ORAL DAILY
Refills: 0 | Status: DISCONTINUED | OUTPATIENT
Start: 2023-01-01 | End: 2023-01-01

## 2023-01-01 RX ORDER — KETOROLAC TROMETHAMINE 30 MG/ML
30 SYRINGE (ML) INJECTION ONCE
Refills: 0 | Status: DISCONTINUED | OUTPATIENT
Start: 2023-01-01 | End: 2023-01-01

## 2023-01-01 RX ADMIN — Medication 325 MILLIGRAM(S): at 22:12

## 2023-01-01 RX ADMIN — Medication 1 MILLIGRAM(S): at 21:53

## 2023-01-01 RX ADMIN — LIDOCAINE 1 PATCH: 4 CREAM TOPICAL at 01:30

## 2023-01-01 RX ADMIN — Medication 30 MILLIGRAM(S): at 01:29

## 2023-01-01 RX ADMIN — METHOCARBAMOL 1500 MILLIGRAM(S): 500 TABLET, FILM COATED ORAL at 01:29

## 2023-02-02 NOTE — DISCHARGE NOTE PROVIDER - CARE PROVIDER_API CALL
Primary medical doctor,   Phone: (   )    -  Fax: (   )    -  Established Patient  Follow Up Time: 2 weeks    Bernardo Rush (EVERM)  Podiatric Medicine and Surgery  [address] [place] [address]  Phone: [phone]  Fax: [phone]  Established Patient  Follow Up Time: 2 weeks   Hydroxyzine Counseling: Patient advised that the medication is sedating and not to drive a car after taking this medication.  Patient informed of potential adverse effects including but not limited to dry mouth, urinary retention, and blurry vision.  The patient verbalized understanding of the proper use and possible adverse effects of hydroxyzine.  All of the patient's questions and concerns were addressed.

## 2023-03-06 NOTE — ED ADULT TRIAGE NOTE - DOMESTIC TRAVEL HIGH RISK QUESTION
ROSEMARY attempted to call Dr. Landeros's office to schedule an appointment. No answer. Unable to leave a voicemail.    No

## 2023-03-21 NOTE — ED ADULT NURSE NOTE - PRO INTERPRETER NEED 2
[FreeTextEntry1] : 41M c history of CAP (2018), prediabetes, lipomas, +covid-19 abx here for cpe and above\par \par ghm - d/w pt bw -prediabetes resolvged -  LDL okay - to continue to watch diet and exercise\par to take vitamin d 2,000 IU daily for low vitamin d \par physical - ekg nsr sinus tachyardia (pt was rushing here in traffic.. repeat HR manual was 78) - kids home sick w/URI + PNA x 10 days \par advised annual optho and dental exam\par \par RTC in 3 months \par \par 
English

## 2023-03-28 NOTE — ED ADULT TRIAGE NOTE - BP NONINVASIVE SYSTOLIC (MM HG)
"Crista Ma's goals for this visit include:   Chief Complaint   Patient presents with     RECHECK     Stress incontinence        She requests these members of her care team be copied on today's visit information: Yes    PCP: [facility]    Referring Provider:  No referring provider defined for this encounter.    Chief Complaint   Patient presents with     RECHECK     Stress incontinence        Initial BP (!) 156/99 (BP Location: Left arm, Patient Position: Sitting, Cuff Size: Adult Regular)  Pulse 98  LMP 12/06/2016 (Exact Date)  SpO2 98% Estimated body mass index is 32.86 kg/(m^2) as calculated from the following:    Height as of 8/30/17: 1.727 m (5' 8\").    Weight as of 8/30/17: 98 kg (216 lb 1.6 oz).  Medication Reconciliation: complete    Do you need any medication refills at today's visit? No    "
140

## 2023-03-28 NOTE — ED PROVIDER NOTE - NSFOLLOWUPCLINICS_GEN_ALL_ED_FT
[facility]  Cardiology  [address], [address]  [place] [address]  Phone: [phone]  Fax:

## 2023-03-28 NOTE — ED PROVIDER NOTE - OBJECTIVE STATEMENT
47y Male BIBEMS s/p NARCAN administration by PD initially found unresponsive with improved respiratory status and mental status after NARCAN. Patient endorses to cocaine use today, initially denies any complaints on ED arrival however during assessment patient complained of sudden onset chest pain and left arm numbness with no shortness of breath of palpitations. Denies fevers, chills, headache, cough, nausea, vomiting.

## 2023-03-28 NOTE — ED ADULT NURSE NOTE - CHIEF COMPLAINT QUOTE
BIBEMS c/o ingestion of illicit substance. Per patient he used cocaine and opioids today. Narcan admin IN by [employer] on scene. Patient denies any medical complaints at triage, RR even and unlabored. EKG in progress.

## 2023-03-28 NOTE — ED ADULT TRIAGE NOTE - INTERNATIONAL TRAVEL
No
Check here if all serologies below were negative, non-reactive or immune. Otherwise select appropriate status.

## 2023-03-28 NOTE — ED PROVIDER NOTE - NSFOLLOWUPINSTRUCTIONS_ED_ALL_ED_FT
- Follow up with your doctor within 2-3 days.   - Follow up with Cardiology, see information below.  - Bring results with you to the appointment.   - Return to the ED for any new or worsening symptoms.     Chest Pain    Chest pain can be caused by many different conditions which may or may not be dangerous. Causes include heartburn, lung infections, heart attack, blood clot in lungs, skin infections, strain or damage to muscle, cartilage, or bones, etc. In addition to a history and physical examination, an electrocardiogram (ECG) or other lab tests may have been performed to determine the cause of your chest pain. Follow up with your primary care provider or with a cardiologist as instructed.     SEEK IMMEDIATE MEDICAL CARE IF YOU HAVE ANY OF THE FOLLOWING SYMPTOMS: worsening chest pain, coughing up blood, unexplained back/neck/jaw pain, severe abdominal pain, dizziness or lightheadedness, fainting, shortness of breath, sweaty or clammy skin, vomiting, or racing heart beat. These symptoms may represent a serious problem that is an emergency. Do not wait to see if the symptoms will go away. Get medical help right away. Call 911 and do not drive yourself to the hospital.

## 2023-03-28 NOTE — ED PROVIDER NOTE - ATTENDING CONTRIBUTION TO CARE
Yuko: I performed a face to face bedside interview with patient regarding history of present illness, review of symptoms and past medical history. I completed an independent physical exam.  I have discussed patient's plan of care with resident.   I agree with note as stated above including HISTORY OF PRESENT ILLNESS, HIV, PAST MEDICAL/SURGICAL/FAMILY/SOCIAL HISTORY, ALLERGIES AND HOME MEDICATIONS, REVIEW OF SYSTEMS, PHYSICAL EXAM, MEDICAL DECISION MAKING and any PROGRESS NOTES during the time I functioned as the attending physician for this patient unless otherwise noted. My brief assessment is as follows: 47y Male presented asymptomatic after waking up from narcan s/p supposed cocaine use. During exam had sudden onset chest pain. Atraumatic, hemodynamically stable, neurovascularly intact. ECG with new TWI compared to prior from 2018. Differential diagnosis includes but not limited to ACS, cocaine induced vasospasm, overdose, electrolyte derangement. Patient ECG remained stable, CP resolved, patient asymptomatic, troponin negative x2, CXR WNL. Requesting discharge, encouraged to abstain from substance use. Will follow with Cardiology. Return precautions given.

## 2023-03-28 NOTE — ED ADULT TRIAGE NOTE - CHIEF COMPLAINT QUOTE
BIBEMS c/o ingestion of illicit substance. Per patient he used cocaine and opioids today. Narcan admin IN by [employer] on scene. Patient denies any medical complaints at triage, RR even and unlabored. BIBEMS c/o ingestion of illicit substance. Per patient he used cocaine and opioids today. Narcan admin IN by [employer] on scene. Patient denies any medical complaints at triage, RR even and unlabored. EKG in progress.

## 2023-03-28 NOTE — ED PROVIDER NOTE - CLINICAL SUMMARY MEDICAL DECISION MAKING FREE TEXT BOX
47y Male with sudden onset chest pain BIBEMS s/p NARCAN administration, initially unresponsive in the setting of reported cocaine ingestion with improvement. Atraumatic, hemodynamically stable, neurovascularly intact. ECG with new changes compared to prior from 2018. Differential diagnosis includes but not limited to ACS, cocaine induced vasospasm, overdose, electrolyte derangement. 47y Male presented asymptomatic after waking up from narcan s/p supposed cocaine use. During exam had sudden onset chest pain. Atraumatic, hemodynamically stable, neurovascularly intact. ECG with new TWI compared to prior from 2018. Differential diagnosis includes but not limited to ACS, cocaine induced vasospasm, overdose, electrolyte derangement. Patient ECG remained stable, CP resolved, patient asymptomatic, troponin negative x2, CXR WNL. Requesting discharge, encouraged to abstain from substance use. Will follow with Cardiology. Return precautions given. Narcan kit given.

## 2023-03-28 NOTE — ED PROVIDER NOTE - WR INTERPRETED BY 1
Problem: Discharge Planning  Goal: Discharge to home or other facility with appropriate resources  Outcome: Progressing  Flowsheets (Taken 1/11/2023 0321)  Discharge to home or other facility with appropriate resources: Identify barriers to discharge with patient and caregiver     Problem: Pain  Goal: Verbalizes/displays adequate comfort level or baseline comfort level  Outcome: Progressing     Problem: Neurosensory - Adult  Goal: Absence of seizures  Outcome: Progressing  Flowsheets (Taken 1/11/2023 0321)  Absence of seizures: Monitor for seizure activity.   If seizure occurs, document type and location of movements and any associated apnea     Problem: Neurosensory - Adult  Goal: Remains free of injury related to seizures activity  Outcome: Progressing  Flowsheets (Taken 1/11/2023 0321)  Remains free of injury related to seizure activity:   Maintain airway, patient safety  and administer oxygen as ordered   Monitor patient for seizure activity, document and report duration and description of seizure to Licensed Independent Practitioner     Problem: Skin/Tissue Integrity - Adult  Goal: Skin integrity remains intact  Outcome: Progressing  Flowsheets (Taken 1/11/2023 0321)  Skin Integrity Remains Intact: Monitor for areas of redness and/or skin breakdown     Problem: Gastrointestinal - Adult  Goal: Minimal or absence of nausea and vomiting  Outcome: Progressing  Flowsheets (Taken 1/11/2023 0321)  Minimal or absence of nausea and vomiting: Administer IV fluids as ordered to ensure adequate hydration     Problem: Gastrointestinal - Adult  Goal: Maintains or returns to baseline bowel function  Outcome: Progressing  Flowsheets (Taken 1/11/2023 0321)  Maintains or returns to baseline bowel function: Assess bowel function     Problem: Gastrointestinal - Adult  Goal: Maintains adequate nutritional intake  Outcome: Progressing  Flowsheets (Taken 1/11/2023 0321)  Maintains adequate nutritional intake: Monitor percentage of each meal consumed     Problem: Metabolic/Fluid and Electrolytes - Adult  Goal: Electrolytes maintained within normal limits  Outcome: Progressing  Flowsheets (Taken 1/11/2023 0321)  Electrolytes maintained within normal limits:   Monitor labs and assess patient for signs and symptoms of electrolyte imbalances   Administer electrolyte replacement as ordered    Care plan reviewed with patient. Patient  verbalize understanding of the plan of care and contribute to goal setting. Mark Huntley

## 2023-03-28 NOTE — ED PROVIDER NOTE - PATIENT PORTAL LINK FT
You can access the FollowMyHealth Patient Portal offered by [facility] by registering at the following website: [email or web address]. By joining [employer]’s FollowMyHealth portal, you will also be able to view your health information using other applications (apps) compatible with our system.

## 2023-03-28 NOTE — ED PROVIDER NOTE - EKG ADDITIONAL INFORMATION FREE TEXT
sinus 93, normal axis and intervals. T-wave inversion II, III, avf, V1, V5-6 which are new compared to prior 2018

## 2023-03-29 NOTE — ED ADULT NURSE REASSESSMENT NOTE - NS ED NURSE REASSESS COMMENT FT1
Pt D/C home in stable condition. accompanied by family member. instruct pt to return to ED with any changes

## 2023-10-07 PROBLEM — Z00.00 ENCOUNTER FOR PREVENTIVE HEALTH EXAMINATION: Status: ACTIVE | Noted: 2023-01-01

## 2023-11-17 NOTE — ED ADULT TRIAGE NOTE - CHIEF COMPLAINT QUOTE
Pt BIBEMS from home after complaining of neck and back pain that started yesterday. As per EMS pt sleep walks and believes pain is from that. Pt is awake and alert but refusing to answer RN questions, pt is responsive to painful and verbal stimuli.

## 2023-11-18 NOTE — ED PROVIDER NOTE - CARE PROVIDER_API CALL
Stephen Tesfaye  Orthopaedic Surgery  [address] [place] [id number]  Phone: [phone]  Fax: [phone]  Follow Up Time:

## 2023-11-18 NOTE — ED PROVIDER NOTE - OBJECTIVE STATEMENT
46 yo male with no pmhx presents with back pain x 2days. States that he fell out of his bed 2 days ago and has been having pain in the neck and back pain since. Denies LOC. Denies taking anything for pain. States the lower back pain radiates down the legs. Has been able to ambulate nl. Denies fever, dizziness, LOC, CP, palpitations, sob, abd pain, n/v, saddle paresthesias, paresthesias in the extremities, urinary/bowel incontinence, rashes. Denies IVDA, etoh use.

## 2023-11-18 NOTE — ED PROVIDER NOTE - ATTENDING APP SHARED VISIT CONTRIBUTION OF CARE
back pain, denies weakness or incontinence; moving all ext, no apparent deficits; requesting to sleep; agree with acp plan of care    This was a shared visit with DIMITRY. I reviewed and verified the documentation and independently performed the documented history/exam/mdm.

## 2023-11-18 NOTE — ED PROVIDER NOTE - NSFOLLOWUPINSTRUCTIONS_ED_ALL_ED_FT
- Please follow-up with your primary care doctor in the next 1-2 days.  Please call tomorrow for an appointment.  If you cannot follow-up with your primary care doctor please return to the ED for any urgent issues.  - You were given a copy of the tests performed today.  Please bring the results with you and review them with your primary care doctor.  - If you have any worsening of symptoms or any other concerns please return to the ED immediately.  - Please continue taking your home medications as directed.     Back Pain    Back pain is very common in adults. The cause of back pain is rarely dangerous and the pain often gets better over time. The cause of your back pain may not be known and may include strain of muscles or ligaments, degeneration of the spinal disks, or arthritis. Occasionally the pain may radiate down your leg(s). Over-the-counter medicines to reduce pain and inflammation are often the most helpful. Stretching and remaining active frequently helps the healing process.     SEEK IMMEDIATE MEDICAL CARE IF YOU HAVE ANY OF THE FOLLOWING SYMPTOMS: bowel or bladder control problems, unusual weakness or numbness in your arms or legs, nausea or vomiting, abdominal pain, fever, dizziness/lightheadedness.

## 2023-11-18 NOTE — ED PROVIDER NOTE - CLINICAL SUMMARY MEDICAL DECISION MAKING FREE TEXT BOX
48 yo male with no pmhx presents with back pain x 2days. neurovascularly intact, no fnd's. strict return precautions explained. 48 yo male with no pmhx presents with back pain x 2days. neurovascularly intact, no fnd's. strict return precautions explained.  neurovascularly intact, no fnd's

## 2023-11-18 NOTE — ED PROVIDER NOTE - PHYSICAL EXAMINATION
Gen: No acute distress, non toxic  HEENT: Mucous membranes moist, pink conjunctivae, EOMI  CV: RRR, nl s1/s2.  Resp: CTAB, normal rate and effort  GI: Abdomen soft, NT, ND. No rebound, no guarding. no ecchymosis.   Neuro: A&O x4, MAEx4. 5/5 str ext x 4. Sensation intact, symmetric throughout. No fnd's, cerebellar fxn intact.   MSK: No midline spinal. +ttp to the SCM and lower paraspinal lumbar area. FROM UE and LE b/l. compartments soft/compressible. fwb and ambulating   Skin: No rashes. intact and perfused.

## 2023-11-18 NOTE — ED PROVIDER NOTE - PATIENT PORTAL LINK FT
You can access the FollowMyHealth Patient Portal offered by [facility] by registering at the following website: [email or web address] By joining [email or web address]’s FollowMyHealth portal, you will also be able to view your health information using other applications (apps) compatible with our system.

## 2023-11-18 NOTE — ED ADULT NURSE NOTE - NSFALLUNIVINTERV_ED_ALL_ED
Bed/Stretcher in lowest position, wheels locked, appropriate side rails in place/Call bell, personal items and telephone in reach/Instruct patient to call for assistance before getting out of bed/chair/stretcher/Non-slip footwear applied when patient is off stretcher/[place] to call system/Physically safe environment - no spills, clutter or unnecessary equipment/Purposeful proactive rounding/Room/bathroom lighting operational, light cord in reach

## 2024-01-01 ENCOUNTER — INPATIENT (INPATIENT)
Facility: HOSPITAL | Age: 49
LOS: 2 days | DRG: 871 | End: 2024-03-07
Attending: INTERNAL MEDICINE | Admitting: INTERNAL MEDICINE
Payer: MEDICAID

## 2024-01-01 ENCOUNTER — RESULT REVIEW (OUTPATIENT)
Age: 49
End: 2024-01-01

## 2024-01-01 VITALS — TEMPERATURE: 101 F | HEART RATE: 109 BPM | RESPIRATION RATE: 18 BRPM | OXYGEN SATURATION: 62 %

## 2024-01-01 VITALS — HEART RATE: 98 BPM | OXYGEN SATURATION: 108 %

## 2024-01-01 DIAGNOSIS — Z86.79 PERSONAL HISTORY OF OTHER DISEASES OF THE CIRCULATORY SYSTEM: Chronic | ICD-10-CM

## 2024-01-01 DIAGNOSIS — I33.0 ACUTE AND SUBACUTE INFECTIVE ENDOCARDITIS: ICD-10-CM

## 2024-01-01 DIAGNOSIS — S72.90XA UNSPECIFIED FRACTURE OF UNSPECIFIED FEMUR, INITIAL ENCOUNTER FOR CLOSED FRACTURE: Chronic | ICD-10-CM

## 2024-01-01 DIAGNOSIS — I33.9 ACUTE AND SUBACUTE ENDOCARDITIS, UNSPECIFIED: ICD-10-CM

## 2024-01-01 LAB
-  AMPICILLIN: SIGNIFICANT CHANGE UP
-  AMPICILLIN: SIGNIFICANT CHANGE UP
-  GENTAMICIN SYNERGY: SIGNIFICANT CHANGE UP
-  GENTAMICIN SYNERGY: SIGNIFICANT CHANGE UP
-  STAPHYLOCOCCUS EPIDERMIDIS, METHICILLIN RESISTANT: SIGNIFICANT CHANGE UP
-  STREPTOMYCIN SYNERGY: SIGNIFICANT CHANGE UP
-  STREPTOMYCIN SYNERGY: SIGNIFICANT CHANGE UP
-  VANCOMYCIN: SIGNIFICANT CHANGE UP
-  VANCOMYCIN: SIGNIFICANT CHANGE UP
A1C WITH ESTIMATED AVERAGE GLUCOSE RESULT: 5.9 % — HIGH (ref 4–5.6)
ALBUMIN SERPL ELPH-MCNC: 1.5 G/DL — LOW (ref 3.3–5.2)
ALBUMIN SERPL ELPH-MCNC: 2 G/DL — LOW (ref 3.3–5.2)
ALBUMIN SERPL ELPH-MCNC: 2.1 G/DL — LOW (ref 3.3–5.2)
ALBUMIN SERPL ELPH-MCNC: 2.2 G/DL — LOW (ref 3.3–5.2)
ALBUMIN SERPL ELPH-MCNC: 2.3 G/DL — LOW (ref 3.3–5.2)
ALBUMIN SERPL ELPH-MCNC: 2.3 G/DL — LOW (ref 3.3–5.2)
ALP SERPL-CCNC: 141 U/L — HIGH (ref 40–120)
ALP SERPL-CCNC: 149 U/L — HIGH (ref 40–120)
ALP SERPL-CCNC: 165 U/L — HIGH (ref 40–120)
ALP SERPL-CCNC: 166 U/L — HIGH (ref 40–120)
ALP SERPL-CCNC: 201 U/L — HIGH (ref 40–120)
ALP SERPL-CCNC: 241 U/L — HIGH (ref 40–120)
ALP SERPL-CCNC: 242 U/L — HIGH (ref 40–120)
ALP SERPL-CCNC: 244 U/L — HIGH (ref 40–120)
ALT FLD-CCNC: 1082 U/L — HIGH
ALT FLD-CCNC: 1378 U/L — HIGH
ALT FLD-CCNC: 2378 U/L — HIGH
ALT FLD-CCNC: 2595 U/L — HIGH
ALT FLD-CCNC: 2657 U/L — HIGH
ALT FLD-CCNC: 733 U/L — HIGH
ALT FLD-CCNC: 914 U/L — HIGH
ALT FLD-CCNC: 953 U/L — HIGH
AMMONIA BLD-MCNC: 43 UMOL/L — SIGNIFICANT CHANGE UP (ref 11–55)
ANION GAP SERPL CALC-SCNC: 10 MMOL/L — SIGNIFICANT CHANGE UP (ref 5–17)
ANION GAP SERPL CALC-SCNC: 11 MMOL/L — SIGNIFICANT CHANGE UP (ref 5–17)
ANION GAP SERPL CALC-SCNC: 11 MMOL/L — SIGNIFICANT CHANGE UP (ref 5–17)
ANION GAP SERPL CALC-SCNC: 12 MMOL/L — SIGNIFICANT CHANGE UP (ref 5–17)
ANION GAP SERPL CALC-SCNC: 14 MMOL/L — SIGNIFICANT CHANGE UP (ref 5–17)
ANION GAP SERPL CALC-SCNC: 15 MMOL/L — SIGNIFICANT CHANGE UP (ref 5–17)
ANION GAP SERPL CALC-SCNC: 15 MMOL/L — SIGNIFICANT CHANGE UP (ref 5–17)
ANION GAP SERPL CALC-SCNC: 16 MMOL/L — SIGNIFICANT CHANGE UP (ref 5–17)
ANION GAP SERPL CALC-SCNC: 17 MMOL/L — SIGNIFICANT CHANGE UP (ref 5–17)
ANION GAP SERPL CALC-SCNC: 19 MMOL/L — HIGH (ref 5–17)
ANION GAP SERPL CALC-SCNC: 20 MMOL/L — HIGH (ref 5–17)
ANISOCYTOSIS BLD QL: SIGNIFICANT CHANGE UP
ANISOCYTOSIS BLD QL: SLIGHT — SIGNIFICANT CHANGE UP
APPEARANCE UR: ABNORMAL
APTT BLD: 22.6 SEC — LOW (ref 24.5–35.6)
APTT BLD: 27.2 SEC — SIGNIFICANT CHANGE UP (ref 24.5–35.6)
APTT BLD: 28 SEC — SIGNIFICANT CHANGE UP (ref 24.5–35.6)
AST SERPL-CCNC: 425 U/L — HIGH
AST SERPL-CCNC: 432 U/L — HIGH
AST SERPL-CCNC: 4360 U/L — HIGH
AST SERPL-CCNC: 466 U/L — HIGH
AST SERPL-CCNC: 5740 U/L — HIGH
AST SERPL-CCNC: 794 U/L — HIGH
AST SERPL-CCNC: 996 U/L — HIGH
AST SERPL-CCNC: >7000 U/L — HIGH
BACTERIA # UR AUTO: ABNORMAL /HPF
BASE EXCESS BLDV CALC-SCNC: 2.5 MMOL/L — SIGNIFICANT CHANGE UP (ref -2–3)
BASE EXCESS BLDV CALC-SCNC: 6.1 MMOL/L — HIGH (ref -2–3)
BASOPHILS # BLD AUTO: 0 K/UL — SIGNIFICANT CHANGE UP (ref 0–0.2)
BASOPHILS # BLD AUTO: 0 K/UL — SIGNIFICANT CHANGE UP (ref 0–0.2)
BASOPHILS # BLD AUTO: 0.04 K/UL — SIGNIFICANT CHANGE UP (ref 0–0.2)
BASOPHILS # BLD AUTO: 0.06 K/UL — SIGNIFICANT CHANGE UP (ref 0–0.2)
BASOPHILS # BLD AUTO: 0.06 K/UL — SIGNIFICANT CHANGE UP (ref 0–0.2)
BASOPHILS # BLD AUTO: 0.1 K/UL — SIGNIFICANT CHANGE UP (ref 0–0.2)
BASOPHILS # BLD AUTO: 0.13 K/UL — SIGNIFICANT CHANGE UP (ref 0–0.2)
BASOPHILS NFR BLD AUTO: 0 % — SIGNIFICANT CHANGE UP (ref 0–2)
BASOPHILS NFR BLD AUTO: 0 % — SIGNIFICANT CHANGE UP (ref 0–2)
BASOPHILS NFR BLD AUTO: 0.2 % — SIGNIFICANT CHANGE UP (ref 0–2)
BASOPHILS NFR BLD AUTO: 0.2 % — SIGNIFICANT CHANGE UP (ref 0–2)
BASOPHILS NFR BLD AUTO: 0.3 % — SIGNIFICANT CHANGE UP (ref 0–2)
BASOPHILS NFR BLD AUTO: 0.3 % — SIGNIFICANT CHANGE UP (ref 0–2)
BASOPHILS NFR BLD AUTO: 0.4 % — SIGNIFICANT CHANGE UP (ref 0–2)
BILIRUB DIRECT SERPL-MCNC: 0.5 MG/DL — HIGH (ref 0–0.3)
BILIRUB INDIRECT FLD-MCNC: 0.3 MG/DL — SIGNIFICANT CHANGE UP (ref 0.2–1)
BILIRUB SERPL-MCNC: 0.5 MG/DL — SIGNIFICANT CHANGE UP (ref 0.4–2)
BILIRUB SERPL-MCNC: 0.7 MG/DL — SIGNIFICANT CHANGE UP (ref 0.4–2)
BILIRUB SERPL-MCNC: 0.7 MG/DL — SIGNIFICANT CHANGE UP (ref 0.4–2)
BILIRUB SERPL-MCNC: 0.8 MG/DL — SIGNIFICANT CHANGE UP (ref 0.4–2)
BILIRUB SERPL-MCNC: 1 MG/DL — SIGNIFICANT CHANGE UP (ref 0.4–2)
BILIRUB SERPL-MCNC: 1.1 MG/DL — SIGNIFICANT CHANGE UP (ref 0.4–2)
BILIRUB UR-MCNC: NEGATIVE — SIGNIFICANT CHANGE UP
BLD GP AB SCN SERPL QL: SIGNIFICANT CHANGE UP
BLOOD GAS COMMENTS, VENOUS: SIGNIFICANT CHANGE UP
BLOOD GAS COMMENTS, VENOUS: SIGNIFICANT CHANGE UP
BUN SERPL-MCNC: 38.2 MG/DL — HIGH (ref 8–20)
BUN SERPL-MCNC: 39.4 MG/DL — HIGH (ref 8–20)
BUN SERPL-MCNC: 40.3 MG/DL — HIGH (ref 8–20)
BUN SERPL-MCNC: 45.6 MG/DL — HIGH (ref 8–20)
BUN SERPL-MCNC: 47.8 MG/DL — HIGH (ref 8–20)
BUN SERPL-MCNC: 48.2 MG/DL — HIGH (ref 8–20)
BUN SERPL-MCNC: 52.8 MG/DL — HIGH (ref 8–20)
BUN SERPL-MCNC: 52.9 MG/DL — HIGH (ref 8–20)
BUN SERPL-MCNC: 55.8 MG/DL — HIGH (ref 8–20)
BUN SERPL-MCNC: 57.2 MG/DL — HIGH (ref 8–20)
BUN SERPL-MCNC: 58.9 MG/DL — HIGH (ref 8–20)
BURR CELLS BLD QL SMEAR: PRESENT — SIGNIFICANT CHANGE UP
BURR CELLS BLD QL SMEAR: PRESENT — SIGNIFICANT CHANGE UP
CA-I SERPL-SCNC: 0.94 MMOL/L — LOW (ref 1.15–1.33)
CA-I SERPL-SCNC: 0.94 MMOL/L — LOW (ref 1.15–1.33)
CALCIUM SERPL-MCNC: 11.8 MG/DL — HIGH (ref 8.4–10.5)
CALCIUM SERPL-MCNC: 6.7 MG/DL — LOW (ref 8.4–10.5)
CALCIUM SERPL-MCNC: 6.8 MG/DL — LOW (ref 8.4–10.5)
CALCIUM SERPL-MCNC: 6.9 MG/DL — LOW (ref 8.4–10.5)
CALCIUM SERPL-MCNC: 6.9 MG/DL — LOW (ref 8.4–10.5)
CALCIUM SERPL-MCNC: 7 MG/DL — LOW (ref 8.4–10.5)
CALCIUM SERPL-MCNC: 7.1 MG/DL — LOW (ref 8.4–10.5)
CALCIUM SERPL-MCNC: 7.1 MG/DL — LOW (ref 8.4–10.5)
CALCIUM SERPL-MCNC: 7.2 MG/DL — LOW (ref 8.4–10.5)
CALCIUM SERPL-MCNC: 7.2 MG/DL — LOW (ref 8.4–10.5)
CALCIUM SERPL-MCNC: 7.3 MG/DL — LOW (ref 8.4–10.5)
CHLORIDE BLDV-SCNC: 89 MMOL/L — LOW (ref 96–108)
CHLORIDE BLDV-SCNC: 89 MMOL/L — LOW (ref 96–108)
CHLORIDE SERPL-SCNC: 100 MMOL/L — SIGNIFICANT CHANGE UP (ref 96–108)
CHLORIDE SERPL-SCNC: 101 MMOL/L — SIGNIFICANT CHANGE UP (ref 96–108)
CHLORIDE SERPL-SCNC: 101 MMOL/L — SIGNIFICANT CHANGE UP (ref 96–108)
CHLORIDE SERPL-SCNC: 85 MMOL/L — LOW (ref 96–108)
CHLORIDE SERPL-SCNC: 86 MMOL/L — LOW (ref 96–108)
CHLORIDE SERPL-SCNC: 86 MMOL/L — LOW (ref 96–108)
CHLORIDE SERPL-SCNC: 87 MMOL/L — LOW (ref 96–108)
CHLORIDE SERPL-SCNC: 90 MMOL/L — LOW (ref 96–108)
CHLORIDE SERPL-SCNC: 97 MMOL/L — SIGNIFICANT CHANGE UP (ref 96–108)
CHLORIDE SERPL-SCNC: 97 MMOL/L — SIGNIFICANT CHANGE UP (ref 96–108)
CHLORIDE SERPL-SCNC: 99 MMOL/L — SIGNIFICANT CHANGE UP (ref 96–108)
CHOLEST SERPL-MCNC: 75 MG/DL — SIGNIFICANT CHANGE UP
CO2 SERPL-SCNC: 22 MMOL/L — SIGNIFICANT CHANGE UP (ref 22–29)
CO2 SERPL-SCNC: 22 MMOL/L — SIGNIFICANT CHANGE UP (ref 22–29)
CO2 SERPL-SCNC: 26 MMOL/L — SIGNIFICANT CHANGE UP (ref 22–29)
CO2 SERPL-SCNC: 27 MMOL/L — SIGNIFICANT CHANGE UP (ref 22–29)
CO2 SERPL-SCNC: 28 MMOL/L — SIGNIFICANT CHANGE UP (ref 22–29)
CO2 SERPL-SCNC: 29 MMOL/L — SIGNIFICANT CHANGE UP (ref 22–29)
CO2 SERPL-SCNC: 30 MMOL/L — HIGH (ref 22–29)
CO2 SERPL-SCNC: 31 MMOL/L — HIGH (ref 22–29)
CO2 SERPL-SCNC: 31 MMOL/L — HIGH (ref 22–29)
COD CRY URNS QL: 2 — SIGNIFICANT CHANGE UP
COLOR SPEC: SIGNIFICANT CHANGE UP
CREAT SERPL-MCNC: 1.33 MG/DL — HIGH (ref 0.5–1.3)
CREAT SERPL-MCNC: 1.34 MG/DL — HIGH (ref 0.5–1.3)
CREAT SERPL-MCNC: 1.34 MG/DL — HIGH (ref 0.5–1.3)
CREAT SERPL-MCNC: 1.36 MG/DL — HIGH (ref 0.5–1.3)
CREAT SERPL-MCNC: 1.37 MG/DL — HIGH (ref 0.5–1.3)
CREAT SERPL-MCNC: 1.39 MG/DL — HIGH (ref 0.5–1.3)
CREAT SERPL-MCNC: 1.41 MG/DL — HIGH (ref 0.5–1.3)
CREAT SERPL-MCNC: 1.48 MG/DL — HIGH (ref 0.5–1.3)
CREAT SERPL-MCNC: 1.74 MG/DL — HIGH (ref 0.5–1.3)
CREAT SERPL-MCNC: 1.82 MG/DL — HIGH (ref 0.5–1.3)
CREAT SERPL-MCNC: 2.01 MG/DL — HIGH (ref 0.5–1.3)
CULTURE RESULTS: ABNORMAL
CULTURE RESULTS: NO GROWTH — SIGNIFICANT CHANGE UP
DIFF PNL FLD: ABNORMAL
E FAECALIS DNA BLD POS QL NAA+NON-PROBE: SIGNIFICANT CHANGE UP
E FAECALIS DNA BLD POS QL NAA+NON-PROBE: SIGNIFICANT CHANGE UP
EGFR: 40 ML/MIN/1.73M2 — LOW
EGFR: 45 ML/MIN/1.73M2 — LOW
EGFR: 47 ML/MIN/1.73M2 — LOW
EGFR: 58 ML/MIN/1.73M2 — LOW
EGFR: 61 ML/MIN/1.73M2 — SIGNIFICANT CHANGE UP
EGFR: 62 ML/MIN/1.73M2 — SIGNIFICANT CHANGE UP
EGFR: 63 ML/MIN/1.73M2 — SIGNIFICANT CHANGE UP
EGFR: 64 ML/MIN/1.73M2 — SIGNIFICANT CHANGE UP
EGFR: 65 ML/MIN/1.73M2 — SIGNIFICANT CHANGE UP
EGFR: 65 ML/MIN/1.73M2 — SIGNIFICANT CHANGE UP
EGFR: 66 ML/MIN/1.73M2 — SIGNIFICANT CHANGE UP
EOSINOPHIL # BLD AUTO: 0 K/UL — SIGNIFICANT CHANGE UP (ref 0–0.5)
EOSINOPHIL # BLD AUTO: 0.02 K/UL — SIGNIFICANT CHANGE UP (ref 0–0.5)
EOSINOPHIL # BLD AUTO: 0.03 K/UL — SIGNIFICANT CHANGE UP (ref 0–0.5)
EOSINOPHIL NFR BLD AUTO: 0 % — SIGNIFICANT CHANGE UP (ref 0–6)
EOSINOPHIL NFR BLD AUTO: 0.1 % — SIGNIFICANT CHANGE UP (ref 0–6)
EOSINOPHIL NFR BLD AUTO: 0.1 % — SIGNIFICANT CHANGE UP (ref 0–6)
ESTIMATED AVERAGE GLUCOSE: 123 MG/DL — HIGH (ref 68–114)
FIBRINOGEN PPP-MCNC: 355 MG/DL — SIGNIFICANT CHANGE UP (ref 200–450)
GAS PNL BLDA: SIGNIFICANT CHANGE UP
GAS PNL BLDV: 122 MMOL/L — LOW (ref 136–145)
GAS PNL BLDV: 124 MMOL/L — LOW (ref 136–145)
GAS PNL BLDV: SIGNIFICANT CHANGE UP
GIANT PLATELETS BLD QL SMEAR: PRESENT — SIGNIFICANT CHANGE UP
GLUCOSE BLDC GLUCOMTR-MCNC: 108 MG/DL — HIGH (ref 70–99)
GLUCOSE BLDC GLUCOMTR-MCNC: 121 MG/DL — HIGH (ref 70–99)
GLUCOSE BLDC GLUCOMTR-MCNC: 121 MG/DL — HIGH (ref 70–99)
GLUCOSE BLDC GLUCOMTR-MCNC: 122 MG/DL — HIGH (ref 70–99)
GLUCOSE BLDC GLUCOMTR-MCNC: 133 MG/DL — HIGH (ref 70–99)
GLUCOSE BLDC GLUCOMTR-MCNC: 135 MG/DL — HIGH (ref 70–99)
GLUCOSE BLDC GLUCOMTR-MCNC: 142 MG/DL — HIGH (ref 70–99)
GLUCOSE BLDC GLUCOMTR-MCNC: 150 MG/DL — HIGH (ref 70–99)
GLUCOSE BLDC GLUCOMTR-MCNC: 153 MG/DL — HIGH (ref 70–99)
GLUCOSE BLDC GLUCOMTR-MCNC: 157 MG/DL — HIGH (ref 70–99)
GLUCOSE BLDC GLUCOMTR-MCNC: 160 MG/DL — HIGH (ref 70–99)
GLUCOSE BLDC GLUCOMTR-MCNC: 160 MG/DL — HIGH (ref 70–99)
GLUCOSE BLDC GLUCOMTR-MCNC: 162 MG/DL — HIGH (ref 70–99)
GLUCOSE BLDC GLUCOMTR-MCNC: 164 MG/DL — HIGH (ref 70–99)
GLUCOSE BLDC GLUCOMTR-MCNC: 172 MG/DL — HIGH (ref 70–99)
GLUCOSE BLDC GLUCOMTR-MCNC: 180 MG/DL — HIGH (ref 70–99)
GLUCOSE BLDC GLUCOMTR-MCNC: 187 MG/DL — HIGH (ref 70–99)
GLUCOSE BLDC GLUCOMTR-MCNC: 194 MG/DL — HIGH (ref 70–99)
GLUCOSE BLDC GLUCOMTR-MCNC: 196 MG/DL — HIGH (ref 70–99)
GLUCOSE BLDC GLUCOMTR-MCNC: 199 MG/DL — HIGH (ref 70–99)
GLUCOSE BLDC GLUCOMTR-MCNC: 226 MG/DL — HIGH (ref 70–99)
GLUCOSE BLDC GLUCOMTR-MCNC: 255 MG/DL — HIGH (ref 70–99)
GLUCOSE BLDC GLUCOMTR-MCNC: 285 MG/DL — HIGH (ref 70–99)
GLUCOSE BLDC GLUCOMTR-MCNC: 323 MG/DL — HIGH (ref 70–99)
GLUCOSE BLDC GLUCOMTR-MCNC: 336 MG/DL — HIGH (ref 70–99)
GLUCOSE BLDC GLUCOMTR-MCNC: 350 MG/DL — HIGH (ref 70–99)
GLUCOSE BLDC GLUCOMTR-MCNC: 405 MG/DL — HIGH (ref 70–99)
GLUCOSE BLDC GLUCOMTR-MCNC: 408 MG/DL — HIGH (ref 70–99)
GLUCOSE BLDC GLUCOMTR-MCNC: 432 MG/DL — HIGH (ref 70–99)
GLUCOSE BLDC GLUCOMTR-MCNC: 471 MG/DL — CRITICAL HIGH (ref 70–99)
GLUCOSE BLDC GLUCOMTR-MCNC: 486 MG/DL — CRITICAL HIGH (ref 70–99)
GLUCOSE BLDC GLUCOMTR-MCNC: 486 MG/DL — CRITICAL HIGH (ref 70–99)
GLUCOSE BLDC GLUCOMTR-MCNC: 72 MG/DL — SIGNIFICANT CHANGE UP (ref 70–99)
GLUCOSE BLDC GLUCOMTR-MCNC: 97 MG/DL — SIGNIFICANT CHANGE UP (ref 70–99)
GLUCOSE BLDV-MCNC: 132 MG/DL — HIGH (ref 70–99)
GLUCOSE BLDV-MCNC: 184 MG/DL — HIGH (ref 70–99)
GLUCOSE SERPL-MCNC: 1061 MG/DL — CRITICAL HIGH (ref 70–99)
GLUCOSE SERPL-MCNC: 127 MG/DL — HIGH (ref 70–99)
GLUCOSE SERPL-MCNC: 136 MG/DL — HIGH (ref 70–99)
GLUCOSE SERPL-MCNC: 153 MG/DL — HIGH (ref 70–99)
GLUCOSE SERPL-MCNC: 169 MG/DL — HIGH (ref 70–99)
GLUCOSE SERPL-MCNC: 187 MG/DL — HIGH (ref 70–99)
GLUCOSE SERPL-MCNC: 194 MG/DL — HIGH (ref 70–99)
GLUCOSE SERPL-MCNC: 210 MG/DL — HIGH (ref 70–99)
GLUCOSE SERPL-MCNC: 312 MG/DL — HIGH (ref 70–99)
GLUCOSE SERPL-MCNC: 400 MG/DL — HIGH (ref 70–99)
GLUCOSE SERPL-MCNC: 455 MG/DL — CRITICAL HIGH (ref 70–99)
GLUCOSE UR QL: NEGATIVE MG/DL — SIGNIFICANT CHANGE UP
GRAM STN FLD: ABNORMAL
HAPTOGLOB SERPL-MCNC: <20 MG/DL — LOW (ref 34–200)
HCO3 BLDV-SCNC: 26 MMOL/L — SIGNIFICANT CHANGE UP (ref 22–29)
HCO3 BLDV-SCNC: 30 MMOL/L — HIGH (ref 22–29)
HCT VFR BLD CALC: 24 % — LOW (ref 39–50)
HCT VFR BLD CALC: 25.4 % — LOW (ref 39–50)
HCT VFR BLD CALC: 26.9 % — LOW (ref 39–50)
HCT VFR BLD CALC: 27.3 % — LOW (ref 39–50)
HCT VFR BLD CALC: 27.6 % — LOW (ref 39–50)
HCT VFR BLD CALC: 27.8 % — LOW (ref 39–50)
HCT VFR BLD CALC: 29.3 % — LOW (ref 39–50)
HCT VFR BLDA CALC: 28 % — SIGNIFICANT CHANGE UP
HCT VFR BLDA CALC: 28 % — SIGNIFICANT CHANGE UP
HDLC SERPL-MCNC: 14 MG/DL — LOW
HGB BLD CALC-MCNC: 9.2 G/DL — LOW (ref 12.6–17.4)
HGB BLD CALC-MCNC: 9.3 G/DL — LOW (ref 12.6–17.4)
HGB BLD-MCNC: 6.7 G/DL — CRITICAL LOW (ref 13–17)
HGB BLD-MCNC: 8.3 G/DL — LOW (ref 13–17)
HGB BLD-MCNC: 8.6 G/DL — LOW (ref 13–17)
HGB BLD-MCNC: 8.7 G/DL — LOW (ref 13–17)
HGB BLD-MCNC: 9.1 G/DL — LOW (ref 13–17)
HGB BLD-MCNC: 9.2 G/DL — LOW (ref 13–17)
HGB BLD-MCNC: 9.3 G/DL — LOW (ref 13–17)
HOROWITZ INDEX BLDV+IHG-RTO: 0.3 — SIGNIFICANT CHANGE UP
HOROWITZ INDEX BLDV+IHG-RTO: 0.3 — SIGNIFICANT CHANGE UP
IMM GRANULOCYTES NFR BLD AUTO: 1.2 % — HIGH (ref 0–0.9)
IMM GRANULOCYTES NFR BLD AUTO: 1.2 % — HIGH (ref 0–0.9)
IMM GRANULOCYTES NFR BLD AUTO: 2.3 % — HIGH (ref 0–0.9)
IMM GRANULOCYTES NFR BLD AUTO: 4.1 % — HIGH (ref 0–0.9)
IMM GRANULOCYTES NFR BLD AUTO: 5.8 % — HIGH (ref 0–0.9)
INR BLD: 1.5 RATIO — HIGH (ref 0.85–1.18)
INR BLD: 2.93 RATIO — HIGH (ref 0.85–1.18)
INR BLD: 3 RATIO — HIGH (ref 0.85–1.18)
KETONES UR-MCNC: NEGATIVE MG/DL — SIGNIFICANT CHANGE UP
LACTATE BLDV-MCNC: 3.4 MMOL/L — HIGH (ref 0.5–2)
LACTATE BLDV-MCNC: 4.8 MMOL/L — CRITICAL HIGH (ref 0.5–2)
LACTATE SERPL-SCNC: 4.1 MMOL/L — CRITICAL HIGH (ref 0.5–2)
LACTATE SERPL-SCNC: 4.6 MMOL/L — CRITICAL HIGH (ref 0.5–2)
LDH SERPL L TO P-CCNC: >2332 U/L — HIGH (ref 98–192)
LEGIONELLA AG UR QL: NEGATIVE — SIGNIFICANT CHANGE UP
LEUKOCYTE ESTERASE UR-ACNC: ABNORMAL
LIPID PNL WITH DIRECT LDL SERPL: 24 MG/DL — SIGNIFICANT CHANGE UP
LYMPHOCYTES # BLD AUTO: 1.05 K/UL — SIGNIFICANT CHANGE UP (ref 1–3.3)
LYMPHOCYTES # BLD AUTO: 1.68 K/UL — SIGNIFICANT CHANGE UP (ref 1–3.3)
LYMPHOCYTES # BLD AUTO: 1.69 K/UL — SIGNIFICANT CHANGE UP (ref 1–3.3)
LYMPHOCYTES # BLD AUTO: 1.78 K/UL — SIGNIFICANT CHANGE UP (ref 1–3.3)
LYMPHOCYTES # BLD AUTO: 1.8 K/UL — SIGNIFICANT CHANGE UP (ref 1–3.3)
LYMPHOCYTES # BLD AUTO: 2.46 K/UL — SIGNIFICANT CHANGE UP (ref 1–3.3)
LYMPHOCYTES # BLD AUTO: 2.7 K/UL — SIGNIFICANT CHANGE UP (ref 1–3.3)
LYMPHOCYTES # BLD AUTO: 4.4 % — LOW (ref 13–44)
LYMPHOCYTES # BLD AUTO: 4.6 % — LOW (ref 13–44)
LYMPHOCYTES # BLD AUTO: 6.3 % — LOW (ref 13–44)
LYMPHOCYTES # BLD AUTO: 6.7 % — LOW (ref 13–44)
LYMPHOCYTES # BLD AUTO: 6.9 % — LOW (ref 13–44)
LYMPHOCYTES # BLD AUTO: 8.6 % — LOW (ref 13–44)
LYMPHOCYTES # BLD AUTO: 9.1 % — LOW (ref 13–44)
MACROCYTES BLD QL: SIGNIFICANT CHANGE UP
MACROCYTES BLD QL: SLIGHT — SIGNIFICANT CHANGE UP
MAGNESIUM SERPL-MCNC: 2 MG/DL — SIGNIFICANT CHANGE UP (ref 1.6–2.6)
MAGNESIUM SERPL-MCNC: 2.1 MG/DL — SIGNIFICANT CHANGE UP (ref 1.6–2.6)
MAGNESIUM SERPL-MCNC: 2.2 MG/DL — SIGNIFICANT CHANGE UP (ref 1.6–2.6)
MAGNESIUM SERPL-MCNC: 2.2 MG/DL — SIGNIFICANT CHANGE UP (ref 1.6–2.6)
MAGNESIUM SERPL-MCNC: 2.3 MG/DL — SIGNIFICANT CHANGE UP (ref 1.6–2.6)
MAGNESIUM SERPL-MCNC: 2.3 MG/DL — SIGNIFICANT CHANGE UP (ref 1.6–2.6)
MAGNESIUM SERPL-MCNC: 2.4 MG/DL — SIGNIFICANT CHANGE UP (ref 1.6–2.6)
MAGNESIUM SERPL-MCNC: 2.4 MG/DL — SIGNIFICANT CHANGE UP (ref 1.6–2.6)
MAGNESIUM SERPL-MCNC: 2.5 MG/DL — SIGNIFICANT CHANGE UP (ref 1.6–2.6)
MAGNESIUM SERPL-MCNC: 2.5 MG/DL — SIGNIFICANT CHANGE UP (ref 1.6–2.6)
MAGNESIUM SERPL-MCNC: 2.7 MG/DL — HIGH (ref 1.6–2.6)
MANUAL SMEAR VERIFICATION: SIGNIFICANT CHANGE UP
MANUAL SMEAR VERIFICATION: SIGNIFICANT CHANGE UP
MCHC RBC-ENTMCNC: 24.8 PG — LOW (ref 27–34)
MCHC RBC-ENTMCNC: 25 PG — LOW (ref 27–34)
MCHC RBC-ENTMCNC: 25.2 PG — LOW (ref 27–34)
MCHC RBC-ENTMCNC: 25.4 PG — LOW (ref 27–34)
MCHC RBC-ENTMCNC: 25.5 PG — LOW (ref 27–34)
MCHC RBC-ENTMCNC: 25.5 PG — LOW (ref 27–34)
MCHC RBC-ENTMCNC: 25.6 PG — LOW (ref 27–34)
MCHC RBC-ENTMCNC: 27.9 GM/DL — LOW (ref 32–36)
MCHC RBC-ENTMCNC: 29.4 GM/DL — LOW (ref 32–36)
MCHC RBC-ENTMCNC: 31.3 GM/DL — LOW (ref 32–36)
MCHC RBC-ENTMCNC: 32.7 GM/DL — SIGNIFICANT CHANGE UP (ref 32–36)
MCHC RBC-ENTMCNC: 33.7 GM/DL — SIGNIFICANT CHANGE UP (ref 32–36)
MCHC RBC-ENTMCNC: 33.7 GM/DL — SIGNIFICANT CHANGE UP (ref 32–36)
MCHC RBC-ENTMCNC: 33.8 GM/DL — SIGNIFICANT CHANGE UP (ref 32–36)
MCV RBC AUTO: 75.4 FL — LOW (ref 80–100)
MCV RBC AUTO: 75.6 FL — LOW (ref 80–100)
MCV RBC AUTO: 75.8 FL — LOW (ref 80–100)
MCV RBC AUTO: 76 FL — LOW (ref 80–100)
MCV RBC AUTO: 81 FL — SIGNIFICANT CHANGE UP (ref 80–100)
MCV RBC AUTO: 85.9 FL — SIGNIFICANT CHANGE UP (ref 80–100)
MCV RBC AUTO: 89.6 FL — SIGNIFICANT CHANGE UP (ref 80–100)
METHOD TYPE: SIGNIFICANT CHANGE UP
MONOCYTES # BLD AUTO: 0.36 K/UL — SIGNIFICANT CHANGE UP (ref 0–0.9)
MONOCYTES # BLD AUTO: 0.4 K/UL — SIGNIFICANT CHANGE UP (ref 0–0.9)
MONOCYTES # BLD AUTO: 0.55 K/UL — SIGNIFICANT CHANGE UP (ref 0–0.9)
MONOCYTES # BLD AUTO: 0.64 K/UL — SIGNIFICANT CHANGE UP (ref 0–0.9)
MONOCYTES # BLD AUTO: 0.89 K/UL — SIGNIFICANT CHANGE UP (ref 0–0.9)
MONOCYTES # BLD AUTO: 0.99 K/UL — HIGH (ref 0–0.9)
MONOCYTES # BLD AUTO: 1.02 K/UL — HIGH (ref 0–0.9)
MONOCYTES NFR BLD AUTO: 1.2 % — LOW (ref 2–14)
MONOCYTES NFR BLD AUTO: 1.7 % — LOW (ref 2–14)
MONOCYTES NFR BLD AUTO: 2.1 % — SIGNIFICANT CHANGE UP (ref 2–14)
MONOCYTES NFR BLD AUTO: 2.4 % — SIGNIFICANT CHANGE UP (ref 2–14)
MONOCYTES NFR BLD AUTO: 2.7 % — SIGNIFICANT CHANGE UP (ref 2–14)
MONOCYTES NFR BLD AUTO: 2.8 % — SIGNIFICANT CHANGE UP (ref 2–14)
MONOCYTES NFR BLD AUTO: 4.3 % — SIGNIFICANT CHANGE UP (ref 2–14)
MRSA PCR RESULT.: SIGNIFICANT CHANGE UP
MYELOCYTES NFR BLD: 1.8 % — HIGH (ref 0–0)
NEUTROPHILS # BLD AUTO: 17.71 K/UL — HIGH (ref 1.8–7.4)
NEUTROPHILS # BLD AUTO: 22.35 K/UL — HIGH (ref 1.8–7.4)
NEUTROPHILS # BLD AUTO: 23.24 K/UL — HIGH (ref 1.8–7.4)
NEUTROPHILS # BLD AUTO: 23.63 K/UL — HIGH (ref 1.8–7.4)
NEUTROPHILS # BLD AUTO: 24.84 K/UL — HIGH (ref 1.8–7.4)
NEUTROPHILS # BLD AUTO: 31.68 K/UL — HIGH (ref 1.8–7.4)
NEUTROPHILS # BLD AUTO: 33.39 K/UL — HIGH (ref 1.8–7.4)
NEUTROPHILS NFR BLD AUTO: 83.5 % — HIGH (ref 43–77)
NEUTROPHILS NFR BLD AUTO: 85.5 % — HIGH (ref 43–77)
NEUTROPHILS NFR BLD AUTO: 86.1 % — HIGH (ref 43–77)
NEUTROPHILS NFR BLD AUTO: 88.8 % — HIGH (ref 43–77)
NEUTROPHILS NFR BLD AUTO: 89.5 % — HIGH (ref 43–77)
NEUTROPHILS NFR BLD AUTO: 90.9 % — HIGH (ref 43–77)
NEUTROPHILS NFR BLD AUTO: 93 % — HIGH (ref 43–77)
NEUTS BAND # BLD: 0.9 % — SIGNIFICANT CHANGE UP (ref 0–8)
NITRITE UR-MCNC: NEGATIVE — SIGNIFICANT CHANGE UP
NON HDL CHOLESTEROL: 61 MG/DL — SIGNIFICANT CHANGE UP
NRBC # BLD: 1 /100 WBCS — HIGH (ref 0–0)
NRBC # BLD: 4 /100 WBCS — HIGH (ref 0–0)
NRBC # BLD: 5 /100 WBCS — HIGH (ref 0–0)
NT-PROBNP SERPL-SCNC: 4411 PG/ML — HIGH (ref 0–300)
NT-PROBNP SERPL-SCNC: 4910 PG/ML — HIGH (ref 0–300)
ORGANISM # SPEC MICROSCOPIC CNT: ABNORMAL
ORGANISM # SPEC MICROSCOPIC CNT: SIGNIFICANT CHANGE UP
ORGANISM # SPEC MICROSCOPIC CNT: SIGNIFICANT CHANGE UP
PCO2 BLDV: 37 MMHG — LOW (ref 42–55)
PCO2 BLDV: 41 MMHG — LOW (ref 42–55)
PH BLDV: 7.46 — HIGH (ref 7.32–7.43)
PH BLDV: 7.47 — HIGH (ref 7.32–7.43)
PH UR: 5.5 — SIGNIFICANT CHANGE UP (ref 5–8)
PHOSPHATE SERPL-MCNC: 10.2 MG/DL — HIGH (ref 2.4–4.7)
PHOSPHATE SERPL-MCNC: 2.2 MG/DL — LOW (ref 2.4–4.7)
PHOSPHATE SERPL-MCNC: 3.1 MG/DL — SIGNIFICANT CHANGE UP (ref 2.4–4.7)
PHOSPHATE SERPL-MCNC: 4.2 MG/DL — SIGNIFICANT CHANGE UP (ref 2.4–4.7)
PHOSPHATE SERPL-MCNC: 4.9 MG/DL — HIGH (ref 2.4–4.7)
PHOSPHATE SERPL-MCNC: 5.1 MG/DL — HIGH (ref 2.4–4.7)
PHOSPHATE SERPL-MCNC: 5.2 MG/DL — HIGH (ref 2.4–4.7)
PHOSPHATE SERPL-MCNC: 5.6 MG/DL — HIGH (ref 2.4–4.7)
PHOSPHATE SERPL-MCNC: 5.9 MG/DL — HIGH (ref 2.4–4.7)
PHOSPHATE SERPL-MCNC: 9.6 MG/DL — HIGH (ref 2.4–4.7)
PHOSPHATE SERPL-MCNC: 9.8 MG/DL — HIGH (ref 2.4–4.7)
PLAT MORPH BLD: NORMAL — SIGNIFICANT CHANGE UP
PLAT MORPH BLD: NORMAL — SIGNIFICANT CHANGE UP
PLATELET # BLD AUTO: 274 K/UL — SIGNIFICANT CHANGE UP (ref 150–400)
PLATELET # BLD AUTO: 276 K/UL — SIGNIFICANT CHANGE UP (ref 150–400)
PLATELET # BLD AUTO: 280 K/UL — SIGNIFICANT CHANGE UP (ref 150–400)
PLATELET # BLD AUTO: 310 K/UL — SIGNIFICANT CHANGE UP (ref 150–400)
PLATELET # BLD AUTO: 355 K/UL — SIGNIFICANT CHANGE UP (ref 150–400)
PLATELET # BLD AUTO: 361 K/UL — SIGNIFICANT CHANGE UP (ref 150–400)
PLATELET # BLD AUTO: 409 K/UL — HIGH (ref 150–400)
PO2 BLDV: <42 MMHG — SIGNIFICANT CHANGE UP (ref 25–45)
PO2 BLDV: <42 MMHG — SIGNIFICANT CHANGE UP (ref 25–45)
POIKILOCYTOSIS BLD QL AUTO: SIGNIFICANT CHANGE UP
POIKILOCYTOSIS BLD QL AUTO: SIGNIFICANT CHANGE UP
POLYCHROMASIA BLD QL SMEAR: SIGNIFICANT CHANGE UP
POLYCHROMASIA BLD QL SMEAR: SIGNIFICANT CHANGE UP
POTASSIUM BLDV-SCNC: 4.7 MMOL/L — SIGNIFICANT CHANGE UP (ref 3.5–5.1)
POTASSIUM BLDV-SCNC: 4.8 MMOL/L — SIGNIFICANT CHANGE UP (ref 3.5–5.1)
POTASSIUM SERPL-MCNC: 3 MMOL/L — LOW (ref 3.5–5.3)
POTASSIUM SERPL-MCNC: 3.2 MMOL/L — LOW (ref 3.5–5.3)
POTASSIUM SERPL-MCNC: 4.3 MMOL/L — SIGNIFICANT CHANGE UP (ref 3.5–5.3)
POTASSIUM SERPL-MCNC: 4.7 MMOL/L — SIGNIFICANT CHANGE UP (ref 3.5–5.3)
POTASSIUM SERPL-MCNC: 5.3 MMOL/L — SIGNIFICANT CHANGE UP (ref 3.5–5.3)
POTASSIUM SERPL-MCNC: 5.5 MMOL/L — HIGH (ref 3.5–5.3)
POTASSIUM SERPL-MCNC: 6.4 MMOL/L — CRITICAL HIGH (ref 3.5–5.3)
POTASSIUM SERPL-SCNC: 3 MMOL/L — LOW (ref 3.5–5.3)
POTASSIUM SERPL-SCNC: 3.2 MMOL/L — LOW (ref 3.5–5.3)
POTASSIUM SERPL-SCNC: 4.3 MMOL/L — SIGNIFICANT CHANGE UP (ref 3.5–5.3)
POTASSIUM SERPL-SCNC: 4.7 MMOL/L — SIGNIFICANT CHANGE UP (ref 3.5–5.3)
POTASSIUM SERPL-SCNC: 5.3 MMOL/L — SIGNIFICANT CHANGE UP (ref 3.5–5.3)
POTASSIUM SERPL-SCNC: 5.5 MMOL/L — HIGH (ref 3.5–5.3)
POTASSIUM SERPL-SCNC: 6.4 MMOL/L — CRITICAL HIGH (ref 3.5–5.3)
PREALB SERPL-MCNC: <4 MG/DL — LOW (ref 18–38)
PROT SERPL-MCNC: 4.4 G/DL — LOW (ref 6.6–8.7)
PROT SERPL-MCNC: 6 G/DL — LOW (ref 6.6–8.7)
PROT SERPL-MCNC: 6.1 G/DL — LOW (ref 6.6–8.7)
PROT SERPL-MCNC: 6.1 G/DL — LOW (ref 6.6–8.7)
PROT SERPL-MCNC: 6.2 G/DL — LOW (ref 6.6–8.7)
PROT SERPL-MCNC: 6.3 G/DL — LOW (ref 6.6–8.7)
PROT SERPL-MCNC: 6.4 G/DL — LOW (ref 6.6–8.7)
PROT SERPL-MCNC: 6.4 G/DL — LOW (ref 6.6–8.7)
PROT UR-MCNC: 100 MG/DL
PROTHROM AB SERPL-ACNC: 16.5 SEC — HIGH (ref 9.5–13)
PROTHROM AB SERPL-ACNC: 31.5 SEC — HIGH (ref 9.5–13)
PROTHROM AB SERPL-ACNC: 32.3 SEC — HIGH (ref 9.5–13)
RBC # BLD: 2.68 M/UL — LOW (ref 4.2–5.8)
RBC # BLD: 3.35 M/UL — LOW (ref 4.2–5.8)
RBC # BLD: 3.41 M/UL — LOW (ref 4.2–5.8)
RBC # BLD: 3.43 M/UL — LOW (ref 4.2–5.8)
RBC # BLD: 3.57 M/UL — LOW (ref 4.2–5.8)
RBC # BLD: 3.57 M/UL — LOW (ref 4.2–5.8)
RBC # BLD: 3.61 M/UL — LOW (ref 4.2–5.8)
RBC # BLD: 3.63 M/UL — LOW (ref 4.2–5.8)
RBC # FLD: 15.7 % — HIGH (ref 10.3–14.5)
RBC # FLD: 15.8 % — HIGH (ref 10.3–14.5)
RBC # FLD: 15.9 % — HIGH (ref 10.3–14.5)
RBC # FLD: 16.4 % — HIGH (ref 10.3–14.5)
RBC # FLD: 16.9 % — HIGH (ref 10.3–14.5)
RBC # FLD: 17.1 % — HIGH (ref 10.3–14.5)
RBC # FLD: 17.3 % — HIGH (ref 10.3–14.5)
RBC BLD AUTO: ABNORMAL
RBC BLD AUTO: ABNORMAL
RBC CASTS # UR COMP ASSIST: 20 /HPF — HIGH (ref 0–4)
RETICS #: 96.4 K/UL — SIGNIFICANT CHANGE UP (ref 25–125)
RETICS/RBC NFR: 2.7 % — HIGH (ref 0.5–2.5)
S AUREUS DNA NOSE QL NAA+PROBE: DETECTED
S PNEUM AG UR QL: NEGATIVE — SIGNIFICANT CHANGE UP
SAO2 % BLDV: 42.2 % — SIGNIFICANT CHANGE UP
SAO2 % BLDV: 43.9 % — SIGNIFICANT CHANGE UP
SMUDGE CELLS # BLD: PRESENT — SIGNIFICANT CHANGE UP
SMUDGE CELLS # BLD: PRESENT — SIGNIFICANT CHANGE UP
SODIUM SERPL-SCNC: 126 MMOL/L — LOW (ref 135–145)
SODIUM SERPL-SCNC: 128 MMOL/L — LOW (ref 135–145)
SODIUM SERPL-SCNC: 128 MMOL/L — LOW (ref 135–145)
SODIUM SERPL-SCNC: 130 MMOL/L — LOW (ref 135–145)
SODIUM SERPL-SCNC: 132 MMOL/L — LOW (ref 135–145)
SODIUM SERPL-SCNC: 138 MMOL/L — SIGNIFICANT CHANGE UP (ref 135–145)
SODIUM SERPL-SCNC: 140 MMOL/L — SIGNIFICANT CHANGE UP (ref 135–145)
SODIUM SERPL-SCNC: 140 MMOL/L — SIGNIFICANT CHANGE UP (ref 135–145)
SODIUM SERPL-SCNC: 141 MMOL/L — SIGNIFICANT CHANGE UP (ref 135–145)
SODIUM SERPL-SCNC: 142 MMOL/L — SIGNIFICANT CHANGE UP (ref 135–145)
SODIUM SERPL-SCNC: 143 MMOL/L — SIGNIFICANT CHANGE UP (ref 135–145)
SP GR SPEC: >1.03 — HIGH (ref 1–1.03)
SPECIMEN SOURCE: SIGNIFICANT CHANGE UP
TRIGL SERPL-MCNC: 185 MG/DL — HIGH
TRIGL SERPL-MCNC: 239 MG/DL — HIGH
TSH SERPL-MCNC: 1.51 UIU/ML — SIGNIFICANT CHANGE UP (ref 0.27–4.2)
UROBILINOGEN FLD QL: 1 MG/DL — SIGNIFICANT CHANGE UP (ref 0.2–1)
VANCOMYCIN TROUGH SERPL-MCNC: 12.9 UG/ML — SIGNIFICANT CHANGE UP (ref 10–20)
VANCOMYCIN TROUGH SERPL-MCNC: 16.4 UG/ML — SIGNIFICANT CHANGE UP (ref 10–20)
WBC # BLD: 20.71 K/UL — HIGH (ref 3.8–10.5)
WBC # BLD: 23.8 K/UL — HIGH (ref 3.8–10.5)
WBC # BLD: 25.99 K/UL — HIGH (ref 3.8–10.5)
WBC # BLD: 26.59 K/UL — HIGH (ref 3.8–10.5)
WBC # BLD: 29.77 K/UL — HIGH (ref 3.8–10.5)
WBC # BLD: 36.73 K/UL — HIGH (ref 3.8–10.5)
WBC # BLD: 36.75 K/UL — HIGH (ref 3.8–10.5)
WBC # FLD AUTO: 20.71 K/UL — HIGH (ref 3.8–10.5)
WBC # FLD AUTO: 23.8 K/UL — HIGH (ref 3.8–10.5)
WBC # FLD AUTO: 25.99 K/UL — HIGH (ref 3.8–10.5)
WBC # FLD AUTO: 26.59 K/UL — HIGH (ref 3.8–10.5)
WBC # FLD AUTO: 29.77 K/UL — HIGH (ref 3.8–10.5)
WBC # FLD AUTO: 36.73 K/UL — HIGH (ref 3.8–10.5)
WBC # FLD AUTO: 36.75 K/UL — HIGH (ref 3.8–10.5)
WBC UR QL: 5 /HPF — SIGNIFICANT CHANGE UP (ref 0–5)

## 2024-01-01 PROCEDURE — 99292 CRITICAL CARE ADDL 30 MIN: CPT

## 2024-01-01 PROCEDURE — 99223 1ST HOSP IP/OBS HIGH 75: CPT

## 2024-01-01 PROCEDURE — 86901 BLOOD TYPING SEROLOGIC RH(D): CPT

## 2024-01-01 PROCEDURE — 87086 URINE CULTURE/COLONY COUNT: CPT

## 2024-01-01 PROCEDURE — 84295 ASSAY OF SERUM SODIUM: CPT

## 2024-01-01 PROCEDURE — 84478 ASSAY OF TRIGLYCERIDES: CPT

## 2024-01-01 PROCEDURE — 93010 ELECTROCARDIOGRAM REPORT: CPT

## 2024-01-01 PROCEDURE — 99222 1ST HOSP IP/OBS MODERATE 55: CPT

## 2024-01-01 PROCEDURE — 87186 SC STD MICRODIL/AGAR DIL: CPT

## 2024-01-01 PROCEDURE — 94002 VENT MGMT INPAT INIT DAY: CPT

## 2024-01-01 PROCEDURE — 99291 CRITICAL CARE FIRST HOUR: CPT

## 2024-01-01 PROCEDURE — 82248 BILIRUBIN DIRECT: CPT

## 2024-01-01 PROCEDURE — 87641 MR-STAPH DNA AMP PROBE: CPT

## 2024-01-01 PROCEDURE — 87640 STAPH A DNA AMP PROBE: CPT

## 2024-01-01 PROCEDURE — 93005 ELECTROCARDIOGRAM TRACING: CPT

## 2024-01-01 PROCEDURE — 99292 CRITICAL CARE ADDL 30 MIN: CPT | Mod: GC

## 2024-01-01 PROCEDURE — 99232 SBSQ HOSP IP/OBS MODERATE 35: CPT

## 2024-01-01 PROCEDURE — 99291 CRITICAL CARE FIRST HOUR: CPT | Mod: GC

## 2024-01-01 PROCEDURE — 84132 ASSAY OF SERUM POTASSIUM: CPT

## 2024-01-01 PROCEDURE — 71045 X-RAY EXAM CHEST 1 VIEW: CPT | Mod: 26

## 2024-01-01 PROCEDURE — 83615 LACTATE (LD) (LDH) ENZYME: CPT

## 2024-01-01 PROCEDURE — 82435 ASSAY OF BLOOD CHLORIDE: CPT

## 2024-01-01 PROCEDURE — 80061 LIPID PANEL: CPT

## 2024-01-01 PROCEDURE — 86850 RBC ANTIBODY SCREEN: CPT

## 2024-01-01 PROCEDURE — 83735 ASSAY OF MAGNESIUM: CPT

## 2024-01-01 PROCEDURE — 95711 VEEG 2-12 HR UNMONITORED: CPT

## 2024-01-01 PROCEDURE — 71045 X-RAY EXAM CHEST 1 VIEW: CPT

## 2024-01-01 PROCEDURE — 83010 ASSAY OF HAPTOGLOBIN QUANT: CPT

## 2024-01-01 PROCEDURE — 93880 EXTRACRANIAL BILAT STUDY: CPT | Mod: 26

## 2024-01-01 PROCEDURE — 80048 BASIC METABOLIC PNL TOTAL CA: CPT

## 2024-01-01 PROCEDURE — 93880 EXTRACRANIAL BILAT STUDY: CPT

## 2024-01-01 PROCEDURE — 83605 ASSAY OF LACTIC ACID: CPT

## 2024-01-01 PROCEDURE — 87077 CULTURE AEROBIC IDENTIFY: CPT

## 2024-01-01 PROCEDURE — 86900 BLOOD TYPING SEROLOGIC ABO: CPT

## 2024-01-01 PROCEDURE — 82803 BLOOD GASES ANY COMBINATION: CPT

## 2024-01-01 PROCEDURE — 87150 DNA/RNA AMPLIFIED PROBE: CPT

## 2024-01-01 PROCEDURE — 85730 THROMBOPLASTIN TIME PARTIAL: CPT

## 2024-01-01 PROCEDURE — 85384 FIBRINOGEN ACTIVITY: CPT

## 2024-01-01 PROCEDURE — C8929: CPT

## 2024-01-01 PROCEDURE — 36415 COLL VENOUS BLD VENIPUNCTURE: CPT

## 2024-01-01 PROCEDURE — 95714 VEEG EA 12-26 HR UNMNTR: CPT

## 2024-01-01 PROCEDURE — 93306 TTE W/DOPPLER COMPLETE: CPT | Mod: 26

## 2024-01-01 PROCEDURE — 81001 URINALYSIS AUTO W/SCOPE: CPT

## 2024-01-01 PROCEDURE — 87899 AGENT NOS ASSAY W/OPTIC: CPT

## 2024-01-01 PROCEDURE — 84134 ASSAY OF PREALBUMIN: CPT

## 2024-01-01 PROCEDURE — 85018 HEMOGLOBIN: CPT

## 2024-01-01 PROCEDURE — 82962 GLUCOSE BLOOD TEST: CPT

## 2024-01-01 PROCEDURE — 84100 ASSAY OF PHOSPHORUS: CPT

## 2024-01-01 PROCEDURE — 95720 EEG PHY/QHP EA INCR W/VEEG: CPT

## 2024-01-01 PROCEDURE — 87449 NOS EACH ORGANISM AG IA: CPT

## 2024-01-01 PROCEDURE — 85025 COMPLETE CBC W/AUTO DIFF WBC: CPT

## 2024-01-01 PROCEDURE — 85610 PROTHROMBIN TIME: CPT

## 2024-01-01 PROCEDURE — 95700 EEG CONT REC W/VID EEG TECH: CPT

## 2024-01-01 PROCEDURE — 94640 AIRWAY INHALATION TREATMENT: CPT

## 2024-01-01 PROCEDURE — 99233 SBSQ HOSP IP/OBS HIGH 50: CPT

## 2024-01-01 PROCEDURE — 99497 ADVNCD CARE PLAN 30 MIN: CPT | Mod: 25

## 2024-01-01 PROCEDURE — 94760 N-INVAS EAR/PLS OXIMETRY 1: CPT

## 2024-01-01 PROCEDURE — 93926 LOWER EXTREMITY STUDY: CPT

## 2024-01-01 PROCEDURE — 87070 CULTURE OTHR SPECIMN AEROBIC: CPT

## 2024-01-01 PROCEDURE — 83880 ASSAY OF NATRIURETIC PEPTIDE: CPT

## 2024-01-01 PROCEDURE — 93926 LOWER EXTREMITY STUDY: CPT | Mod: 26,LT

## 2024-01-01 PROCEDURE — 80053 COMPREHEN METABOLIC PANEL: CPT

## 2024-01-01 PROCEDURE — 82947 ASSAY GLUCOSE BLOOD QUANT: CPT

## 2024-01-01 PROCEDURE — 83036 HEMOGLOBIN GLYCOSYLATED A1C: CPT

## 2024-01-01 PROCEDURE — 82140 ASSAY OF AMMONIA: CPT

## 2024-01-01 PROCEDURE — 82330 ASSAY OF CALCIUM: CPT

## 2024-01-01 PROCEDURE — 94003 VENT MGMT INPAT SUBQ DAY: CPT

## 2024-01-01 PROCEDURE — 85045 AUTOMATED RETICULOCYTE COUNT: CPT

## 2024-01-01 PROCEDURE — 87040 BLOOD CULTURE FOR BACTERIA: CPT

## 2024-01-01 PROCEDURE — 80202 ASSAY OF VANCOMYCIN: CPT

## 2024-01-01 PROCEDURE — 99498 ADVNCD CARE PLAN ADDL 30 MIN: CPT | Mod: 25

## 2024-01-01 PROCEDURE — 85014 HEMATOCRIT: CPT

## 2024-01-01 PROCEDURE — 95718 EEG PHYS/QHP 2-12 HR W/VEEG: CPT

## 2024-01-01 PROCEDURE — 84443 ASSAY THYROID STIM HORMONE: CPT

## 2024-01-01 RX ORDER — AMPICILLIN TRIHYDRATE 250 MG
2 CAPSULE ORAL EVERY 6 HOURS
Refills: 0 | Status: DISCONTINUED | OUTPATIENT
Start: 2024-01-01 | End: 2024-01-01

## 2024-01-01 RX ORDER — SODIUM BICARBONATE 1 MEQ/ML
50 SYRINGE (ML) INTRAVENOUS ONCE
Refills: 0 | Status: COMPLETED | OUTPATIENT
Start: 2024-01-01 | End: 2024-01-01

## 2024-01-01 RX ORDER — DEXTROSE 50 % IN WATER 50 %
25 SYRINGE (ML) INTRAVENOUS ONCE
Refills: 0 | Status: DISCONTINUED | OUTPATIENT
Start: 2024-01-01 | End: 2024-01-01

## 2024-01-01 RX ORDER — FUROSEMIDE 40 MG
80 TABLET ORAL ONCE
Refills: 0 | Status: COMPLETED | OUTPATIENT
Start: 2024-01-01 | End: 2024-01-01

## 2024-01-01 RX ORDER — ROCURONIUM BROMIDE 10 MG/ML
100 VIAL (ML) INTRAVENOUS ONCE
Refills: 0 | Status: COMPLETED | OUTPATIENT
Start: 2024-01-01 | End: 2024-01-01

## 2024-01-01 RX ORDER — BUMETANIDE 0.25 MG/ML
2 INJECTION INTRAMUSCULAR; INTRAVENOUS EVERY 8 HOURS
Refills: 0 | Status: DISCONTINUED | OUTPATIENT
Start: 2024-01-01 | End: 2024-01-01

## 2024-01-01 RX ORDER — INSULIN LISPRO 100/ML
VIAL (ML) SUBCUTANEOUS EVERY 6 HOURS
Refills: 0 | Status: DISCONTINUED | OUTPATIENT
Start: 2024-01-01 | End: 2024-01-01

## 2024-01-01 RX ORDER — DEXTROSE 50 % IN WATER 50 %
25 SYRINGE (ML) INTRAVENOUS
Refills: 0 | Status: DISCONTINUED | OUTPATIENT
Start: 2024-01-01 | End: 2024-01-01

## 2024-01-01 RX ORDER — FENTANYL CITRATE 50 UG/ML
1.5 INJECTION INTRAVENOUS
Qty: 2500 | Refills: 0 | Status: DISCONTINUED | OUTPATIENT
Start: 2024-01-01 | End: 2024-01-01

## 2024-01-01 RX ORDER — BUMETANIDE 0.25 MG/ML
1 INJECTION INTRAMUSCULAR; INTRAVENOUS
Qty: 20 | Refills: 0 | Status: DISCONTINUED | OUTPATIENT
Start: 2024-01-01 | End: 2024-01-01

## 2024-01-01 RX ORDER — VASOPRESSIN 20 [USP'U]/ML
0.04 INJECTION INTRAVENOUS
Qty: 40 | Refills: 0 | Status: DISCONTINUED | OUTPATIENT
Start: 2024-01-01 | End: 2024-01-01

## 2024-01-01 RX ORDER — PIPERACILLIN AND TAZOBACTAM 4; .5 G/20ML; G/20ML
3.38 INJECTION, POWDER, LYOPHILIZED, FOR SOLUTION INTRAVENOUS ONCE
Refills: 0 | Status: COMPLETED | OUTPATIENT
Start: 2024-01-01 | End: 2024-01-01

## 2024-01-01 RX ORDER — CALCIUM GLUCONATE 100 MG/ML
2 VIAL (ML) INTRAVENOUS ONCE
Refills: 0 | Status: COMPLETED | OUTPATIENT
Start: 2024-01-01 | End: 2024-01-01

## 2024-01-01 RX ORDER — SODIUM CHLORIDE 9 MG/ML
10 INJECTION INTRAMUSCULAR; INTRAVENOUS; SUBCUTANEOUS
Refills: 0 | Status: DISCONTINUED | OUTPATIENT
Start: 2024-01-01 | End: 2024-01-01

## 2024-01-01 RX ORDER — CISATRACURIUM BESYLATE 2 MG/ML
2 INJECTION INTRAVENOUS
Qty: 200 | Refills: 0 | Status: DISCONTINUED | OUTPATIENT
Start: 2024-01-01 | End: 2024-01-01

## 2024-01-01 RX ORDER — CEFTRIAXONE 500 MG/1
2000 INJECTION, POWDER, FOR SOLUTION INTRAMUSCULAR; INTRAVENOUS ONCE
Refills: 0 | Status: COMPLETED | OUTPATIENT
Start: 2024-01-01 | End: 2024-01-01

## 2024-01-01 RX ORDER — PROPOFOL 10 MG/ML
35 INJECTION, EMULSION INTRAVENOUS
Qty: 1000 | Refills: 0 | Status: DISCONTINUED | OUTPATIENT
Start: 2024-01-01 | End: 2024-01-01

## 2024-01-01 RX ORDER — POTASSIUM CHLORIDE 20 MEQ
20 PACKET (EA) ORAL
Refills: 0 | Status: DISCONTINUED | OUTPATIENT
Start: 2024-01-01 | End: 2024-01-01

## 2024-01-01 RX ORDER — SODIUM CHLORIDE 9 MG/ML
1000 INJECTION, SOLUTION INTRAVENOUS
Refills: 0 | Status: DISCONTINUED | OUTPATIENT
Start: 2024-01-01 | End: 2024-01-01

## 2024-01-01 RX ORDER — HYDROMORPHONE HYDROCHLORIDE 2 MG/ML
1 INJECTION INTRAMUSCULAR; INTRAVENOUS; SUBCUTANEOUS
Refills: 0 | Status: DISCONTINUED | OUTPATIENT
Start: 2024-01-01 | End: 2024-01-01

## 2024-01-01 RX ORDER — NOREPINEPHRINE BITARTRATE/D5W 8 MG/250ML
0.05 PLASTIC BAG, INJECTION (ML) INTRAVENOUS
Qty: 16 | Refills: 0 | Status: DISCONTINUED | OUTPATIENT
Start: 2024-01-01 | End: 2024-01-01

## 2024-01-01 RX ORDER — VANCOMYCIN HCL 1 G
20 VIAL (EA) INTRAVENOUS EVERY 8 HOURS
Refills: 0 | Status: DISCONTINUED | OUTPATIENT
Start: 2024-01-01 | End: 2024-01-01

## 2024-01-01 RX ORDER — INSULIN HUMAN 100 [IU]/ML
8 INJECTION, SOLUTION SUBCUTANEOUS
Qty: 100 | Refills: 0 | Status: DISCONTINUED | OUTPATIENT
Start: 2024-01-01 | End: 2024-01-01

## 2024-01-01 RX ORDER — AMPICILLIN TRIHYDRATE 250 MG
2 CAPSULE ORAL EVERY 4 HOURS
Refills: 0 | Status: DISCONTINUED | OUTPATIENT
Start: 2024-01-01 | End: 2024-01-01

## 2024-01-01 RX ORDER — HEPARIN SODIUM 5000 [USP'U]/ML
5000 INJECTION INTRAVENOUS; SUBCUTANEOUS EVERY 8 HOURS
Refills: 0 | Status: DISCONTINUED | OUTPATIENT
Start: 2024-01-01 | End: 2024-01-01

## 2024-01-01 RX ORDER — CEFTRIAXONE 500 MG/1
INJECTION, POWDER, FOR SOLUTION INTRAMUSCULAR; INTRAVENOUS
Refills: 0 | Status: DISCONTINUED | OUTPATIENT
Start: 2024-01-01 | End: 2024-01-01

## 2024-01-01 RX ORDER — ALBUTEROL 90 UG/1
2.5 AEROSOL, METERED ORAL ONCE
Refills: 0 | Status: COMPLETED | OUTPATIENT
Start: 2024-01-01 | End: 2024-01-01

## 2024-01-01 RX ORDER — CHLORHEXIDINE GLUCONATE 213 G/1000ML
15 SOLUTION TOPICAL EVERY 12 HOURS
Refills: 0 | Status: DISCONTINUED | OUTPATIENT
Start: 2024-01-01 | End: 2024-01-01

## 2024-01-01 RX ORDER — FENTANYL CITRATE 50 UG/ML
0.5 INJECTION INTRAVENOUS
Qty: 2500 | Refills: 0 | Status: DISCONTINUED | OUTPATIENT
Start: 2024-01-01 | End: 2024-01-01

## 2024-01-01 RX ORDER — CALCIUM GLUCONATE 100 MG/ML
1 VIAL (ML) INTRAVENOUS ONCE
Refills: 0 | Status: COMPLETED | OUTPATIENT
Start: 2024-01-01 | End: 2024-01-01

## 2024-01-01 RX ORDER — AMPICILLIN TRIHYDRATE 250 MG
CAPSULE ORAL
Refills: 0 | Status: DISCONTINUED | OUTPATIENT
Start: 2024-01-01 | End: 2024-01-01

## 2024-01-01 RX ORDER — POTASSIUM CHLORIDE 20 MEQ
40 PACKET (EA) ORAL EVERY 4 HOURS
Refills: 0 | Status: COMPLETED | OUTPATIENT
Start: 2024-01-01 | End: 2024-01-01

## 2024-01-01 RX ORDER — DEXTROSE 50 % IN WATER 50 %
12.5 SYRINGE (ML) INTRAVENOUS ONCE
Refills: 0 | Status: DISCONTINUED | OUTPATIENT
Start: 2024-01-01 | End: 2024-01-01

## 2024-01-01 RX ORDER — INSULIN HUMAN 100 [IU]/ML
5 INJECTION, SOLUTION SUBCUTANEOUS ONCE
Refills: 0 | Status: COMPLETED | OUTPATIENT
Start: 2024-01-01 | End: 2024-01-01

## 2024-01-01 RX ORDER — POTASSIUM CHLORIDE 20 MEQ
20 PACKET (EA) ORAL
Refills: 0 | Status: COMPLETED | OUTPATIENT
Start: 2024-01-01 | End: 2024-01-01

## 2024-01-01 RX ORDER — DEXTROSE 50 % IN WATER 50 %
15 SYRINGE (ML) INTRAVENOUS ONCE
Refills: 0 | Status: DISCONTINUED | OUTPATIENT
Start: 2024-01-01 | End: 2024-01-01

## 2024-01-01 RX ORDER — MIDAZOLAM HYDROCHLORIDE 1 MG/ML
2 INJECTION, SOLUTION INTRAMUSCULAR; INTRAVENOUS ONCE
Refills: 0 | Status: DISCONTINUED | OUTPATIENT
Start: 2024-01-01 | End: 2024-01-01

## 2024-01-01 RX ORDER — HYDROCORTISONE 20 MG
50 TABLET ORAL EVERY 6 HOURS
Refills: 0 | Status: DISCONTINUED | OUTPATIENT
Start: 2024-01-01 | End: 2024-01-01

## 2024-01-01 RX ORDER — BUMETANIDE 0.25 MG/ML
2 INJECTION INTRAMUSCULAR; INTRAVENOUS ONCE
Refills: 0 | Status: COMPLETED | OUTPATIENT
Start: 2024-01-01 | End: 2024-01-01

## 2024-01-01 RX ORDER — PROPOFOL 10 MG/ML
40 INJECTION, EMULSION INTRAVENOUS
Qty: 1000 | Refills: 0 | Status: DISCONTINUED | OUTPATIENT
Start: 2024-01-01 | End: 2024-01-01

## 2024-01-01 RX ORDER — CEFTRIAXONE 500 MG/1
2000 INJECTION, POWDER, FOR SOLUTION INTRAMUSCULAR; INTRAVENOUS EVERY 12 HOURS
Refills: 0 | Status: DISCONTINUED | OUTPATIENT
Start: 2024-01-01 | End: 2024-01-01

## 2024-01-01 RX ORDER — SODIUM BICARBONATE 1 MEQ/ML
0.28 SYRINGE (ML) INTRAVENOUS
Qty: 150 | Refills: 0 | Status: DISCONTINUED | OUTPATIENT
Start: 2024-01-01 | End: 2024-01-01

## 2024-01-01 RX ORDER — CHLORHEXIDINE GLUCONATE 213 G/1000ML
1 SOLUTION TOPICAL
Refills: 0 | Status: DISCONTINUED | OUTPATIENT
Start: 2024-01-01 | End: 2024-01-01

## 2024-01-01 RX ORDER — BUMETANIDE 0.25 MG/ML
2 INJECTION INTRAMUSCULAR; INTRAVENOUS
Qty: 20 | Refills: 0 | Status: DISCONTINUED | OUTPATIENT
Start: 2024-01-01 | End: 2024-01-01

## 2024-01-01 RX ORDER — VANCOMYCIN HCL 1 G
VIAL (EA) INTRAVENOUS
Refills: 0 | Status: DISCONTINUED | OUTPATIENT
Start: 2024-01-01 | End: 2024-01-01

## 2024-01-01 RX ORDER — DEXTROSE 50 % IN WATER 50 %
50 SYRINGE (ML) INTRAVENOUS ONCE
Refills: 0 | Status: COMPLETED | OUTPATIENT
Start: 2024-01-01 | End: 2024-01-01

## 2024-01-01 RX ORDER — VANCOMYCIN HCL 1 G
1000 VIAL (EA) INTRAVENOUS EVERY 12 HOURS
Refills: 0 | Status: DISCONTINUED | OUTPATIENT
Start: 2024-01-01 | End: 2024-01-01

## 2024-01-01 RX ORDER — INSULIN HUMAN 100 [IU]/ML
3 INJECTION, SOLUTION SUBCUTANEOUS
Qty: 100 | Refills: 0 | Status: DISCONTINUED | OUTPATIENT
Start: 2024-01-01 | End: 2024-01-01

## 2024-01-01 RX ORDER — HYDROMORPHONE HYDROCHLORIDE 2 MG/ML
2 INJECTION INTRAMUSCULAR; INTRAVENOUS; SUBCUTANEOUS
Refills: 0 | Status: DISCONTINUED | OUTPATIENT
Start: 2024-01-01 | End: 2024-01-01

## 2024-01-01 RX ORDER — ACETAMINOPHEN 500 MG
1000 TABLET ORAL ONCE
Refills: 0 | Status: COMPLETED | OUTPATIENT
Start: 2024-01-01 | End: 2024-01-01

## 2024-01-01 RX ORDER — INSULIN LISPRO 100/ML
4 VIAL (ML) SUBCUTANEOUS ONCE
Refills: 0 | Status: COMPLETED | OUTPATIENT
Start: 2024-01-01 | End: 2024-01-01

## 2024-01-01 RX ORDER — CHLORHEXIDINE GLUCONATE 213 G/1000ML
1 SOLUTION TOPICAL DAILY
Refills: 0 | Status: DISCONTINUED | OUTPATIENT
Start: 2024-01-01 | End: 2024-01-01

## 2024-01-01 RX ORDER — AMPICILLIN TRIHYDRATE 250 MG
2 CAPSULE ORAL ONCE
Refills: 0 | Status: COMPLETED | OUTPATIENT
Start: 2024-01-01 | End: 2024-01-01

## 2024-01-01 RX ORDER — AMIODARONE HYDROCHLORIDE 400 MG/1
1 TABLET ORAL
Qty: 450 | Refills: 0 | Status: DISCONTINUED | OUTPATIENT
Start: 2024-01-01 | End: 2024-01-01

## 2024-01-01 RX ORDER — CISATRACURIUM BESYLATE 2 MG/ML
3 INJECTION INTRAVENOUS
Qty: 200 | Refills: 0 | Status: DISCONTINUED | OUTPATIENT
Start: 2024-01-01 | End: 2024-01-01

## 2024-01-01 RX ORDER — DEXTROSE 50 % IN WATER 50 %
50 SYRINGE (ML) INTRAVENOUS
Refills: 0 | Status: DISCONTINUED | OUTPATIENT
Start: 2024-01-01 | End: 2024-01-01

## 2024-01-01 RX ORDER — PANTOPRAZOLE SODIUM 20 MG/1
40 TABLET, DELAYED RELEASE ORAL DAILY
Refills: 0 | Status: DISCONTINUED | OUTPATIENT
Start: 2024-01-01 | End: 2024-01-01

## 2024-01-01 RX ORDER — GLUCAGON INJECTION, SOLUTION 0.5 MG/.1ML
1 INJECTION, SOLUTION SUBCUTANEOUS ONCE
Refills: 0 | Status: DISCONTINUED | OUTPATIENT
Start: 2024-01-01 | End: 2024-01-01

## 2024-01-01 RX ORDER — AMIODARONE HYDROCHLORIDE 400 MG/1
150 TABLET ORAL ONCE
Refills: 0 | Status: COMPLETED | OUTPATIENT
Start: 2024-01-01 | End: 2024-01-01

## 2024-01-01 RX ADMIN — Medication 250 MILLIGRAM(S): at 07:52

## 2024-01-01 RX ADMIN — CHLORHEXIDINE GLUCONATE 1 APPLICATION(S): 213 SOLUTION TOPICAL at 10:08

## 2024-01-01 RX ADMIN — Medication 200 GRAM(S): at 09:05

## 2024-01-01 RX ADMIN — CISATRACURIUM BESYLATE 14.4 MICROGRAM(S)/KG/MIN: 2 INJECTION INTRAVENOUS at 18:34

## 2024-01-01 RX ADMIN — FENTANYL CITRATE 12 MICROGRAM(S)/KG/HR: 50 INJECTION INTRAVENOUS at 02:27

## 2024-01-01 RX ADMIN — CHLORHEXIDINE GLUCONATE 1 APPLICATION(S): 213 SOLUTION TOPICAL at 05:09

## 2024-01-01 RX ADMIN — Medication 1 APPLICATION(S): at 23:53

## 2024-01-01 RX ADMIN — HYDROMORPHONE HYDROCHLORIDE 2 MILLIGRAM(S): 2 INJECTION INTRAMUSCULAR; INTRAVENOUS; SUBCUTANEOUS at 12:55

## 2024-01-01 RX ADMIN — CHLORHEXIDINE GLUCONATE 1 APPLICATION(S): 213 SOLUTION TOPICAL at 18:33

## 2024-01-01 RX ADMIN — PANTOPRAZOLE SODIUM 40 MILLIGRAM(S): 20 TABLET, DELAYED RELEASE ORAL at 12:18

## 2024-01-01 RX ADMIN — PROPOFOL 19.2 MICROGRAM(S)/KG/MIN: 10 INJECTION, EMULSION INTRAVENOUS at 21:54

## 2024-01-01 RX ADMIN — AMIODARONE HYDROCHLORIDE 618 MILLIGRAM(S): 400 TABLET ORAL at 13:31

## 2024-01-01 RX ADMIN — HEPARIN SODIUM 5000 UNIT(S): 5000 INJECTION INTRAVENOUS; SUBCUTANEOUS at 05:11

## 2024-01-01 RX ADMIN — Medication 8: at 17:44

## 2024-01-01 RX ADMIN — HYDROMORPHONE HYDROCHLORIDE 1 MILLIGRAM(S): 2 INJECTION INTRAMUSCULAR; INTRAVENOUS; SUBCUTANEOUS at 05:29

## 2024-01-01 RX ADMIN — VASOPRESSIN 6 UNIT(S)/MIN: 20 INJECTION INTRAVENOUS at 02:20

## 2024-01-01 RX ADMIN — Medication 100 MILLIEQUIVALENT(S): at 01:50

## 2024-01-01 RX ADMIN — Medication 4 UNIT(S): at 00:01

## 2024-01-01 RX ADMIN — Medication 50 MILLIGRAM(S): at 05:09

## 2024-01-01 RX ADMIN — Medication 40 MILLIEQUIVALENT(S): at 13:17

## 2024-01-01 RX ADMIN — PANTOPRAZOLE SODIUM 40 MILLIGRAM(S): 20 TABLET, DELAYED RELEASE ORAL at 10:05

## 2024-01-01 RX ADMIN — PANTOPRAZOLE SODIUM 40 MILLIGRAM(S): 20 TABLET, DELAYED RELEASE ORAL at 11:05

## 2024-01-01 RX ADMIN — BUMETANIDE 2 MILLIGRAM(S): 0.25 INJECTION INTRAMUSCULAR; INTRAVENOUS at 01:31

## 2024-01-01 RX ADMIN — Medication 50 MILLIEQUIVALENT(S): at 13:32

## 2024-01-01 RX ADMIN — Medication 100 MILLIEQUIVALENT(S): at 09:30

## 2024-01-01 RX ADMIN — Medication 200 GRAM(S): at 02:10

## 2024-01-01 RX ADMIN — ALBUTEROL 2.5 MILLIGRAM(S): 90 AEROSOL, METERED ORAL at 04:42

## 2024-01-01 RX ADMIN — Medication 200 GRAM(S): at 02:32

## 2024-01-01 RX ADMIN — Medication 1 APPLICATION(S): at 05:45

## 2024-01-01 RX ADMIN — AMIODARONE HYDROCHLORIDE 33.3 MG/MIN: 400 TABLET ORAL at 13:25

## 2024-01-01 RX ADMIN — CEFTRIAXONE 2000 MILLIGRAM(S): 500 INJECTION, POWDER, FOR SOLUTION INTRAMUSCULAR; INTRAVENOUS at 16:28

## 2024-01-01 RX ADMIN — FENTANYL CITRATE 4 MICROGRAM(S)/KG/HR: 50 INJECTION INTRAVENOUS at 04:46

## 2024-01-01 RX ADMIN — CHLORHEXIDINE GLUCONATE 15 MILLILITER(S): 213 SOLUTION TOPICAL at 05:09

## 2024-01-01 RX ADMIN — CHLORHEXIDINE GLUCONATE 1 APPLICATION(S): 213 SOLUTION TOPICAL at 11:05

## 2024-01-01 RX ADMIN — Medication 50 MILLIGRAM(S): at 00:00

## 2024-01-01 RX ADMIN — INSULIN HUMAN 5 UNIT(S): 100 INJECTION, SOLUTION SUBCUTANEOUS at 08:08

## 2024-01-01 RX ADMIN — BUMETANIDE 5 MG/HR: 0.25 INJECTION INTRAMUSCULAR; INTRAVENOUS at 17:57

## 2024-01-01 RX ADMIN — PROPOFOL 16.8 MICROGRAM(S)/KG/MIN: 10 INJECTION, EMULSION INTRAVENOUS at 22:44

## 2024-01-01 RX ADMIN — Medication 200 GRAM(S): at 05:44

## 2024-01-01 RX ADMIN — Medication 6: at 12:17

## 2024-01-01 RX ADMIN — PROPOFOL 16.8 MICROGRAM(S)/KG/MIN: 10 INJECTION, EMULSION INTRAVENOUS at 11:20

## 2024-01-01 RX ADMIN — CHLORHEXIDINE GLUCONATE 15 MILLILITER(S): 213 SOLUTION TOPICAL at 05:44

## 2024-01-01 RX ADMIN — Medication 50 MILLIGRAM(S): at 17:40

## 2024-01-01 RX ADMIN — CEFTRIAXONE 2000 MILLIGRAM(S): 500 INJECTION, POWDER, FOR SOLUTION INTRAMUSCULAR; INTRAVENOUS at 05:09

## 2024-01-01 RX ADMIN — Medication 50 MILLIEQUIVALENT(S): at 08:06

## 2024-01-01 RX ADMIN — Medication 40 MILLIEQUIVALENT(S): at 09:31

## 2024-01-01 RX ADMIN — PIPERACILLIN AND TAZOBACTAM 200 GRAM(S): 4; .5 INJECTION, POWDER, LYOPHILIZED, FOR SOLUTION INTRAVENOUS at 18:33

## 2024-01-01 RX ADMIN — CHLORHEXIDINE GLUCONATE 15 MILLILITER(S): 213 SOLUTION TOPICAL at 16:28

## 2024-01-01 RX ADMIN — Medication 200 GRAM(S): at 01:38

## 2024-01-01 RX ADMIN — Medication 200 GRAM(S): at 05:40

## 2024-01-01 RX ADMIN — Medication 40 MILLIEQUIVALENT(S): at 06:38

## 2024-01-01 RX ADMIN — Medication 200 GRAM(S): at 21:52

## 2024-01-01 RX ADMIN — PROPOFOL 16.8 MICROGRAM(S)/KG/MIN: 10 INJECTION, EMULSION INTRAVENOUS at 06:53

## 2024-01-01 RX ADMIN — HYDROMORPHONE HYDROCHLORIDE 1 MILLIGRAM(S): 2 INJECTION INTRAMUSCULAR; INTRAVENOUS; SUBCUTANEOUS at 01:00

## 2024-01-01 RX ADMIN — VASOPRESSIN 6 UNIT(S)/MIN: 20 INJECTION INTRAVENOUS at 10:05

## 2024-01-01 RX ADMIN — CEFTRIAXONE 2000 MILLIGRAM(S): 500 INJECTION, POWDER, FOR SOLUTION INTRAMUSCULAR; INTRAVENOUS at 17:39

## 2024-01-01 RX ADMIN — HYDROMORPHONE HYDROCHLORIDE 1 MILLIGRAM(S): 2 INJECTION INTRAMUSCULAR; INTRAVENOUS; SUBCUTANEOUS at 06:20

## 2024-01-01 RX ADMIN — HEPARIN SODIUM 5000 UNIT(S): 5000 INJECTION INTRAVENOUS; SUBCUTANEOUS at 13:18

## 2024-01-01 RX ADMIN — Medication 80 MILLIGRAM(S): at 23:09

## 2024-01-01 RX ADMIN — HYDROMORPHONE HYDROCHLORIDE 1 MILLIGRAM(S): 2 INJECTION INTRAMUSCULAR; INTRAVENOUS; SUBCUTANEOUS at 16:15

## 2024-01-01 RX ADMIN — Medication 12: at 05:41

## 2024-01-01 RX ADMIN — Medication 50 MILLIEQUIVALENT(S): at 13:25

## 2024-01-01 RX ADMIN — BUMETANIDE 5 MG/HR: 0.25 INJECTION INTRAMUSCULAR; INTRAVENOUS at 22:20

## 2024-01-01 RX ADMIN — Medication 1 APPLICATION(S): at 18:38

## 2024-01-01 RX ADMIN — CHLORHEXIDINE GLUCONATE 15 MILLILITER(S): 213 SOLUTION TOPICAL at 17:50

## 2024-01-01 RX ADMIN — Medication 12: at 23:59

## 2024-01-01 RX ADMIN — Medication 50 MILLIGRAM(S): at 05:43

## 2024-01-01 RX ADMIN — HEPARIN SODIUM 5000 UNIT(S): 5000 INJECTION INTRAVENOUS; SUBCUTANEOUS at 05:43

## 2024-01-01 RX ADMIN — Medication 200 GRAM(S): at 17:36

## 2024-01-01 RX ADMIN — Medication 50 MILLIGRAM(S): at 21:53

## 2024-01-01 RX ADMIN — PIPERACILLIN AND TAZOBACTAM 25 GRAM(S): 4; .5 INJECTION, POWDER, LYOPHILIZED, FOR SOLUTION INTRAVENOUS at 21:59

## 2024-01-01 RX ADMIN — VASOPRESSIN 6 UNIT(S)/MIN: 20 INJECTION INTRAVENOUS at 17:36

## 2024-01-01 RX ADMIN — Medication 1 APPLICATION(S): at 11:05

## 2024-01-01 RX ADMIN — INSULIN HUMAN 8 UNIT(S)/HR: 100 INJECTION, SOLUTION SUBCUTANEOUS at 08:02

## 2024-01-01 RX ADMIN — Medication 200 GRAM(S): at 22:16

## 2024-01-01 RX ADMIN — Medication 100 MILLIEQUIVALENT(S): at 06:49

## 2024-01-01 RX ADMIN — Medication 50 MILLIEQUIVALENT(S): at 08:24

## 2024-01-01 RX ADMIN — Medication 200 GRAM(S): at 10:02

## 2024-01-01 RX ADMIN — Medication 200 GRAM(S): at 00:32

## 2024-01-01 RX ADMIN — Medication 200 GRAM(S): at 16:29

## 2024-01-01 RX ADMIN — CEFTRIAXONE 2000 MILLIGRAM(S): 500 INJECTION, POWDER, FOR SOLUTION INTRAMUSCULAR; INTRAVENOUS at 00:00

## 2024-01-01 RX ADMIN — Medication 50 MILLILITER(S): at 08:06

## 2024-01-01 RX ADMIN — PROPOFOL 16.8 MICROGRAM(S)/KG/MIN: 10 INJECTION, EMULSION INTRAVENOUS at 08:38

## 2024-01-01 RX ADMIN — HYDROMORPHONE HYDROCHLORIDE 1 MILLIGRAM(S): 2 INJECTION INTRAMUSCULAR; INTRAVENOUS; SUBCUTANEOUS at 02:43

## 2024-01-01 RX ADMIN — HEPARIN SODIUM 5000 UNIT(S): 5000 INJECTION INTRAVENOUS; SUBCUTANEOUS at 22:16

## 2024-01-01 RX ADMIN — Medication 200 GRAM(S): at 10:46

## 2024-01-01 RX ADMIN — Medication 50 MILLIGRAM(S): at 11:05

## 2024-01-01 RX ADMIN — Medication 200 GRAM(S): at 13:31

## 2024-01-01 RX ADMIN — Medication 100 MILLIEQUIVALENT(S): at 07:39

## 2024-01-01 RX ADMIN — BUMETANIDE 5 MG/HR: 0.25 INJECTION INTRAMUSCULAR; INTRAVENOUS at 02:20

## 2024-01-01 RX ADMIN — HYDROMORPHONE HYDROCHLORIDE 1 MILLIGRAM(S): 2 INJECTION INTRAMUSCULAR; INTRAVENOUS; SUBCUTANEOUS at 16:30

## 2024-01-01 RX ADMIN — Medication 200 GRAM(S): at 14:55

## 2024-01-01 RX ADMIN — PROPOFOL 16.8 MICROGRAM(S)/KG/MIN: 10 INJECTION, EMULSION INTRAVENOUS at 04:19

## 2024-01-01 RX ADMIN — CHLORHEXIDINE GLUCONATE 1 APPLICATION(S): 213 SOLUTION TOPICAL at 12:16

## 2024-01-01 RX ADMIN — Medication 400 MILLIGRAM(S): at 21:52

## 2024-01-01 RX ADMIN — Medication 4: at 06:23

## 2024-01-01 RX ADMIN — HEPARIN SODIUM 5000 UNIT(S): 5000 INJECTION INTRAVENOUS; SUBCUTANEOUS at 14:55

## 2024-01-01 RX ADMIN — PROPOFOL 19.2 MICROGRAM(S)/KG/MIN: 10 INJECTION, EMULSION INTRAVENOUS at 02:27

## 2024-01-01 RX ADMIN — PROPOFOL 19.2 MICROGRAM(S)/KG/MIN: 10 INJECTION, EMULSION INTRAVENOUS at 12:00

## 2024-01-01 RX ADMIN — BUMETANIDE 10 MG/HR: 0.25 INJECTION INTRAMUSCULAR; INTRAVENOUS at 05:42

## 2024-01-01 RX ADMIN — CEFTRIAXONE 2000 MILLIGRAM(S): 500 INJECTION, POWDER, FOR SOLUTION INTRAMUSCULAR; INTRAVENOUS at 05:43

## 2024-01-01 RX ADMIN — CHLORHEXIDINE GLUCONATE 15 MILLILITER(S): 213 SOLUTION TOPICAL at 05:42

## 2024-01-01 RX ADMIN — Medication 3.75 MICROGRAM(S)/KG/MIN: at 00:19

## 2024-01-01 RX ADMIN — Medication 100 MILLIEQUIVALENT(S): at 02:51

## 2024-01-01 RX ADMIN — Medication 250 MILLIGRAM(S): at 21:44

## 2024-01-01 RX ADMIN — Medication 4 MILLIGRAM(S): at 14:36

## 2024-01-01 RX ADMIN — PROPOFOL 16.8 MICROGRAM(S)/KG/MIN: 10 INJECTION, EMULSION INTRAVENOUS at 23:35

## 2024-01-01 RX ADMIN — Medication 100 MILLIGRAM(S): at 18:08

## 2024-01-01 RX ADMIN — Medication 200 GRAM(S): at 13:16

## 2024-01-01 RX ADMIN — HYDROMORPHONE HYDROCHLORIDE 2 MILLIGRAM(S): 2 INJECTION INTRAMUSCULAR; INTRAVENOUS; SUBCUTANEOUS at 12:37

## 2024-01-01 RX ADMIN — HEPARIN SODIUM 5000 UNIT(S): 5000 INJECTION INTRAVENOUS; SUBCUTANEOUS at 21:44

## 2024-01-01 RX ADMIN — Medication 50 MILLIGRAM(S): at 12:17

## 2024-01-01 RX ADMIN — Medication 200 GRAM(S): at 05:09

## 2024-01-01 RX ADMIN — BUMETANIDE 2 MILLIGRAM(S): 0.25 INJECTION INTRAMUSCULAR; INTRAVENOUS at 16:28

## 2024-01-01 RX ADMIN — Medication 50 MILLIGRAM(S): at 23:59

## 2024-01-01 RX ADMIN — Medication 100 GRAM(S): at 04:33

## 2024-01-01 RX ADMIN — PROPOFOL 16.8 MICROGRAM(S)/KG/MIN: 10 INJECTION, EMULSION INTRAVENOUS at 16:27

## 2024-01-01 RX ADMIN — HEPARIN SODIUM 5000 UNIT(S): 5000 INJECTION INTRAVENOUS; SUBCUTANEOUS at 21:53

## 2024-01-01 RX ADMIN — Medication 50 MILLIGRAM(S): at 05:42

## 2024-01-01 RX ADMIN — HYDROMORPHONE HYDROCHLORIDE 1 MILLIGRAM(S): 2 INJECTION INTRAMUSCULAR; INTRAVENOUS; SUBCUTANEOUS at 00:00

## 2024-03-04 NOTE — CONSULT NOTE ADULT - PROBLEM SELECTOR RECOMMENDATION 9
TTE from [facility] showing Vegetations of Mitral/Tricuspid/Aortic Valves  BCx from [facility] - Preliminary results showing gram + cocci   On Zosyn, Vanco  ID consult  Septic shock requiring pressor support - Levo/Vaso     Multiple infarcted areas on CT scan from [facility] - renal/splenic/brain  Recommend Neuro consult/Neurosurgical consult    Remaining CTS preop workup ordered    CTS will follow    d/w CTS Attending on call TTE from [facility] showing Vegetations of Mitral/Tricuspid/Aortic Valves  BCx from [facility] - Preliminary results showing gram + cocci   On Zosyn, Vanco  ID consult  Septic shock requiring pressor support - Levo/Vaso   Recommend Cardiology Consult  Multiple infarcted areas on CT scan from [facility] - renal/splenic/brain  Recommend Neuro consult/Neurosurgical consult    Remaining CTS preop workup ordered    CTS will follow    d/w CTS Attending on call
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
## 2024-03-04 NOTE — H&P ADULT - NSHPPHYSICALEXAM_GEN_ALL_CORE
Intubated, sedated on propofol  pupils reactive  bilat air entry  reg rhythm  right groin cordis being removed by resident  left groin art line present  left IJ present  on norepinephrine via IJ TLC

## 2024-03-04 NOTE — H&P ADULT - ASSESSMENT
Impression  Cardiac arrest  Mitral/ Aortic/ Tricuspid valve endocarditis  Bacteremia  Acute respiratory failure  Multifocal pneumonia  Distributive shock    Plan  - broad spectrum abx pending culture and sensitivities  - mechanical ventilation  - repeat echo here at [facility]  - MRI brain, EEG  - titrate vasopressors  - tube feeds  - DVT and GI prophylaxis  - propofol for sedation  - discussed with cardiac surgery ACP, no immediate plans for intervention per surgeon   Prognosis guarded

## 2024-03-04 NOTE — H&P ADULT - HISTORY OF PRESENT ILLNESS
47 yo male currently incarcerated, with hx of Crohn's disease, HTN, anxiety, [place] [facility] [place] by ambulance with complains of dyspnea for 3 days, While in triage patient became unresponsive and suffered a cardiac arrest, was successfully resuscitated.  He was intubated, central lines were placed in the left IJ and right groin, art line in the left groin.  Patient found to have mitral, tricuspid, and aortic valve endocarditis, CTA performed at [facility] showed splenic and renal infarcts, multifocal pneumonia, bilateral effusions, no PE,  CT brain was poor quality but revealed a low-density lesion in the left centrum semiovale.  Patient was transferred to the MICU at [facility] on the evening of 3/4/24 accompanied by [facility].

## 2024-03-05 NOTE — CONSULT NOTE ADULT - ASSESSMENT
The patient is a 48y Male with endocarditis and embolic shower with multiple organ damage including likely stroke.     Stroke.   Likely embolic on basis of endocarditis.   On anticoagulation with heparin for now.   Statin deferred as LDL at goal< 70.  Neuro checks per ICU.  Agree with plan for brain MRI.   Stroke team to follow.     Vent management per ICU.     Case discussed with ICU team attending, Dr Lin.     The patient is a 48y Male with endocarditis and embolic shower with multiple organ damage including likely stroke.     Stroke.   Likely embolic on basis of endocarditis.   On anticoagulation with heparin for now.   Statin deferred as LDL at goal< 70.  Neuro checks per ICU.  Agree with plan for brain MRI.   Suggest CT-A or MRA brain to assess for mycotic aneurysm.  Neuro/Stroke team to follow.     Vent management per ICU.     Case discussed with ICU team attending, Dr Lin.
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
## 2024-03-05 NOTE — PROGRESS NOTE ADULT - PROBLEM SELECTOR PLAN 1
TTE from [facility] showing Vegetations of Mitral/Tricuspid/Aortic Valves  BCx from [facility] - Preliminary results showing gram + cocci   On Zosyn, Vanco  ID consult  Septic shock requiring pressor support - Levo/Vaso   Recommend Cardiology Consult  Multiple infarcted areas on CT scan from [facility] - renal/splenic/brain  Recommend Neuro consult/Neurosurgical consult    Remaining CTS preop workup ordered    CTS will follow    d/w CTS Attending on call. TTE from [facility] showing Vegetations of Mitral/Tricuspid/Aortic Valves  BCx from [facility] - Enterococcus  On Ceftriaxone, Ampicillin  ID following  Septic shock requiring pressor support - Levo/Vaso   Recommend Cardiology Consult  Multiple infarcted areas on CT scan from [facility] - renal/splenic/brain  Neuro consulted - EEG pending, MRI/MRA/CTA pending for further evaluation of cerebral infarct  Trial off sedation overnight with minimal response per report    Remaining CTS preop workup ordered    Will also likely need Cath when appropriate     CTS will follow, candidacy for surgical intervention to be determined    d/w CTS Attending

## 2024-03-05 NOTE — PROCEDURE NOTE - ADDITIONAL PROCEDURE DETAILS
indications: cardiogenic shock, distributive shock, acute mitral regurgitation, acute infective endocarditis    Arterial line initially attempted by FLAVIA Iglesias in Left axilla. Unable to thread guidewire, manual pressure held until hemostasis was achieved. Distally neurovascularly in tact post-procedurally.  Arterial line then attempted by myself on Right axillary artery, successful on first attempt without complications.
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
## 2024-03-06 NOTE — CONSULT NOTE ADULT - ASSESSMENT
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    # multiple valve endocarditis  - grave prognosis  - cardiac surgery -  not a candiate for surgery  - antibiotics per infectious diseases  - reepat cultures, currently + gram + cocci    # vent dependence  - poor prognosis for meaningful weaning    # Acute kidney injury  - supportive measures  - trend creatinine   - avoid nephrotoxins     # Encounter for palliative care  - spoke to mother Heide about grave prognosis and unlikely that we will be able to restore him to functional or meaningful recovery. I addressed we would keep doing what we are doing for now, but that we should prepare for a much more difficult and serious conversation in the next few days if he does not improve.   - there is a brother Simone as well

## 2024-03-06 NOTE — DIETITIAN INITIAL EVALUATION ADULT - ENTERAL
Consider changing enteral formula to Glucerna 1.5 (for better glucose control) @ 60ml/hr (18 hours) 1080ml/day; 1620kcal, 90gm protein  Propofol providing and additional ~ 500kcal
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
## 2024-03-06 NOTE — PROGRESS NOTE ADULT - CRITICAL CARE ATTENDING COMMENT
Critical care time spent titrating IV sedatives, vasoactive medications, adjusting mechanical ventilator settings, interpreting blood gases and chest imaging.
Critical care time spent titrating IV sedatives, vasoactive medications, adjusting mechanical ventilator settings, interpreting blood gases and chest imaging.

## 2024-03-06 NOTE — PROVIDER CONTACT NOTE (CRITICAL VALUE NOTIFICATION) - NAME OF MD/NP/PA/DO NOTIFIED:
Berenice Hernandez, [facility] PA
Berenice Hernandez, [facility] PA
mirian
FLAVIA Ng

## 2024-03-06 NOTE — CONSULT NOTE ADULT - NS ATTEND AMEND GEN_ALL_CORE FT
Patient noted to be in increased respiratory distress overnight.  Reviewed CT--Stroke appears to be chronic upon my review.      Would recommend however repeat CT given change in exam to r/o swelling related to anoxic brain injury and/or hemorrhage. May require vascular imaging (CTA) prior to anticoagulation initiation (if needed) to r/o mycotic aneurysm.  At this time, no need to perform.

## 2024-03-06 NOTE — PROVIDER CONTACT NOTE (CRITICAL VALUE NOTIFICATION) - TEST AND RESULT REPORTED:
blood cx positive gram pos cocci
Blood Cultures from 3/4/24: growth in anaerobic bottles gram positive cocci in pairs
Blood Cultures from 3/4/24: growth in both anaerobic and aerobic bottles...gram positive cocci in pairs
lactate 4.6

## 2024-03-06 NOTE — DIETITIAN INITIAL EVALUATION ADULT - OTHER INFO
Pt is a 47 yo male currently incarcerated since January, with hx of Crohn's disease, HTN, anxiety, who presented initially to [facility] on 3/3/24 by ambulance with complains of dyspnea for 3 days. While in triage patient became unresponsive and suffered a cardiac arrest, was successfully resuscitated. He was intubated, central lines were placed in the left IJ and right groin, art line in the left groin. Patient found to have mitral, tricuspid, and aortic valve endocarditis, with preserved ejection fraction and severe MR and AoV regurgitation. CTA performed at [facility] showed splenic and renal infarcts, multifocal pneumonia, bilateral effusions, no PE. CT brain was poor quality but revealed a low-density lesion in the left centrum semiovale.  Patient was transferred to the MICU at [facility] on the evening of 3/4/24 accompanied by [facility] for CT surgery evaluation.

## 2024-03-06 NOTE — EEG REPORT - NS EEG TEXT BOX
ELIECER CALDERA [id number]     Study Date: 		03-05-24 14:03 - 08:00 03-06-24  Duration: 18 hours   --------------------------------------------------------------------------------------------------  History:  CC/ HPI Patient is a 48y old  Male who presents with a chief complaint of Endocarditis (06 Mar 2024 07:59)    MEDICATIONS  (STANDING):   ampicillin  IVPB 2 Gram(s) IV Intermittent every 4 hours  cefTRIAXone Injectable. 2000 milliGRAM(s) IV Push every 12 hours  fentaNYL   Infusion. 0.5 MICROgram(s)/kG/Hr (4 mL/Hr) IV Continuous <Continuous>  insulin regular Infusion 8 Unit(s)/Hr (8 mL/Hr) IV Continuous <Continuous>  norepinephrine Infusion 0.05 MICROgram(s)/kG/Min (3.75 mL/Hr) IV Continuous <Continuous>  propofol Infusion 35 MICROgram(s)/kG/Min (16.8 mL/Hr) IV Continuous <Continuous>  vasopressin Infusion 0.04 Unit(s)/Min (6 mL/Hr) IV Continuous <Continuous>    --------------------------------------------------------------------------------------------------  Study Interpretation:    [[[Abbreviation Key:  PDR=alpha rhythm/posterior dominant rhythm. A-P=anterior posterior.  Amplitude: ‘very low’:<20; ‘low’:20-49; ‘medium’:; ‘high’:>150uV.  Persistence for periodic/rhythmic patterns (% of epoch) ‘rare’:<1%; ‘occasional’:1-10%; ‘frequent’:10-50%; ‘abundant’:50-90%; ‘continuous’:>90%.  Persistence for sporadic discharges: ‘rare’:<1/hr; ‘occasional’:1/min-1/hr; ‘frequent’:>1/min; ‘abundant’:>1/10 sec.  RPP=rhythmic and periodic patterns; GRDA=generalized rhythmic delta activity; FIRDA=frontal intermittent GRDA; LRDA=lateralized rhythmic delta activity; TIRDA=temporal intermittent rhythmic delta activity;  LPD=PLED=lateralized periodic discharges; GPD=generalized periodic discharges; BIPDs =bilateral independent periodic discharges; Mf=multifocal; SIRPDs=stimulus induced rhythmic, periodic, or ictal appearing discharges; BIRDs=brief potentially ictal rhythmic discharges >4 Hz, lasting .5-10s; PFA (paroxysmal bursts >13 Hz or =8 Hz <10s).  Modifiers: +F=with fast component; +S=with spike component; +R=with rhythmic component.  S-B=burst suppression pattern.  Max=maximal. N1-drowsy; N2-stage II sleep; N3-slow wave sleep. SSS/BETS=small sharp spikes/benign epileptiform transients of sleep. HV=hyperventilation; PS=photic stimulation]]]    Daily EEG Visual Analysis    FINDINGS:      Background:  Continuity: continuous  Symmetry: symmetric  PDR: 6.5-7Hz activity, with amplitude to 20 uV, that attenuated to eye opening.  Low amplitude frontal beta noted in wakefulness.  Reactivity: present  Voltage: normal, mostly 20-150uV  Anterior Posterior Gradient: present  Other background findings: none  Breach: absent    Background Slowing:  Generalized slowing: diffuse, frontally-predominant, polymorphic delta>theta frequencies, at times occurring in a quasi-rhythmic pattern at 1.5-2Hz in the left>right frontal region.  Focal slowing: None.    State Changes:   -Present with N2 sleep transients with symmetric spindles and K-complexes.    Sporadic Epileptiform Discharges:    None    Rhythmic and Periodic Patterns (RPPs):  None     Electrographic and Electroclinical seizures:  None    Other Clinical Events:  None    Activation Procedures:   -Photic stimulation was performed and did not elicit any abnormalities.    -Hyperventilation was not performed.      Artifacts:  Intermittent myogenic and movement artifacts were noted.  Intermittent O1 lead artifacts    ECG:  The heart rate on single channel ECG was predominantly between xx BPM.    EEG Classification / Summary:  Abnormal EEG in the awake / drowsy / asleep state(s):   frontally predominant, moderate generalized background slowing    Clinical Impression:  ***THIS IS A PRELIMINARY FELLOW REPORT PENDING REVIEW WITH ATTENDING EPILEPTOLOGIST***   The findings of this EEG indicate a moderate nonspecific diffuse disturbance in neuronal function.   There were no epileptiform abnormalities recorded.  Intermittent O1 lead artifacts      Lance Avendano MD  PGY-6, Pediatric Epilepsy Fellow    -------------------------------------------------------------------------------------------------------  [facility] EEG Reading Room Ph#: [phone]  Epilepsy Answering Service after 5PM and before 8:30AM: Ph#: [phone]     ELIECER CALDERA [id number]     Study Date: 		03-05-24 14:03 - 08:00 03-06-24  Duration: 18 hours   --------------------------------------------------------------------------------------------------  History:  CC/ HPI Patient is a 48y old  Male who presents with a chief complaint of Endocarditis (06 Mar 2024 07:59)    MEDICATIONS  (STANDING):   ampicillin  IVPB 2 Gram(s) IV Intermittent every 4 hours  cefTRIAXone Injectable. 2000 milliGRAM(s) IV Push every 12 hours  fentaNYL   Infusion. 0.5 MICROgram(s)/kG/Hr (4 mL/Hr) IV Continuous <Continuous>  insulin regular Infusion 8 Unit(s)/Hr (8 mL/Hr) IV Continuous <Continuous>  norepinephrine Infusion 0.05 MICROgram(s)/kG/Min (3.75 mL/Hr) IV Continuous <Continuous>  propofol Infusion 35 MICROgram(s)/kG/Min (16.8 mL/Hr) IV Continuous <Continuous>  vasopressin Infusion 0.04 Unit(s)/Min (6 mL/Hr) IV Continuous <Continuous>    --------------------------------------------------------------------------------------------------  Study Interpretation:    [[[Abbreviation Key:  PDR=alpha rhythm/posterior dominant rhythm. A-P=anterior posterior.  Amplitude: ‘very low’:<20; ‘low’:20-49; ‘medium’:; ‘high’:>150uV.  Persistence for periodic/rhythmic patterns (% of epoch) ‘rare’:<1%; ‘occasional’:1-10%; ‘frequent’:10-50%; ‘abundant’:50-90%; ‘continuous’:>90%.  Persistence for sporadic discharges: ‘rare’:<1/hr; ‘occasional’:1/min-1/hr; ‘frequent’:>1/min; ‘abundant’:>1/10 sec.  RPP=rhythmic and periodic patterns; GRDA=generalized rhythmic delta activity; FIRDA=frontal intermittent GRDA; LRDA=lateralized rhythmic delta activity; TIRDA=temporal intermittent rhythmic delta activity;  LPD=PLED=lateralized periodic discharges; GPD=generalized periodic discharges; BIPDs =bilateral independent periodic discharges; Mf=multifocal; SIRPDs=stimulus induced rhythmic, periodic, or ictal appearing discharges; BIRDs=brief potentially ictal rhythmic discharges >4 Hz, lasting .5-10s; PFA (paroxysmal bursts >13 Hz or =8 Hz <10s).  Modifiers: +F=with fast component; +S=with spike component; +R=with rhythmic component.  S-B=burst suppression pattern.  Max=maximal. N1-drowsy; N2-stage II sleep; N3-slow wave sleep. SSS/BETS=small sharp spikes/benign epileptiform transients of sleep. HV=hyperventilation; PS=photic stimulation]]]    Daily EEG Visual Analysis    FINDINGS:      Background:  Continuity: continuous  Symmetry: symmetric  PDR: 6.5-7Hz activity, with amplitude to 20 uV, that attenuated to eye opening.  Low amplitude frontal beta noted in wakefulness.  Reactivity: present  Voltage: normal, mostly 20-150uV  Anterior Posterior Gradient: present  Other background findings: none  Breach: absent    Background Slowing:  Generalized slowing: diffuse, asynchronous, frontally-predominant, polymorphic delta>theta frequencies, at times occurring in a quasi-rhythmic pattern at 1.5-2Hz in the left or right frontal region.  Focal slowing: None.    State Changes:   -Present with N2 sleep transients with symmetric spindles and K-complexes.    Sporadic Epileptiform Discharges:    None    Rhythmic and Periodic Patterns (RPPs):  None     Electrographic and Electroclinical seizures:  None    Other Clinical Events:  None    Activation Procedures:   -Photic stimulation was performed and did not elicit any abnormalities.    -Hyperventilation was not performed.      Artifacts:  Intermittent myogenic and movement artifacts were noted.  Intermittent O1 lead artifacts    ECG:  The heart rate on single channel ECG was predominantly between xx BPM.    EEG Classification / Summary:  Abnormal EEG in the awake / drowsy / asleep state(s):   frontally predominant, moderate generalized background slowing    Clinical Impression:    The findings of this EEG indicate a moderate nonspecific diffuse / multifocal disturbance in neuronal function.   There were no epileptiform abnormalities recorded.  Intermittent O1 lead artifacts  No seizures x 1 day(s)    In absence of additional clinical concerns, recommend consideration for discontinuation of current EEG study with reconnection in future if warranted.      Lance Avendano MD  PGY-6, Pediatric Epilepsy Fellow    -------------------------------------------------------------------------------------------------------   EEG Reading Room Ph#: [phone]  Epilepsy Answering Service after 5PM and before 8:30AM: Ph#: [phone]

## 2024-03-06 NOTE — PROGRESS NOTE ADULT - SUBJECTIVE AND OBJECTIVE BOX
INFECTIOUS DISEASES AND INTERNAL MEDICINE at [place]  =======================================================  Merrill Zamora MD  Diplomates American Board of Internal Medicine and Infectious Diseases  Telephone [phone]  Fax            [phone]  =======================================================    ELIECER CALDERA [id number]    Follow up: enterococcal endocarditis    Allergies:  No Known Allergies      Medications:  ampicillin  IVPB      ampicillin  IVPB 2 Gram(s) IV Intermittent every 4 hours  cefTRIAXone Injectable.      cefTRIAXone Injectable. 2000 milliGRAM(s) IV Push every 12 hours  chlorhexidine 0.12% Liquid 15 milliLiter(s) Oral Mucosa every 12 hours  chlorhexidine 2% Cloths 1 Application(s) Topical daily  dextrose 5%. 1000 milliLiter(s) IV Continuous <Continuous>  dextrose 5%. 1000 milliLiter(s) IV Continuous <Continuous>  dextrose 50% Injectable 25 Gram(s) IV Push once  dextrose 50% Injectable 25 Gram(s) IV Push once  dextrose 50% Injectable 12.5 Gram(s) IV Push once  dextrose Oral Gel 15 Gram(s) Oral once PRN  fentaNYL   Infusion. 0.5 MICROgram(s)/kG/Hr IV Continuous <Continuous>  glucagon  Injectable 1 milliGRAM(s) IntraMuscular once  heparin   Injectable 5000 Unit(s) SubCutaneous every 8 hours  HYDROmorphone  Injectable 1 milliGRAM(s) IV Push every 3 hours PRN  insulin regular Infusion 8 Unit(s)/Hr IV Continuous <Continuous>  norepinephrine Infusion 0.05 MICROgram(s)/kG/Min IV Continuous <Continuous>  pantoprazole  Injectable 40 milliGRAM(s) IV Push daily  potassium chloride   Powder 40 milliEquivalent(s) Oral every 4 hours  potassium chloride  20 mEq/100 mL IVPB 20 milliEquivalent(s) IV Intermittent every 2 hours  propofol Infusion 35 MICROgram(s)/kG/Min IV Continuous <Continuous>  sodium chloride 0.9% lock flush 10 milliLiter(s) IV Push every 1 hour PRN  vasopressin Infusion 0.04 Unit(s)/Min IV Continuous <Continuous>    SOCIAL       FAMILY   FAMILY HISTORY:  Family history of diabetes mellitus (DM) (Father)      REVIEW OF SYSTEMS:  unable to obtain           Physical Exam:  ICU Vital Signs Last 24 Hrs  T(C): 36.9 (06 Mar 2024 07:21), Max: 37.2 (05 Mar 2024 20:30)  T(F): 98.4 (06 Mar 2024 07:21), Max: 99 (05 Mar 2024 20:30)  HR: 109 (06 Mar 2024 07:15) (103 - 114)  BP: --  BP(mean): --  ABP: 105/43 (06 Mar 2024 07:15) (102/41 - 134/52)  ABP(mean): 65 (06 Mar 2024 07:15) (61 - 86)  RR: 28 (06 Mar 2024 07:15) (21 - 40)  SpO2: 93% (06 Mar 2024 07:15) (90% - 100%)    O2 Parameters below as of 06 Mar 2024 04:00  Patient On (Oxygen Delivery Method): ventilator    O2 Concentration (%): 50      GEN:  , ONVENT SEDATED  HEENT: normocephalic and atraumatic. EOMI. NATASHA.    NECK: Supple. No carotid bruits.  No lymphadenopathy or thyromegaly.  LUNGS: Clear to auscultation.  HEART: Regular rate and rhythm without murmur.  ABDOMEN: Soft, nontender, and nondistended.  Positive bowel sounds.    : No CVA tenderness  EXTREMITIES: Without any cyanosis, clubbing, rash, lesions or edema. SHACKLE    RIGHT ANKLE  MSK: no joint swelling  NEUROLOGIC: ON VENT SEDATED          Labs:  Vitals:  ============  T(F): 98.4 (06 Mar 2024 07:21), Max: 99 (05 Mar 2024 20:30)  HR: 109 (06 Mar 2024 07:15)  BP: --  RR: 28 (06 Mar 2024 07:15)  SpO2: 93% (06 Mar 2024 07:15) (90% - 100%)  temp max in last 48H T(F): , Max: 100 (03-04-24 @ 17:45)    =======================================================  Current Antibiotics:  ampicillin  IVPB      ampicillin  IVPB 2 Gram(s) IV Intermittent every 4 hours  cefTRIAXone Injectable.      cefTRIAXone Injectable. 2000 milliGRAM(s) IV Push every 12 hours    Other medications:  chlorhexidine 0.12% Liquid 15 milliLiter(s) Oral Mucosa every 12 hours  chlorhexidine 2% Cloths 1 Application(s) Topical daily  dextrose 5%. 1000 milliLiter(s) IV Continuous <Continuous>  dextrose 5%. 1000 milliLiter(s) IV Continuous <Continuous>  dextrose 50% Injectable 25 Gram(s) IV Push once  dextrose 50% Injectable 12.5 Gram(s) IV Push once  dextrose 50% Injectable 25 Gram(s) IV Push once  fentaNYL   Infusion. 0.5 MICROgram(s)/kG/Hr IV Continuous <Continuous>  glucagon  Injectable 1 milliGRAM(s) IntraMuscular once  heparin   Injectable 5000 Unit(s) SubCutaneous every 8 hours  insulin regular Infusion 8 Unit(s)/Hr IV Continuous <Continuous>  norepinephrine Infusion 0.05 MICROgram(s)/kG/Min IV Continuous <Continuous>  pantoprazole  Injectable 40 milliGRAM(s) IV Push daily  potassium chloride   Powder 40 milliEquivalent(s) Oral every 4 hours  potassium chloride  20 mEq/100 mL IVPB 20 milliEquivalent(s) IV Intermittent every 2 hours  propofol Infusion 35 MICROgram(s)/kG/Min IV Continuous <Continuous>  vasopressin Infusion 0.04 Unit(s)/Min IV Continuous <Continuous>      =======================================================  Labs:                        8.3    26.59 )-----------( 361      ( 06 Mar 2024 05:30 )             25.4     03-06    132<L>  |  90<L>  |  47.8<H>  ----------------------------<  455<HH>  3.2<L>   |  27.0  |  1.34<H>    Ca    6.9<L>      06 Mar 2024 05:30  Phos  3.1     03-06  Mg     2.1     03-06    TPro  6.0<L>  /  Alb  2.0<L>  /  TBili  0.7  /  DBili  x   /  AST  996<H>  /  ALT  1378<H>  /  AlkPhos  201<H>  03-06      Culture - Sputum (collected 03-05-24 @ 05:25)  Source: ET Tube ET Tube  Gram Stain (03-05-24 @ 12:06):    No polymorphonuclear leukocytes per low power field    No Squamous epithelial cells per low power field    Rare Yeast like cells per oil power field    Culture - Blood (collected 03-04-24 @ 21:30)  Source: .Blood Blood-Peripheral  Gram Stain (03-06-24 @ 02:35):    Upon re-evaluation of gram stain:    Growth in anaerobic bottle: Gram Positive Cocci in Clusters and Gram    positive cocci in pairs    Previously reported as:    Growth in anaerobic bottle: Gram Positive Cocci in Clusters  Preliminary Report (03-06-24 @ 02:35):    Upon re-evaluation of gram stain:    Growth in anaerobic bottle: Gram Positive Cocci in Clusters and Gram    positive cocci in pairs    Previously reported as:    Growth in anaerobic bottle: Gram Positive Cocci in Clusters    Direct identification is available within approximately 3-5    hours either by Blood Panel Multiplexed PCR or Direct    MALDI-TOF. Details: [email or web address]  Organism: Blood Culture PCR (03-06-24 @ 03:12)  Organism: Blood Culture PCR (03-06-24 @ 03:12)    Sensitivities:      -  Staphylococcus epidermidis, Methicillin resistant: Detec      Method Type: PCR      -  Enterococcus faecalis: Detec    Culture - Urine (collected 03-04-24 @ 20:17)  Source: Catheterized Catheterized  Final Report (03-05-24 @ 23:32):    No growth    Culture - Blood (collected 03-04-24 @ 18:36)  Source: .Blood Blood-Peripheral  Gram Stain (03-05-24 @ 20:12):    Growth in anaerobic bottle: Gram Positive Cocci in Pairs and Chains    Growth in aerobic bottle: Gram Positive Cocci in Pairs and Chains  Preliminary Report (03-05-24 @ 20:13):    Growth in anaerobic bottle: Gram Positive Cocci in Pairs and Chains    Growth in aerobic bottle: Gram Positive Cocci in Pairs and Chains    Direct identification is available within approximately 3-5    hours either by Blood Panel Multiplexed PCR or Direct    MALDI-TOF. Details: [email or web address]  Organism: Blood Culture PCR (03-05-24 @ 20:32)  Organism: Blood Culture PCR (03-05-24 @ 20:32)    Sensitivities:      Method Type: PCR      -  Enterococcus faecalis: Detec    Culture - Blood (collected 03-04-24 @ 18:36)  Source: .Blood Blood-Peripheral  Gram Stain (03-06-24 @ 03:12):    Growth in anaerobic bottle: Gram positive cocci in pairs    Growth in aerobic bottle: Gram positive cocci in pairs  Preliminary Report (03-06-24 @ 03:12):    Growth in anaerobic bottle: Gram positive cocci in pairs    Growth in aerobic bottle: Gram positive cocci in pairs    Culture - Blood (collected 10-07-22 @ 03:40)  Source: .Blood Blood  Final Report (10-12-22 @ 09:00):    No Growth Final    Culture - Blood (collected 10-07-22 @ 03:35)  Source: .Blood Blood  Final Report (10-12-22 @ 09:00):    No Growth Final    Culture - Blood (collected 10-02-22 @ 22:10)  Source: .Blood Blood-Peripheral  Final Report (10-08-22 @ 03:00):    No Growth Final    Culture - Blood (collected 10-02-22 @ 22:10)  Source: .Blood Blood-Peripheral  Final Report (10-08-22 @ 03:00):    No Growth Final      Creatinine: 1.34 mg/dL (03-06-24 @ 05:30)  Creatinine: 1.36 mg/dL (03-06-24 @ 00:05)  Creatinine: 1.37 mg/dL (03-05-24 @ 04:20)  Creatinine: 1.33 mg/dL (03-04-24 @ 23:00)  Creatinine: 1.34 mg/dL (03-04-24 @ 18:08)            WBC Count: 26.59 K/uL (03-06-24 @ 05:30)  WBC Count: 25.99 K/uL (03-05-24 @ 04:20)  WBC Count: 23.80 K/uL (03-04-24 @ 23:00)  WBC Count: 20.71 K/uL (03-04-24 @ 19:57)      Lactate Dehydrogenase, Serum: >2332 U/L (03-04-24 @ 23:00)    Alkaline Phosphatase: 201 U/L (03-06-24 @ 05:30)  Alkaline Phosphatase: 166 U/L (03-05-24 @ 04:20)  Alkaline Phosphatase: 149 U/L (03-04-24 @ 23:00)  Alkaline Phosphatase: 141 U/L (03-04-24 @ 18:08)  Alanine Aminotransferase (ALT/SGPT): 1378 U/L (03-06-24 @ 05:30)  Alanine Aminotransferase (ALT/SGPT): 2378 U/L (03-05-24 @ 04:20)  Alanine Aminotransferase (ALT/SGPT): 2595 U/L (03-04-24 @ 23:00)  Alanine Aminotransferase (ALT/SGPT): 2657 U/L (03-04-24 @ 18:08)  Aspartate Aminotransferase (AST/SGOT): 996 U/L (03-06-24 @ 05:30)  Aspartate Aminotransferase (AST/SGOT): 4360 U/L (03-05-24 @ 04:20)  Aspartate Aminotransferase (AST/SGOT): 5740 U/L (03-04-24 @ 23:00)  Aspartate Aminotransferase (AST/SGOT): >7000 U/L (03-04-24 @ 18:08)  Bilirubin Total: 0.7 mg/dL (03-06-24 @ 05:30)  Bilirubin Total: 1.0 mg/dL (03-05-24 @ 04:20)  Bilirubin Total: 1.1 mg/dL (03-04-24 @ 23:00)  Bilirubin Total: 0.8 mg/dL (03-04-24 @ 18:08)  Bilirubin Direct: 0.5 mg/dL (03-04-24 @ 18:08)

## 2024-03-06 NOTE — CONSULT NOTE ADULT - ASSESSMENT
ASSESSMENT: 49 yo male currently incarcerated, with hx of Crohn's disease, HTN, anxiety, stroke code 2018 for word finding difficulty, LE cellulitis (see pacs/hie) presented initially to [facility] [place] on 3/3/24 by ambulance with complains of dyspnea for 3 days, While in triage patient became unresponsive and suffered a cardiac arrest, was successfully resuscitated.  CT head demonstrated hypodensity in the left centrum semiovale.  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      Impression: Age Indeterminate left centrum semiovale infarction, etiology unclear at this time.  Cardioembolism in the setting of low EF (no obvious thrombus noted at this time) and Endocarditis- septic emboli remains in differential a in the setting of Crohns disease.  Overall, embolic stroke of undetermined source- workup in progress.     NEURO:   -repeat CT head non contrast, MRI brain w/o once able to obtain from stability standpoint, MR angiogram neck w/ contrast, head w/o contrast once stable.  Unable to obtain ct angiogram head/neck per ICU due to EDWIN.   -Continue close monitoring for neurologic deterioration    - Stroke neuro checks q 1 hr    - Normotension as tolerated  - Carotid duplex w/ No significant hemodynamic stenosis of either carotid artery.   -ANTITHROMBOTIC THERAPY: no acute neurological indication at this time, etiology at this time concerning for septic emboli and overall risk>benefit as unclear if underlying mycotic aneurysms exist.   -Dysphagia screen: once stable   -Physical therapy/OT/Speech eval/treatment once stable.   - TTE: 3/4/24CONCLUSIONS: ejection fraction of 35 % by Aguilar's method of disks. The aortic valve mass is suggestive of a vegetation,  moderate size mobile vegetation attached to the anterior mitral valve leaflet, cardiac monitoring w/ telemetry for now, further evaluation pending findings of noted workup           - patient should have all age and risk appropriate malignancy screenings with PCP or sooner if clinically suspected    -DVT ppx  -Vent care per ICU  -Ongoing ICU medical management   -maintain adequate hydration    -Na Goal: 135-145   -ID follow up for appropriate antibiotic regimen   -Stroke education     OTHER:  condition and plan of care d/w ICU team, questions and concerns addressed.     DISPOSITION: Rehab or home depending on PT eval once stable and workup is complete      CORE MEASURES:        Admission NIHSS: 0     Tenecteplase : [] YES [x] NO      LDL/A1C: 24/5.9     Depression Screen- if depression hx and/or present      Statin Therapy: as noted      Dysphagia Screen: when stable      Smoking [] YES [] NO - unknown      Afib [] YES [x] NO     Stroke Education [] YES [] NO pending    ASSESSMENT: 49 yo male currently incarcerated, with hx of Crohn's disease, HTN, anxiety, stroke code 2018 for word finding difficulty, LE cellulitis (see pacs/hie) [place] [facility] [place] by ambulance with complains of dyspnea for 3 days, While in triage patient became unresponsive and suffered a cardiac arrest, was successfully resuscitated.  CT head demonstrated hypodensity in the left centrum semiovale.  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      Impression: Age Indeterminate left centrum semiovale infarction, etiology unclear at this time.  Cardioembolism in the setting of low EF (no obvious thrombus noted at this time) and Endocarditis/bacteremia- septic emboli remains in differential a in the setting of Crohns disease.  Overall, embolic stroke of undetermined source- workup in progress.     NEURO:   -repeat CT head non contrast, MRI brain w/o once able to obtain from stability standpoint, MR angiogram neck w/ contrast, head w/o contrast once stable.  Unable to obtain ct angiogram head/neck per ICU due to EDWIN.   -Continue close monitoring for neurologic deterioration    - Stroke neuro checks q 1 hr    - Normotension as tolerated  - Carotid duplex w/ No significant hemodynamic stenosis of either carotid artery.   -ANTITHROMBOTIC THERAPY: no acute neurological indication at this time, etiology at this time concerning for septic emboli and overall risk>benefit as unclear if underlying mycotic aneurysms exist.   -Dysphagia screen: once stable   -Physical therapy/OT/Speech eval/treatment once stable.   - TTE: 3/4/24CONCLUSIONS: ejection fraction of 35 % by Aguilar's method of disks. The aortic valve mass is suggestive of a vegetation,  moderate size mobile vegetation attached to the anterior mitral valve leaflet, cardiac monitoring w/ telemetry for now, further evaluation pending findings of noted workup           - patient should have all age and risk appropriate malignancy screenings with PCP or sooner if clinically suspected    -DVT ppx  -Vent care per ICU  -Ongoing ICU medical management   -maintain adequate hydration    -Na Goal: 135-145   -ID follow up for appropriate antibiotic regimen   -Stroke education     OTHER:  condition and plan of care d/w ICU team, questions and concerns addressed.     DISPOSITION: Rehab or home depending on PT eval once stable and workup is complete      CORE MEASURES:        Admission NIHSS: 0     Tenecteplase : [] YES [x] NO      LDL/A1C: 24/5.9     Depression Screen- if depression hx and/or present      Statin Therapy: as noted      Dysphagia Screen: when stable      Smoking [] YES [] NO - unknown      Afib [] YES [x] NO     Stroke Education [] YES [] NO pending

## 2024-03-06 NOTE — DIETITIAN INITIAL EVALUATION ADULT - PERTINENT MEDS FT
MEDICATIONS  (STANDING):  ampicillin  IVPB      ampicillin  IVPB 2 Gram(s) IV Intermittent every 4 hours  cefTRIAXone Injectable.      chlorhexidine 0.12% Liquid 15 milliLiter(s) Oral Mucosa every 12 hours  dextrose 50% Injectable 12.5 Gram(s) IV Push once  fentaNYL   Infusion. 0.5 MICROgram(s)/kG/Hr (4 mL/Hr) IV Continuous <Continuous>  glucagon  Injectable 1 milliGRAM(s) IntraMuscular once  heparin   Injectable 5000 Unit(s) SubCutaneous every 8 hours  insulin regular Infusion 8 Unit(s)/Hr (8 mL/Hr) IV Continuous <Continuous>  norepinephrine Infusion 0.05 MICROgram(s)/kG/Min (3.75 mL/Hr) IV Continuous <Continuous>  pantoprazole  Injectable 40 milliGRAM(s) IV Push daily  potassium chloride   Powder 40 milliEquivalent(s) Oral every 4 hours  propofol Infusion 35 MICROgram(s)/kG/Min (16.8 mL/Hr) IV Continuous <Continuous>  vasopressin Infusion 0.04 Unit(s)/Min (6 mL/Hr) IV Continuous <Continuous>    MEDICATIONS  (PRN):  dextrose Oral Gel 15 Gram(s) Oral once PRN Blood Glucose LESS THAN 70 milliGRAM(s)/deciliter  HYDROmorphone  Injectable 1 milliGRAM(s) IV Push every 3 hours PRN pain  sodium chloride 0.9% lock flush 10 milliLiter(s) IV Push every 1 hour PRN Pre/post blood products, medications, blood draw, and to maintain line patency

## 2024-03-06 NOTE — DIETITIAN INITIAL EVALUATION ADULT - NS FNS DIET ORDER
Diet, NPO with Tube Feed:   Tube Feeding Modality: Orogastric  Vital 1.2 David (VITAL1.2)  Total Volume for 24 Hours (mL): 810  Continuous  Starting Tube Feed Rate {mL per Hour}: 10  Increase Tube Feed Rate by (mL): 10     Every 4 hours  Until Goal Tube Feed Rate (mL per Hour): 45  Tube Feed Duration (in Hours): 18  Tube Feed Start Time: 19:00 (03-04-24 @ 22:39)
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
## 2024-03-06 NOTE — DIETITIAN INITIAL EVALUATION ADULT - NSFNSPHYEXAMSKINFT_GEN_A_CORE
Pressure Injury 1: Left:, buttocks, Stage I  Pressure Injury 2: Left:, buttocks, Suspected deep tissue injury  Pressure Injury 3: Right:, buttocks, Suspected deep tissue injury  Pressure Injury 11: none, none, Pressure Injury 1: Left:, upper, buttocks, Stage I  Pressure Injury 2: Left:, buttocks, Suspected deep tissue injury  Pressure Injury 3: Right:, buttocks, Suspected deep tissue injury

## 2024-03-06 NOTE — PROGRESS NOTE ADULT - PROBLEM SELECTOR PLAN 1
TTE from [facility] showing Vegetations of Mitral/Tricuspid/Aortic Valves  BCx from [facility] - Enterococcus  On Ceftriaxone, Ampicillin  ID following  Septic shock requiring pressor support     Multiple infarcted areas on CT scan from [facility] - renal/splenic/brain  Neuro consulted - MRI/MRA/CTA pending for further evaluation of cerebral infarct  Trial off sedation with patient following commands    Pulmonary Edema/ARDS - getting IV diuretics, management per primary team    Remaining CTS preop workup ordered    Will also likely need Cath when appropriate     CTS will follow, candidacy for surgical intervention to be determined, further consideration once cultures clear, respiratory status improves and patient can be extubated    d/w CTS Attending

## 2024-03-06 NOTE — DIETITIAN INITIAL EVALUATION ADULT - PERTINENT LABORATORY DATA
03-06    132<L>  |  90<L>  |  47.8<H>  ----------------------------<  455<HH>  3.2<L>   |  27.0  |  1.34<H>    Ca    6.9<L>      06 Mar 2024 05:30  Phos  3.1     03-06  Mg     2.1     03-06    TPro  6.0<L>  /  Alb  2.0<L>  /  TBili  0.7  /  DBili  x   /  AST  996<H>  /  ALT  1378<H>  /  AlkPhos  201<H>  03-06  POCT Blood Glucose.: 432 mg/dL (03-06-24 @ 08:40)  A1C with Estimated Average Glucose Result: 5.9 % (03-05-24 @ 04:20)

## 2024-03-06 NOTE — CONSULT NOTE ADULT - SUBJECTIVE AND OBJECTIVE BOX
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  norepinephrine Infusion 0.05 MICROgram(s)/kG/Min (3.75 mL/Hr) IV Continuous <Continuous>  pantoprazole  Injectable 40 milliGRAM(s) IV Push daily  potassium chloride   Powder 40 milliEquivalent(s) Oral every 4 hours  potassium chloride  20 mEq/100 mL IVPB 20 milliEquivalent(s) IV Intermittent every 2 hours  propofol Infusion 35 MICROgram(s)/kG/Min (16.8 mL/Hr) IV Continuous <Continuous>  vasopressin Infusion 0.04 Unit(s)/Min (6 mL/Hr) IV Continuous <Continuous>    MEDICATIONS  (PRN):  dextrose Oral Gel 15 Gram(s) Oral once PRN Blood Glucose LESS THAN 70 milliGRAM(s)/deciliter  HYDROmorphone  Injectable 2 milliGRAM(s) IV Push every 2 hours PRN pain or respiratory distress  sodium chloride 0.9% lock flush 10 milliLiter(s) IV Push every 1 hour PRN Pre/post blood products, medications, blood draw, and to maintain line patency    PHYSICAL EXAM:    Vital Signs Last 24 Hrs  T(C): 36.9 (06 Mar 2024 09:00), Max: 37.2 (05 Mar 2024 20:30)  T(F): 98.4 (06 Mar 2024 09:00), Max: 99 (05 Mar 2024 20:30)  HR: 107 (06 Mar 2024 09:00) (103 - 114)  BP: --  BP(mean): --  RR: 28 (06 Mar 2024 09:00) (21 - 40)  SpO2: 91% (06 Mar 2024 09:00) (90% - 100%)    Parameters below as of 06 Mar 2024 08:00  Patient On (Oxygen Delivery Method): ventilator    O2 Concentration (%): 30    General:lethargic     HEENT:  ET tube    Lungs: comfortable     CV: normal      GI: OG tube     : jewell    MSK: weakness     Neuro: no focal deficits    Skin: no rash    LABS:                      8.3    26.59 )-----------( 361      ( 06 Mar 2024 05:30 )             25.4     03-06    132<L>  |  90<L>  |  47.8<H>  ----------------------------<  455<HH>  3.2<L>   |  27.0  |  1.34<H>    Ca    6.9<L>      06 Mar 2024 05:30  Phos  3.1     03-06  Mg     2.1     03-06    TPro  6.0<L>  /  Alb  2.0<L>  /  TBili  0.7  /  DBili  x   /  AST  996<H>  /  ALT  1378<H>  /  AlkPhos  201<H>  03-06    PT/INR - ( 04 Mar 2024 23:00 )   PT: 32.3 sec;   INR: 3.00 ratio      PTT - ( 04 Mar 2024 23:00 )  PTT:27.2 sec  Urinalysis Basic - ( 06 Mar 2024 05:30 )    Color: x / Appearance: x / SG: x / pH: x  Gluc: 455 mg/dL / Ketone: x  / Bili: x / Urobili: x   Blood: x / Protein: x / Nitrite: x   Leuk Esterase: x / RBC: x / WBC x   Sq Epi: x / Non Sq Epi: x / Bacteria: x    I&O's Summary    05 Mar 2024 07:01  -  06 Mar 2024 07:00  --------------------------------------------------------  IN: 2896.5 mL / OUT: 3330 mL / NET: -433.5 mL    06 Mar 2024 07:01  -  06 Mar 2024 09:39  --------------------------------------------------------  IN: 154.7 mL / OUT: 125 mL / NET: 29.7 mL    RADIOLOGY & ADDITIONAL STUDIES:    < from: US Duplex Arterial Lower Ext Ltd, Left (03.05.24 @ 02:51) >    IMPRESSION:    Blunted monophasic waveform in the left dorsalis pedis artery with flow   not well seen; etiology of the poor flow could be embolic given the   history.    The findings were discussed with Dr. Guillen at 4:56 PM on 3/5/2024.    --- End of Report ---      < end of copied text >    < from: TTE W or WO Ultrasound Enhancing Agent (03.04.24 @ 19:33) >  CONCLUSIONS:      1. Left ventricular systolic function is moderately to severely decreased with an ejection fraction of 35 % by Aguilar's method of disks.   2. There is moderate (grade 2) left ventricular diastolic dysfunction.   3. Normal right ventricular cavity size.   4. The aortic valve mass is suggestive of a vegetation.   5. Severe aortic regurgitation.   6. The left atrium is moderately dilated.   7. The right atrium is normal in size.   8. Severe mitral regurgitation.   9. There is a moderate size mobile vegetation attached to the anterior mitral valve leaflet.  10. Mild tricuspid regurgitation.  11. Estimated pulmonary artery systolic pressure is 44 mmHg, consistent with elevated pulmonary artery pressure.  12. Moderate pericardial effusion noted adjacent to the posterior left ventricle, small pericardial effusion noted adjacent to the right atrium and small pericardial effusion noted adjacent to the right ventricle.  13. No prior echocardiogram is available for comparison.    < end of copied text >    RECENT CULTURES:  03-05 @ 05:25 ET Tube ET Tube     Normal Respiratory Flor present    No polymorphonuclear leukocytes per low power field  No Squamous epithelial cells per low power field  Rare Yeast like cells per oil power field      03-04 @ 21:30 .Blood Blood-Peripheral Blood Culture PCR    Upon re-evaluation of gram stain:  Growth in anaerobic bottle: Gram Positive Cocci in Clusters and Gram  positive cocci in pairs  Previously reported as:  Growth in anaerobic bottle: Gram Positive Cocci in Clusters  Growth in aerobic bottle: Gram Positive Cocci in Pairs and Chains  Direct identification is available within approximately 3-5  hours either by Blood Panel Multiplexed PCR or Direct  MALDI-TOF. Details: [email or web address]    Upon re-evaluation of gram stain:  Growth in anaerobic bottle: Gram Positive Cocci in Clusters and Gram  positive cocci in pairs  Previously reported as:  Growth in anaerobic bottle: Gram Positive Cocci in Clusters  Growth in aerobic bottle: Gram Positive Cocci in Pairs and Chains      03-04 @ 20:17 Catheterized Catheterized     No growth        03-04 @ 18:36 .Blood Blood-Peripheral Blood Culture PCR    Growth in anaerobic bottle: Gram Positive Cocci in Pairs and Chains  Growth in aerobic bottle: Gram Positive Cocci in Pairs and Chains  Direct identification is available within approximately 3-5  hours either by Blood Panel Multiplexed PCR or Direct  MALDI-TOF. Details: [email or web address]    Growth in anaerobic bottle: Gram Positive Cocci in Pairs and Chains  Growth in aerobic bottle: Gram Positive Cocci in Pairs and Chains

## 2024-03-06 NOTE — DIETITIAN INITIAL EVALUATION ADULT - NSFNSGIIOFT_GEN_A_CORE
03-05-24 @ 07:01  -  03-06-24 @ 07:00  --------------------------------------------------------  OUT:  Total OUT: 0 mL    Total NET: 830 mL      03-06-24 @ 07:01  -  03-06-24 @ 09:06  --------------------------------------------------------  OUT:  Total OUT: 0 mL    Total NET: 4 mL
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
## 2024-03-07 NOTE — CONSULT NOTE ADULT - SUBJECTIVE AND OBJECTIVE BOX
MEDICATIONS  (STANDING):  ampicillin  IVPB 2 Gram(s) IV Intermittent every 4 hours  ampicillin  IVPB      buMETAnide Infusion 2 mG/Hr (10 mL/Hr) IV Continuous <Continuous>  cefTRIAXone Injectable. 2000 milliGRAM(s) IV Push every 12 hours  cefTRIAXone Injectable.      chlorhexidine 0.12% Liquid 15 milliLiter(s) Oral Mucosa every 12 hours  chlorhexidine 2% Cloths 1 Application(s) Topical daily  cisatracurium Infusion 2 MICROgram(s)/kG/Min (9.6 mL/Hr) IV Continuous <Continuous>  dextrose 5%. 1000 milliLiter(s) (50 mL/Hr) IV Continuous <Continuous>  dextrose 5%. 1000 milliLiter(s) (100 mL/Hr) IV Continuous <Continuous>  dextrose 50% Injectable 25 Gram(s) IV Push once  dextrose 50% Injectable 12.5 Gram(s) IV Push once  dextrose 50% Injectable 25 Gram(s) IV Push once  dextrose Oral Gel 15 Gram(s) Oral once  fentaNYL   Infusion. 1.5 MICROgram(s)/kG/Hr (12 mL/Hr) IV Continuous <Continuous>  glucagon  Injectable 1 milliGRAM(s) IntraMuscular once  heparin   Injectable 5000 Unit(s) SubCutaneous every 8 hours  hydrocortisone sodium succinate Injectable 50 milliGRAM(s) IV Push every 6 hours  insulin lispro (ADMELOG) corrective regimen sliding scale   SubCutaneous every 6 hours  norepinephrine Infusion 0.05 MICROgram(s)/kG/Min (3.75 mL/Hr) IV Continuous <Continuous>  pantoprazole  Injectable 40 milliGRAM(s) IV Push daily  petrolatum Ophthalmic Ointment 1 Application(s) Both EYES every 6 hours  propofol Infusion 40 MICROgram(s)/kG/Min (19.2 mL/Hr) IV Continuous <Continuous>  vasopressin Infusion 0.04 Unit(s)/Min (6 mL/Hr) IV Continuous <Continuous>    MEDICATIONS  (PRN):  dextrose Oral Gel 15 Gram(s) Oral once PRN Blood Glucose LESS THAN 70 milliGRAM(s)/deciliter  HYDROmorphone  Injectable 2 milliGRAM(s) IV Push every 2 hours PRN pain or respiratory distress  sodium chloride 0.9% lock flush 10 milliLiter(s) IV Push every 1 hour PRN Pre/post blood products, medications, blood draw, and to maintain line patency    Vital Signs Last 24 Hrs  T(C): 37.7 (07 Mar 2024 10:00), Max: 38.8 (06 Mar 2024 22:45)  T(F): 99.9 (07 Mar 2024 10:00), Max: 101.8 (06 Mar 2024 22:45)  HR: 121 (07 Mar 2024 10:00) (107 - 140)  BP: --  BP(mean): --  RR: 0 (07 Mar 2024 10:00) (0 - 32)  SpO2: 100% (07 Mar 2024 10:00) (78% - 110%)    Parameters below as of 07 Mar 2024 08:00  Patient On (Oxygen Delivery Method): ventilator    03-07    142  |  101  |  58.9<H>  ----------------------------<  127<H>  6.4<HH>   |  31.0<H>  |  1.74<H>    Ca    7.3<L>      07 Mar 2024 06:36  Phos  9.8     03-07  Mg     2.7     03-07    TPro  6.4<L>  /  Alb  2.2<L>  /  TBili  0.5  /  DBili  0.4<H>  /  AST  425<H>  /  ALT  914<H>  /  AlkPhos  242<H>  03-07                        8.6    36.73 )-----------( 409      ( 07 Mar 2024 02:50 )             29.3    History of present illness: Patient is 48-year-old male currently incarcerated, [facility] Crohn's disease, hypertension initially presented to [facility] with dyspnea for 3 days.  While in triage patient became unresponsive and suffered a cardiac arrest.  Post resuscitation workup showed mitral, tricuspid and aortic valve endocarditis.  CT also showed splenic and renal infarcts, multifocal pneumonia, bilateral effusion.  CT brain revealed a low-density lesion in the left centrum.  Patient transferred to MICU at [facility].  He went into ARDS and yesterday required proning.  Also in shock requiring multiple vasopressors. Labs consistent with refractory hyperkalemia and he is becoming anuric now prompting nephrology consultation.    ROS: Unable to obtain secondary to patient current clinical condition.   Past medical and surgical history: Crohn's disease, GERD, anxiety, close femur fracture  Allergies: NKDA  Family history: Unable to obtain secondary to patient current clinical condition.   Social history: Unable to obtain secondary to patient current clinical condition.     Physical Examination:  Gen: ill appearing, intubated  MS: sedated  HENT: ETT+  CV: rhythm reg reg, rate normal, no m/g/r, no LE edema  Chest: Coarse/Dec BS   Abd: soft, non distended  Neuro: sedated, no myoclonus  Skin: dry, warm, no rash or jaundice  Amor+, Central line    MEDICATIONS  (STANDING):  ampicillin  IVPB 2 Gram(s) IV Intermittent every 4 hours  ampicillin  IVPB      buMETAnide Infusion 2 mG/Hr (10 mL/Hr) IV Continuous <Continuous>  cefTRIAXone Injectable. 2000 milliGRAM(s) IV Push every 12 hours  cefTRIAXone Injectable.      chlorhexidine 0.12% Liquid 15 milliLiter(s) Oral Mucosa every 12 hours  chlorhexidine 2% Cloths 1 Application(s) Topical daily  cisatracurium Infusion 2 MICROgram(s)/kG/Min (9.6 mL/Hr) IV Continuous <Continuous>  dextrose 5%. 1000 milliLiter(s) (50 mL/Hr) IV Continuous <Continuous>  dextrose 5%. 1000 milliLiter(s) (100 mL/Hr) IV Continuous <Continuous>  dextrose 50% Injectable 25 Gram(s) IV Push once  dextrose 50% Injectable 12.5 Gram(s) IV Push once  dextrose 50% Injectable 25 Gram(s) IV Push once  dextrose Oral Gel 15 Gram(s) Oral once  fentaNYL   Infusion. 1.5 MICROgram(s)/kG/Hr (12 mL/Hr) IV Continuous <Continuous>  glucagon  Injectable 1 milliGRAM(s) IntraMuscular once  heparin   Injectable 5000 Unit(s) SubCutaneous every 8 hours  hydrocortisone sodium succinate Injectable 50 milliGRAM(s) IV Push every 6 hours  insulin lispro (ADMELOG) corrective regimen sliding scale   SubCutaneous every 6 hours  norepinephrine Infusion 0.05 MICROgram(s)/kG/Min (3.75 mL/Hr) IV Continuous <Continuous>  pantoprazole  Injectable 40 milliGRAM(s) IV Push daily  petrolatum Ophthalmic Ointment 1 Application(s) Both EYES every 6 hours  propofol Infusion 40 MICROgram(s)/kG/Min (19.2 mL/Hr) IV Continuous <Continuous>  vasopressin Infusion 0.04 Unit(s)/Min (6 mL/Hr) IV Continuous <Continuous>    MEDICATIONS  (PRN):  dextrose Oral Gel 15 Gram(s) Oral once PRN Blood Glucose LESS THAN 70 milliGRAM(s)/deciliter  HYDROmorphone  Injectable 2 milliGRAM(s) IV Push every 2 hours PRN pain or respiratory distress  sodium chloride 0.9% lock flush 10 milliLiter(s) IV Push every 1 hour PRN Pre/post blood products, medications, blood draw, and to maintain line patency    Vital Signs Last 24 Hrs  T(C): 37.7 (07 Mar 2024 10:00), Max: 38.8 (06 Mar 2024 22:45)  T(F): 99.9 (07 Mar 2024 10:00), Max: 101.8 (06 Mar 2024 22:45)  HR: 121 (07 Mar 2024 10:00) (107 - 140)  BP: --  RR: 0 (07 Mar 2024 10:00) (0 - 32)  SpO2: 100% (07 Mar 2024 10:00) (78% - 110%)    Parameters below as of 07 Mar 2024 08:00  Patient On (Oxygen Delivery Method): ventilator    03-07    142  |  101  |  58.9<H>  ----------------------------<  127<H>  6.4<HH>   |  31.0<H>  |  1.74<H>    Ca    7.3<L>      07 Mar 2024 06:36  Phos  9.8     03-07  Mg     2.7     03-07    TPro  6.4<L>  /  Alb  2.2<L>  /  TBili  0.5  /  DBili  0.4<H>  /  AST  425<H>  /  ALT  914<H>  /  AlkPhos  242<H>  03-07                        8.6    36.73 )-----------( 409      ( 07 Mar 2024 02:50 )             29.3

## 2024-03-07 NOTE — GOALS OF CARE CONVERSATION - ADVANCED CARE PLANNING - CONVERSATION DETAILS
further conversation held with mother Heide, as patient had a cardiac arrest and is now on 3 max doses of vasopressor medications. Dr. Madison part of conversation as well. Expressed that despite best efforts he is in active dying stages at this time and further CPR will not be therapeutic. Encouraging calling all the family. She was in the middle of doing this, when patient began to become hemodynamically unstable and almost coding again, walked her to bedside to be with her son while he is dying. do not resuscitate and no further artifical medications, allow a natural death with mom at bedside. Son said goodbyes and did not wish to stay at bedside

## 2024-03-07 NOTE — CONSULT NOTE ADULT - CONSULT REQUESTED DATE/TIME
05-Mar-2024
06-Mar-2024 12:37
04-Mar-2024 18:11
06-Mar-2024 09:38
05-Mar-2024 13:16
07-Mar-2024 10:41

## 2024-03-07 NOTE — CONSULT NOTE ADULT - CONSULT REASON
stroke
" endocarditis of multiple valves, shock, not a surgical candidate"
Stroke evaluation
EDWIN, hyperkalemia
ENTEROCOCCAL ENDOCARDITIS
Endocarditis

## 2024-03-07 NOTE — PHARMACOTHERAPY INTERVENTION NOTE - COMMENTS
on CRRT
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 Recommended ampicillin 2 grams every 4 hours and ceftriaxone 2 grams every 12 hours.

## 2024-03-07 NOTE — PROGRESS NOTE ADULT - CONVERSATION DETAILS
Discussed overall medical condition, prognosis, and options for plan of care with mother, and patients son Darryl at bedside along with Brenda PEDERSON . We addressed worsening condition to the point of requiring initiation of dilaysis. We discussed concerns of hos his body can tolerate these things. We addressed that he is already on artifical medicaitons called vasopressors that are helping his heart p[ump and should he have another cardiac arrest in this state, it is unlikely that CPR will be beneficial. Mom understands this but still wants to try CPR at this time. We addressed that if he worsens, we will readdress this, particularly once all his famiyl has had a chance to come see him ( brother is coming today). Discussed overall medical condition, prognosis, and options for plan of care with mother, and patients son Darryl at bedside along with Brenda PEDERSON . We addressed worsening condition to the point of requiring initiation of dialysis. We discussed concerns of hos his body can tolerate these things. We addressed that he is already on artifical medications called vasopressors that are helping his heart p[ump and should he have another cardiac arrest in this state, it is unlikely that CPR will be beneficial. Mom understands this but still wants to try CPR at this time. We addressed that if he worsens, we will readdress this, particularly once all his family has had a chance to come see him ( brother is coming today). Rex Andrews currently deferring all decisions ot mother Heide.

## 2024-03-07 NOTE — PROGRESS NOTE ADULT - REASON FOR ADMISSION
Endocarditis

## 2024-03-07 NOTE — CONSULT NOTE ADULT - ASSESSMENT
EDWIN, ARDS, refractory hyperkalemia    -Recommend initiation of CRRT  -consent obtained and placed in chart  -D/w Dr Mathur 48-year-old male admitted postcardiac arrest, found to have 3 valve endocarditis, multifocal pneumonia currently in septic shock.  Nephrology consulted for refractory hyperkalemia and EDWIN    Acute kidney injury  Refractory hyperkalemia  Acute hypoxemic respiratory failure/ARDS  3 valve endocarditis  Multifocal pneumonia  Septic shock    ·	Discussed case with ICU team  ·	Recommend initiation of CRRT given his current clinical condition and refractory hyperkalemia  ·	Prognosis extremely guarded  ·	Discussed with mother and son at bedside.  -D/w Dr Mathur

## 2024-03-07 NOTE — EEG REPORT - NS EEG TEXT BOX
ELIECER CALDERA [id number]     Study Date: 		03-06-24 08:00 - 08:00 03-07-24  Duration: 24 hours   --------------------------------------------------------------------------------------------------  History:  CC/ HPI Patient is a 48y old  Male who presents with a chief complaint of Endocarditis (06 Mar 2024 07:59)    MEDICATIONS  (STANDING):  ampicillin  IVPB 2 Gram(s) IV Intermittent every 4 hours  buMETAnide Infusion 2 mG/Hr (10 mL/Hr) IV Continuous <Continuous>  cefTRIAXone Injectable. 2000 milliGRAM(s) IV Push every 12 hours  cisatracurium Infusion 2 MICROgram(s)/kG/Min (9.6 mL/Hr) IV Continuous <Continuous>  fentaNYL   Infusion. 1.5 MICROgram(s)/kG/Hr (12 mL/Hr) IV Continuous <Continuous>  hydrocortisone sodium succinate Injectable 50 milliGRAM(s) IV Push every 6 hours  norepinephrine Infusion 0.05 MICROgram(s)/kG/Min (3.75 mL/Hr) IV Continuous <Continuous>  propofol Infusion 40 MICROgram(s)/kG/Min (19.2 mL/Hr) IV Continuous <Continuous>  vasopressin Infusion 0.04 Unit(s)/Min (6 mL/Hr) IV Continuous <Continuous>      --------------------------------------------------------------------------------------------------  Study Interpretation:    [[[Abbreviation Key:  PDR=alpha rhythm/posterior dominant rhythm. A-P=anterior posterior.  Amplitude: ‘very low’:<20; ‘low’:20-49; ‘medium’:; ‘high’:>150uV.  Persistence for periodic/rhythmic patterns (% of epoch) ‘rare’:<1%; ‘occasional’:1-10%; ‘frequent’:10-50%; ‘abundant’:50-90%; ‘continuous’:>90%.  Persistence for sporadic discharges: ‘rare’:<1/hr; ‘occasional’:1/min-1/hr; ‘frequent’:>1/min; ‘abundant’:>1/10 sec.  RPP=rhythmic and periodic patterns; GRDA=generalized rhythmic delta activity; FIRDA=frontal intermittent GRDA; LRDA=lateralized rhythmic delta activity; TIRDA=temporal intermittent rhythmic delta activity;  LPD=PLED=lateralized periodic discharges; GPD=generalized periodic discharges; BIPDs =bilateral independent periodic discharges; Mf=multifocal; SIRPDs=stimulus induced rhythmic, periodic, or ictal appearing discharges; BIRDs=brief potentially ictal rhythmic discharges >4 Hz, lasting .5-10s; PFA (paroxysmal bursts >13 Hz or =8 Hz <10s).  Modifiers: +F=with fast component; +S=with spike component; +R=with rhythmic component.  S-B=burst suppression pattern.  Max=maximal. N1-drowsy; N2-stage II sleep; N3-slow wave sleep. SSS/BETS=small sharp spikes/benign epileptiform transients of sleep. HV=hyperventilation; PS=photic stimulation]]]    Daily EEG Visual Analysis    FINDINGS:      Background:  Continuity: discontinuous  Symmetry: symmetric  PDR: no clear posterior dominant rhythm  Reactivity: absent  Voltage: low that progressed to suppressed (<10microV)  Anterior Posterior Gradient: absent  Other background findings: none  Breach: absent    Background Slowing:  Generalized slowing: Diffuse, asynchronous, frontally-predominant, polymorphic delta>theta frequencies that progressed to a burst suppression pattern, with 17-30s interburst intervals. Bursts were characterized by sharply contoured theta-alpha frequencies lasting 1-2 seconds. There was a period of marked suppression between bursts from 01:42 - 03:37.   Focal slowing: None.    State Changes:   -Absent    Sporadic Epileptiform Discharges:    None    Rhythmic and Periodic Patterns (RPPs):  None     Electrographic and Electroclinical seizures:  None    Other Clinical Events:  None    Activation Procedures:   -Photic stimulation was performed and did not elicit any abnormalities.    -Hyperventilation was not performed.      Artifacts:  Intermittent myogenic and movement artifacts were noted.  Intermittent O1 lead artifacts    ECG:  The heart rate on single channel ECG was predominantly between xx BPM.    EEG Classification / Summary:  Abnormal EEG due to the following finding:   Burst suppression pattern, IBI 17-30s,    Clinical Impression: ***THIS IS A PRELIMINARY FELLOW REPORT PENDING REVIEW WITH ATTENDING EPILEPTOLOGIST***  The findings of this EEG indicate a severe nonspecific diffuse / multifocal disturbance in neuronal function.   There were no epileptiform abnormalities recorded.  Intermittent O1 lead artifacts  No seizures x 2 days  In absence of additional clinical concerns, recommend consideration for discontinuation of current EEG study with reconnection in future if warranted.  Lance Avendano MD  PGY-6, Pediatric Epilepsy Fellow    -------------------------------------------------------------------------------------------------------  [facility] EEG Reading Room Ph#: [phone]  Epilepsy Answering Service after 5PM and before 8:30AM: Ph#: [phone]     ELIECER CALDERA [id number]     Study Date: 		03-06-24 08:00 - 08:00 03-07-24  Duration: 24 hours   --------------------------------------------------------------------------------------------------  History:  CC/ HPI Patient is a 48y old  Male who presents with a chief complaint of Endocarditis (06 Mar 2024 07:59)    MEDICATIONS  (STANDING):  ampicillin  IVPB 2 Gram(s) IV Intermittent every 4 hours  buMETAnide Infusion 2 mG/Hr (10 mL/Hr) IV Continuous <Continuous>  cefTRIAXone Injectable. 2000 milliGRAM(s) IV Push every 12 hours  cisatracurium Infusion 2 MICROgram(s)/kG/Min (9.6 mL/Hr) IV Continuous <Continuous>  fentaNYL   Infusion. 1.5 MICROgram(s)/kG/Hr (12 mL/Hr) IV Continuous <Continuous>  hydrocortisone sodium succinate Injectable 50 milliGRAM(s) IV Push every 6 hours  norepinephrine Infusion 0.05 MICROgram(s)/kG/Min (3.75 mL/Hr) IV Continuous <Continuous>  propofol Infusion 40 MICROgram(s)/kG/Min (19.2 mL/Hr) IV Continuous <Continuous>  vasopressin Infusion 0.04 Unit(s)/Min (6 mL/Hr) IV Continuous <Continuous>      --------------------------------------------------------------------------------------------------  Study Interpretation:    [[[Abbreviation Key:  PDR=alpha rhythm/posterior dominant rhythm. A-P=anterior posterior.  Amplitude: ‘very low’:<20; ‘low’:20-49; ‘medium’:; ‘high’:>150uV.  Persistence for periodic/rhythmic patterns (% of epoch) ‘rare’:<1%; ‘occasional’:1-10%; ‘frequent’:10-50%; ‘abundant’:50-90%; ‘continuous’:>90%.  Persistence for sporadic discharges: ‘rare’:<1/hr; ‘occasional’:1/min-1/hr; ‘frequent’:>1/min; ‘abundant’:>1/10 sec.  RPP=rhythmic and periodic patterns; GRDA=generalized rhythmic delta activity; FIRDA=frontal intermittent GRDA; LRDA=lateralized rhythmic delta activity; TIRDA=temporal intermittent rhythmic delta activity;  LPD=PLED=lateralized periodic discharges; GPD=generalized periodic discharges; BIPDs =bilateral independent periodic discharges; Mf=multifocal; SIRPDs=stimulus induced rhythmic, periodic, or ictal appearing discharges; BIRDs=brief potentially ictal rhythmic discharges >4 Hz, lasting .5-10s; PFA (paroxysmal bursts >13 Hz or =8 Hz <10s).  Modifiers: +F=with fast component; +S=with spike component; +R=with rhythmic component.  S-B=burst suppression pattern.  Max=maximal. N1-drowsy; N2-stage II sleep; N3-slow wave sleep. SSS/BETS=small sharp spikes/benign epileptiform transients of sleep. HV=hyperventilation; PS=photic stimulation]]]    Daily EEG Visual Analysis    FINDINGS:      Background:  Continuity: discontinuous  Symmetry: symmetric  PDR: no clear posterior dominant rhythm  Reactivity: absent  Voltage: low that progressed to suppressed (<10microV)  Anterior Posterior Gradient: absent  Other background findings: none  Breach: absent    Background Slowing:  Generalized slowing: Diffuse, asynchronous, frontally-predominant, polymorphic delta>theta frequencies that progressed to a burst suppression pattern, with 17-30s interburst intervals. Bursts were characterized by sharply contoured theta-alpha frequencies lasting 1-2 seconds.   There was a period of marked suppression between 01:42 - 03:37.   Focal slowing: None.    State Changes:   -Absent    Sporadic Epileptiform Discharges:    None    Rhythmic and Periodic Patterns (RPPs):  None     Electrographic and Electroclinical seizures:  None    Other Clinical Events:  None    Activation Procedures:   -Photic stimulation was performed and did not elicit any abnormalities.    -Hyperventilation was not performed.      Artifacts:  Intermittent myogenic and movement artifacts were noted.  Intermittent O1 lead artifacts    ECG:  The heart rate on single channel ECG was predominantly between xx BPM.    EEG Classification / Summary:  Abnormal EEG due to the following finding:   Burst suppression pattern  Periods of marked suppression noted between 01:42 - 03:37. and after 07:00    Clinical Impression:    The findings of this EEG indicate a severe nonspecific diffuse  cerebral dysfunction. Some notable intermittent epochs of more severe attenuation including near end of study.  There were no epileptiform abnormalities recorded.  Intermittent O1 lead artifacts    No seizures x 2 days  In absence of additional clinical concerns, recommend consideration for discontinuation of current EEG study with reconnection in future if warranted.    Lance Avendano MD  PGY-6, Pediatric Epilepsy Fellow    -------------------------------------------------------------------------------------------------------  [facility] EEG Reading Room Ph#: [phone]  Epilepsy Answering Service after 5PM and before 8:30AM: Ph#: [phone]     ELIECER CALDERA [id number]     Study Date: 		03-06-24 08:00 - 12:32 03-07-24  Duration: [id number]   --------------------------------------------------------------------------------------------------  History:  CC/ HPI Patient is a 48y old  Male who presents with a chief complaint of Endocarditis (06 Mar 2024 07:59)    MEDICATIONS  (STANDING):  ampicillin  IVPB 2 Gram(s) IV Intermittent every 4 hours  buMETAnide Infusion 2 mG/Hr (10 mL/Hr) IV Continuous <Continuous>  cefTRIAXone Injectable. 2000 milliGRAM(s) IV Push every 12 hours  cisatracurium Infusion 2 MICROgram(s)/kG/Min (9.6 mL/Hr) IV Continuous <Continuous>  fentaNYL   Infusion. 1.5 MICROgram(s)/kG/Hr (12 mL/Hr) IV Continuous <Continuous>  hydrocortisone sodium succinate Injectable 50 milliGRAM(s) IV Push every 6 hours  norepinephrine Infusion 0.05 MICROgram(s)/kG/Min (3.75 mL/Hr) IV Continuous <Continuous>  propofol Infusion 40 MICROgram(s)/kG/Min (19.2 mL/Hr) IV Continuous <Continuous>  vasopressin Infusion 0.04 Unit(s)/Min (6 mL/Hr) IV Continuous <Continuous>      --------------------------------------------------------------------------------------------------  Study Interpretation:    [[[Abbreviation Key:  PDR=alpha rhythm/posterior dominant rhythm. A-P=anterior posterior.  Amplitude: ‘very low’:<20; ‘low’:20-49; ‘medium’:; ‘high’:>150uV.  Persistence for periodic/rhythmic patterns (% of epoch) ‘rare’:<1%; ‘occasional’:1-10%; ‘frequent’:10-50%; ‘abundant’:50-90%; ‘continuous’:>90%.  Persistence for sporadic discharges: ‘rare’:<1/hr; ‘occasional’:1/min-1/hr; ‘frequent’:>1/min; ‘abundant’:>1/10 sec.  RPP=rhythmic and periodic patterns; GRDA=generalized rhythmic delta activity; FIRDA=frontal intermittent GRDA; LRDA=lateralized rhythmic delta activity; TIRDA=temporal intermittent rhythmic delta activity;  LPD=PLED=lateralized periodic discharges; GPD=generalized periodic discharges; BIPDs =bilateral independent periodic discharges; Mf=multifocal; SIRPDs=stimulus induced rhythmic, periodic, or ictal appearing discharges; BIRDs=brief potentially ictal rhythmic discharges >4 Hz, lasting .5-10s; PFA (paroxysmal bursts >13 Hz or =8 Hz <10s).  Modifiers: +F=with fast component; +S=with spike component; +R=with rhythmic component.  S-B=burst suppression pattern.  Max=maximal. N1-drowsy; N2-stage II sleep; N3-slow wave sleep. SSS/BETS=small sharp spikes/benign epileptiform transients of sleep. HV=hyperventilation; PS=photic stimulation]]]    Daily EEG Visual Analysis    FINDINGS:      Background:  Continuity: discontinuous  Symmetry: symmetric  PDR: no clear posterior dominant rhythm  Reactivity: absent  Voltage: low that progressed to suppressed (<10microV)  Anterior Posterior Gradient: absent  Other background findings: none  Breach: absent    Background Slowing:  Generalized slowing: Diffuse, asynchronous, frontally-predominant, polymorphic delta>theta frequencies that progressed to a burst suppression pattern, with 17-30s interburst intervals. Bursts were characterized by sharply contoured theta-alpha frequencies lasting 1-2 seconds.   There was a period of marked suppression between 01:42 - 03:37.   Focal slowing: None.    State Changes:   -Absent    Sporadic Epileptiform Discharges:    None    Rhythmic and Periodic Patterns (RPPs):  None     Electrographic and Electroclinical seizures:  None    Other Clinical Events:  None    Activation Procedures:   -Photic stimulation was performed and did not elicit any abnormalities.    -Hyperventilation was not performed.      Artifacts:  Intermittent myogenic and movement artifacts were noted.  Intermittent O1 lead artifacts    ECG:  The heart rate on single channel ECG was predominantly between xx BPM.    EEG Classification / Summary:  Abnormal EEG due to the following finding:   Burst suppression pattern  Periods of marked suppression noted between 01:42 - 03:37. and after 07:00    Clinical Impression:    The findings of this EEG indicate a severe nonspecific diffuse  cerebral dysfunction. Some notable intermittent epochs of more severe attenuation including near end of study.  There were no epileptiform abnormalities recorded.  Intermittent O1 lead artifacts    No seizures x 2 days  In absence of additional clinical concerns, recommend consideration for discontinuation of current EEG study with reconnection in future if warranted.    Lance Avendano MD  PGY-6, Pediatric Epilepsy Fellow    -------------------------------------------------------------------------------------------------------  [facility] EEG Reading Room Ph#: [phone]  Epilepsy Answering Service after 5PM and before 8:30AM: Ph#: [phone]
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
## 2024-03-07 NOTE — PROGRESS NOTE ADULT - PROVIDER SPECIALTY LIST ADULT
Critical Care
Palliative Care
Infectious Disease
MICU
CT Surgery
CT Surgery
MICU

## 2024-03-07 NOTE — DISCHARGE NOTE FOR THE EXPIRED PATIENT - OTHER SIGNIFICANT FINDINGS
Gen: intubated, unresponsive   HEENT: NC/AT, EEG in place, pupils fixed and dilated   CV: absent heart sounds   Pulm: no spontaneous chest rise noted   GI: soft, nondistended   Ext: no peripheral edema         CRITICAL CARE TIME SPENT: 110 minutes spent performing frequent bedside reassessments and augmenting plan of care to address problems of acute critical illness that pose high probability of life threatening deterioration and/or end organ damage/dysfunction and discussing goals of care, non-inclusive of time spent on procedures performed. Critical care time spent titrating vent settings, interpreting blood gases, titration of pressors, facilitating supination after proning for severe ARDS, facilitating cardiopulmonary resuscitation, goals of care discussion, management of refractory hyperkalemia

## 2024-03-07 NOTE — PROGRESS NOTE ADULT - ASSESSMENT
49 yo male currently incarcerated, with hx of Crohn's disease, HTN, anxiety, presented initially to [facility] on 3/3/24 by ambulance with complains of dyspnea for 3 days, While in triage patient became unresponsive and suffered a cardiac arrest, was successfully resuscitated.  He was intubated, central lines were placed in the left IJ and right groin, art line in the left groin.  Patient found to have mitral, tricuspid, and aortic valve endocarditis, CTA performed at [facility] showed splenic and renal infarcts, multifocal pneumonia, bilateral effusions, no PE,  CT brain was poor quality but revealed a low-density lesion in the left centrum semiovale.  Patient was transferred to the MICU at [facility] on the evening of 3/4/24 accompanied by [facility].   PT WITH POS BLOOD CX ENTEROCOCCUS PLACED ON AMP /ROCEPHIN   OVERALL PT IS INTUBATED MINIMALLY RESPONSIVE  CONTINUE AMPICILLIIN /ROCEPHIN  REPEAT BLOOD CX PENIDNG TODAY   WILL FOLLOWUP  IT APPEARS PT IS HIGH RISK FOR SURGERY  WILL CONTINUE PRESENT IV ABX THERAPY  PROGNOSIS GUARDED            
49 yo male with Crohns disease, HTN, IV drug abuse, presented to [facility] from custodial with shortness of breath, suffering a brief cardiac arrest in triage. Now admitted with enterococcus bacteremia, mitral/tricuspid/aortic valve endocarditis, acute respiratory failure, multifocal pneumonia, EDWIN, splenic and renal infarcts due to septic emboli, possible stroke.      Plan    enterococcus endocarditis  - currently on CTX and ampicillin   - repeat cultures until clearance  - cardiac surgery following, no plans for surgery     Acute systolic CHF/ acute mitral regurgitation   - severe MR now is significant issue, will attempt diuresis, unable to administer afterload reduction due to hypotension  - GDMT when off pressors  - eventual LHC/RHC if organ failure resolves    EDWIN  - making urine, no immediate need for dialysis    Acute respiratory failure/ pneumonia / ARDS  - minimal fio2 requirements, unable to extubate due to mental status  - bilat opacities may also be due to noncardiogenic edema (ARDS), however pulmonary edema still highly likely  - low Vt ventilation  may need proning if worsens     hyperglycemia  - insulin infusion     Sedation/Analgesia: propofol, fent  Vasoactive medications: levo, vaso  DVT prophylaxis: sc heparin  GI prophylaxis: ppi  Nutrition: glucerna  Central line: left IJ  Amor: present  Mobility: bedrest  Family/Patient engagement/GOC: family updated daily  police at bedside, ankle shackle present 
HPI 47 y/o m with pmhx (per report) of HTN, anxiety and Crohn's disease who presented to [facility] c/o several days of worsening SOB and headache. BIBA from correction where he is a prisoner, to [facility] and shortly after arrival patient went into cardiac arrest. ACLS initiated and ROSC was obtained. Patient was intubated and admitted to ICU. Workup revealed concern for Multi-valve Endocarditis with vegetations on Mitral, Tricuspid and Aortic valves. Blood cultures obtained and patient started on empiric ABX. CT scan with concern for infarct - renal, splenic, and brain. Patient transferred to [facility] for further evaluation and management. Patient currently remains Intubated, sedated, on IV pressors. CTS consulted for eval of Endocarditis
49 yo male with Crohns disease, HTN, IV drug abuse, presented to [facility] from halfway with shortness of breath, suffering a brief cardiac arrest in triage. Now admitted with enterococcus bacteremia, mitral/tricuspid/aortic valve endocarditis, acute respiratory failure, multifocal pneumonia, EDWIN, splenic and renal infarcts due to septic emboli, possible stroke.      Plan    Multi-valve endocarditis  - currently on CTX and ampicillin   - repeat cultures until clearance  - ID to follow  - cardiac surgery following, no plans for surgery     Acute systolic CHF/ acute mitral regurgitation   - GDMT when off pressors  - eventual LHC/RHC if organ failure resolves    EDWIN  - making urine, no immediate need for dialysis    Acute respiratory failure/ pneumonia   - minimal fio2 requirements, unable to extubate due to mental status    Sedation/Analgesia: propofol, prn dilaudid   Vasoactive medications: norepi, vaso  DVT prophylaxis: sc heparin  GI prophylaxis: pronotix  Nutrition: tube feeds  Central line: L IJ TLC  Amor: present  Mobility: bedrest  Family/Patient engagement/GOC: updated mother by phone, police to determine if shes allowed to visit  
HPI 49 y/o m with pmhx (per report) of HTN, anxiety and Crohn's disease who presented to [facility] c/o several days of worsening SOB and headache. BIBA from care home where he is a prisoner, to [facility] and shortly after arrival patient went into cardiac arrest. ACLS initiated and ROSC was obtained. Patient was intubated and admitted to ICU. Workup revealed concern for Multi-valve Endocarditis with vegetations on Mitral, Tricuspid and Aortic valves. Blood cultures obtained and patient started on empiric ABX. CT scan with concern for infarct - renal, splenic, and brain. Patient transferred to [facility] for further evaluation and management. Patient currently remains Intubated, sedated, on IV pressors. CTS consulted for eval of Endocarditis
47 yo male currently incarcerated since January, with hx of Crohn's disease, HTN, anxiety, who presented initially to [facility] on 3/3/24 by ambulance with complains of dyspnea for 3 days. While in triage patient became unresponsive and suffered a cardiac arrest, was successfully resuscitated. He was intubated, central lines were placed in the left IJ and right groin, art line in the left groin. Patient found to have mitral, tricuspid, and aortic valve endocarditis, with preserved ejection fraction and severe MR and AoV regurgitation. CTA performed at [facility] showed splenic and renal infarcts, multifocal pneumonia, bilateral effusions, no PE. CT brain was poor quality but revealed a low-density lesion in the left centrum semiovale.  Patient was transferred to the MICU at [facility] on the evening of 3/4/24 accompanied by [facility] for CT surgery evaluation.    Neuro: Deeply sedated on Propofol, modified SAT overnight performed able to follow commands. Added Fentanyl gtt for vent desynchrony. CT head concerning for an acute left sided infarct, likely septic in etiology though cannot exclude sequela of anoxic brain injury. Will eventually require additional imaging, however is not stable at present time. cvEEG without seizures. Neurology consult appreciated.  CV: Shock state likely multifactorial due primarily to cardiogenic shock with a mixed component of distributive shock. Formal TTE was obtained which confirmed acutely reduced EF in the face of severe MR / AR. Titrating Norepinephrine gtt to targeted MAP goal >65, continuing fixed dose physiologic vasopressin. Bumex gtt stopped due to refractory hypokalemia. Stress steroids held due to profound hyperglycemia.  Pulm: Full vent support, tachypneic with RR high 30's ABG with respiratory alkalosis, deepened sedation. EtCO2 monitoring ongoing. FiO2 requirements minimal.  GI: Change TF to glucerna. Transaminitis improving.  Renal: Ischemic ATN, renal indices lateral will monitor UOP off diuretics.  ID: Blood cultures from [facility] and upon transfer to [facility] growing Enterococcus faecalis, sensitivities still pending. BCX 3/4 growing MRSE suspected contaminant. Repeat BCx sent 3/6. Continue antibiotics with Ceftriaxone/Ampicillin pending sensitivities.  Heme: Heparin SC for DVT ppx  Endo: Hyperglycemic, stopped steroids and adding insulin gtt once hypokalemia improves.
49 yo male currently incarcerated since January, with hx of Crohn's disease, HTN, anxiety, who presented initially to [facility] on 3/3/24 by ambulance with complains of dyspnea for 3 days. While in triage patient became unresponsive and suffered a cardiac arrest, was successfully resuscitated. He was intubated, central lines were placed in the left IJ and right groin, art line in the left groin. Patient found to have mitral, tricuspid, and aortic valve endocarditis, with preserved ejection fraction and severe MR and AoV regurgitation. CTA performed at [facility] showed splenic and renal infarcts, multifocal pneumonia, bilateral effusions, no PE. CT brain was poor quality but revealed a low-density lesion in the left centrum semiovale.  Patient was transferred to the MICU at [facility] on [facility] of 3[facility].    Clinically deteriorating with worsening distributive and cardiogenic shock in face of persistent Gram positive bacteremia, severe VHD MR/AR, with secondary acute systolic heart failure.  Complicated by ARDS requiring NMB and proning with severe respiratory acidosis and acute renal failure with refractory hyperkalemia.    Utilizing deep sedation with Propofol and Fentanyl gtts, in conjunction with neuromuscular blockade for ARDS   Prone positioned, to be supine at 10:00am. Oxygenation improved with proning, FiO2 weaned down to 70%, PEEP remains +15  Severe respiratory acidosis, ventilator was augmented overnight to maximize ventilation. Allowing for permissive hypercapnea to preserve Ppk/Pplt and limit risk for barotrauma. Driving pressure currently at goal 15.  Worsening two pressor shock overnight, actively titrating Norepinephrine gtt to keep MAP >65 and continuing on fixed-dose physiologic Vasopressin. Stress steroids resumed.  Bumex gtt resumed for cardiogenic shock with severe MR  Gastric tube to suction for drainage of significant residual gastric contents  Worsening oliguric renal failure despite diuretic challenge now with refractory hyperkalemia. HyperK treated pharmacologically without response. Unfortunately patient is not a candidate for RRT as it will not change his outcome as he is not a surgical candidate for definitive management of his primary underlying illness.  Blood cultures remain persistently positive for Enterococcus faecalis, with one culture positive for MRSE. Patient is very difficult to obtain peripheral blood cultures from, as such the MRSE is suspected to be a contaminant. Now with new fever, will send sputum culture. Continue antibiotics.  CT surgery consulted for definitive management, he is currently not planned for intervention and [facility] is requesting preoperative imaging studies and workup such as MR brain and cardiac catheterization. Unfortunately, patient is highly unstable and unable to undergo presurgical testing. Given the nature of his acute condition, he would need to be considered on a more urgent basis for potentially life-saving intervention. Surgical options to be further explored with CTS.  Continue insulin gtt, titrating to targeted -200 while critically ill  Heparin SC DVT ppx    Spoke with [place] [employer] at bedside, given that patient is incarcerated his family is not permitted to visit. However, given the change in his clinical status with high risk for cardiac arrest at any moment with imminent death likely, his mother and possibly other family will be permitted to visit. Family is to call the intermediate where he is being detained and explain the situation to be granted visitation privileges.    Above assessment and plan formulated with ICU Attending Dr. Menezes on multidisciplinary rounds.
47 yo male currently incarcerated since January, with hx of Crohn's disease, HTN, anxiety, who presented initially to [facility] on 3/3/24 by ambulance with complains of dyspnea for 3 days. While in triage patient became unresponsive and suffered a cardiac arrest, was successfully resuscitated. He was intubated, central lines were placed in the left IJ and right groin, art line in the left groin. Patient found to have mitral, tricuspid, and aortic valve endocarditis, with preserved ejection fraction and severe MR and AoV regurgitation. CTA performed at [facility] showed splenic and renal infarcts, multifocal pneumonia, bilateral effusions, no PE. CT brain was poor quality but revealed a low-density lesion in the left centrum semiovale.  Patient was transferred to the MICU at [facility] on the evening of 3/4/24 accompanied by [facility] for CT surgery evaluation.    Neuro: Deeply sedated on Propofol. Mentation is poor off sedation with a focal deficit in RUE. CT head concerning for an acute left sided infarct, likely septic in etiology though cannot exclude sequela of anoxic brain injury. Will eventually require additional imaging, however is not stable at present time. Plan for cvEEG this morning to r/o NSCE. Neurology consult to be obtained.  CV: Shock state likely multifactorial due primarily to cardiogenic shock with a mixed component of distributive shock. POCUS with acute systolic heart failure. Formal TTE was obtained which confirmed acutely reduced EF in the face of severe MR / AR. Titrating Norepinephrine gtt to targeted MAP goal >65. Added Lasix bolus and infusion for cardiogenic shock with improvement in markers of end organ perfusion. Stress steroids added.  Pulm: Full vent support, Tv decreased to 450cc and FiO2 weaned to 30%  GI: Holding TF due to unstable shock state. Transaminitis improving.  Renal: Renal indices lateral but UOP has improved in response to diuretics, will continue to monitor in response to above therapies.  ID: Blood cultures from [facility] growing Enterococcus faecalis, sensitivies pending. Due to concern for CNS involvement, Zosyn augmented to Ceftriaxone/Ampicillin in conjunction with Vancomycin renally adjusted (random level from [facility] 12.9 on 750mg BID). Repeat blood cultures sent and are pending.  Heme: Heparin SC for DVT ppx  Endo: Hyperglycemic, started ISS coverage   PV: Concern for LLE ischemia due to septic emboli to limb. LLE arterial duplex obtained and is pending.     At this point, patient is critically ill with multiorgan failure secondary to acute infective endocarditis of multiple valves with severe MR/AR and now acute systolic heart failure as a result. CT surgery was consulted to evaluate for valve replacement surgery, however we are awaiting further neurologic workup and imagining to further evaluate brain lesion suspicious for septic infarct on CT to determine surgical candidacy. Will continue antibiotics and all of the above life supportive measures in an effort to sustain his life in the interim. His prognosis is highly guarded.    Above formulated with ICU Attending Dr. Menezes on multidisciplinary rounds.      
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    # multiple valve endocarditis  - grave prognosis  - cardiac surgery -  not a candiate for surgery  - antibiotics per infectious diseases  - repeat cultures, currently + gram + cocci    # vent dependence  - poor prognosis for meaningful weaning    # Acute kidney injury  - supportive measures  - trend creatinine   - avoid nephrotoxins   - initiation of dialysis     # Encounter for palliative care  - see goals of care  - grave prognosis

## 2024-03-07 NOTE — PROGRESS NOTE ADULT - SUBJECTIVE AND OBJECTIVE BOX
OVERNIGHT EVENTS:    Present Symptoms:     Dyspnea: Mild Moderate Severe  Nausea/Vomiting: Yes No  Anxiety:  Yes No  Depression: Yes No  Fatigue: Yes No  Loss of appetite: Yes No  Constipation:     Pain:             Character-            Duration-            Effect-            Factors-            Frequency-            Location-            Severity-    Pain AD Score:  [email or web address] (press ctrl + left click to view)    Review of Systems: Reviewed                     Negative:                     Positive:  Unable to obtain due to poor mentation   All others negative    MEDICATIONS  (STANDING):  aMIOdarone IVPB 150 milliGRAM(s) IV Intermittent once  ampicillin  IVPB 2 Gram(s) IV Intermittent every 6 hours  buMETAnide Infusion 2 mG/Hr (10 mL/Hr) IV Continuous <Continuous>  calcium gluconate IVPB 2 Gram(s) IV Intermittent once  cefTRIAXone Injectable. 2000 milliGRAM(s) IV Push every 12 hours  cefTRIAXone Injectable.      chlorhexidine 0.12% Liquid 15 milliLiter(s) Oral Mucosa every 12 hours  chlorhexidine 2% Cloths 1 Application(s) Topical daily  cisatracurium Infusion 2 MICROgram(s)/kG/Min (9.6 mL/Hr) IV Continuous <Continuous>  CRRT Treatment    <Continuous>  dextrose 5%. 1000 milliLiter(s) (50 mL/Hr) IV Continuous <Continuous>  dextrose 5%. 1000 milliLiter(s) (100 mL/Hr) IV Continuous <Continuous>  dextrose 50% Injectable 25 Gram(s) IV Push once  dextrose 50% Injectable 12.5 Gram(s) IV Push once  dextrose 50% Injectable 25 Gram(s) IV Push once  dextrose Oral Gel 15 Gram(s) Oral once  fentaNYL   Infusion. 1.5 MICROgram(s)/kG/Hr (12 mL/Hr) IV Continuous <Continuous>  glucagon  Injectable 1 milliGRAM(s) IntraMuscular once  heparin   Injectable 5000 Unit(s) SubCutaneous every 8 hours  hydrocortisone sodium succinate Injectable 50 milliGRAM(s) IV Push every 6 hours  insulin lispro (ADMELOG) corrective regimen sliding scale   SubCutaneous every 6 hours  norepinephrine Infusion 0.05 MICROgram(s)/kG/Min (3.75 mL/Hr) IV Continuous <Continuous>  pantoprazole  Injectable 40 milliGRAM(s) IV Push daily  petrolatum Ophthalmic Ointment 1 Application(s) Both EYES every 6 hours  Phoxillum Filtration BK 4 / 2.5 5000 milliLiter(s) (1000 mL/Hr) CRRT <Continuous>  Phoxillum Filtration BK 4 / 2.5 5000 milliLiter(s) (500 mL/Hr) CRRT <Continuous>  PrismaSATE Dialysate BK 0 / 3.5 5000 milliLiter(s) (1500 mL/Hr) CRRT <Continuous>  propofol Infusion 40 MICROgram(s)/kG/Min (19.2 mL/Hr) IV Continuous <Continuous>  sodium bicarbonate  Injectable 50 milliEquivalent(s) IV Push once  sodium bicarbonate  Injectable 50 milliEquivalent(s) IV Push once  vasopressin Infusion 0.04 Unit(s)/Min (6 mL/Hr) IV Continuous <Continuous>    MEDICATIONS  (PRN):  dextrose Oral Gel 15 Gram(s) Oral once PRN Blood Glucose LESS THAN 70 milliGRAM(s)/deciliter  HYDROmorphone  Injectable 2 milliGRAM(s) IV Push every 2 hours PRN pain or respiratory distress  sodium chloride 0.9% lock flush 10 milliLiter(s) IV Push every 1 hour PRN Pre/post blood products, medications, blood draw, and to maintain line patency      PHYSICAL EXAM:    Vital Signs Last 24 Hrs  T(C): 38.1 (07 Mar 2024 12:15), Max: 38.8 (06 Mar 2024 22:45)  T(F): 100.6 (07 Mar 2024 12:15), Max: 101.8 (06 Mar 2024 22:45)  HR: 120 (07 Mar 2024 12:15) (114 - 140)  BP: --  BP(mean): --  RR: 26 (07 Mar 2024 12:15) (0 - 32)  SpO2: 100% (07 Mar 2024 12:15) (78% - 110%)    Parameters below as of 07 Mar 2024 12:00  Patient On (Oxygen Delivery Method): ventilator        General: alert  oriented x ____ lethargic agitated                  cachexia  nonverbal  coma    Karnofsky:  %    HEENT: normal  dry mouth  ET tube/trach    Lungs: comfortable tachypnea/labored breathing  excessive secretions    CV: normal  tachycardia    GI: normal  distended  tender  no BS               PEG/NG/OG tube  constipation  last BM:     : normal  incontinent  oliguria/anuria  jewell    MSK: normal  weakness  edema             ambulatory  bedbound/wheelchair bound    Skin: normal  pressure ulcers- Stage_____  no rash    LABS:                          8.6    36.73 )-----------( 409      ( 07 Mar 2024 02:50 )             29.3     03-07    142  |  101  |  58.9<H>  ----------------------------<  127<H>  6.4<HH>   |  31.0<H>  |  1.74<H>    Ca    7.3<L>      07 Mar 2024 06:36  Phos  9.8     03-07  Mg     2.7     03-07    TPro  6.4<L>  /  Alb  2.2<L>  /  TBili  0.5  /  DBili  0.4<H>  /  AST  425<H>  /  ALT  914<H>  /  AlkPhos  242<H>  03-07    PT/INR - ( 06 Mar 2024 21:45 )   PT: 16.5 sec;   INR: 1.50 ratio         PTT - ( 06 Mar 2024 21:45 )  PTT:22.6 sec  Urinalysis Basic - ( 07 Mar 2024 06:36 )    Color: x / Appearance: x / SG: x / pH: x  Gluc: 127 mg/dL / Ketone: x  / Bili: x / Urobili: x   Blood: x / Protein: x / Nitrite: x   Leuk Esterase: x / RBC: x / WBC x   Sq Epi: x / Non Sq Epi: x / Bacteria: x      I&O's Summary    06 Mar 2024 07:01  -  07 Mar 2024 07:00  --------------------------------------------------------  IN: 3014.7 mL / OUT: 1390 mL / NET: 1624.7 mL    07 Mar 2024 07:01  -  07 Mar 2024 13:04  --------------------------------------------------------  IN: 570 mL / OUT: 265 mL / NET: 305 mL        RADIOLOGY & ADDITIONAL STUDIES:    ADVANCE DIRECTIVES/TREATMENT PREFERENCES:  DNR YES NO  Completed on:                     MOLST  YES NO   Completed on:  Living Will  YES NO   Completed on: OVERNIGHT EVENTS doing poorly.     Present Symptoms:         Dyspnea: none   Nausea/Vomiting: No  Anxiety:  unable   Depression: unable   Fatigue: unable   Loss of appetite: unable   Constipation: none     Pain: no         Character-            Duration-            Effect-            Factors-            Frequency-            Location-            Severity-    Pain AD Score:  [email or web address] (press ctrl + left click to view)    Review of Systems: Reviewed              Unable to obtain due to poor mentation   All others negative    MEDICATIONS  (STANDING):  aMIOdarone IVPB 150 milliGRAM(s) IV Intermittent once  ampicillin  IVPB 2 Gram(s) IV Intermittent every 6 hours  buMETAnide Infusion 2 mG/Hr (10 mL/Hr) IV Continuous <Continuous>  calcium gluconate IVPB 2 Gram(s) IV Intermittent once  cefTRIAXone Injectable. 2000 milliGRAM(s) IV Push every 12 hours  cefTRIAXone Injectable.      chlorhexidine 0.12% Liquid 15 milliLiter(s) Oral Mucosa every 12 hours  chlorhexidine 2% Cloths 1 Application(s) Topical daily  cisatracurium Infusion 2 MICROgram(s)/kG/Min (9.6 mL/Hr) IV Continuous <Continuous>  CRRT Treatment    <Continuous>  dextrose 5%. 1000 milliLiter(s) (50 mL/Hr) IV Continuous <Continuous>  dextrose 5%. 1000 milliLiter(s) (100 mL/Hr) IV Continuous <Continuous>  dextrose 50% Injectable 25 Gram(s) IV Push once  dextrose 50% Injectable 12.5 Gram(s) IV Push once  dextrose 50% Injectable 25 Gram(s) IV Push once  dextrose Oral Gel 15 Gram(s) Oral once  fentaNYL   Infusion. 1.5 MICROgram(s)/kG/Hr (12 mL/Hr) IV Continuous <Continuous>  glucagon  Injectable 1 milliGRAM(s) IntraMuscular once  heparin   Injectable 5000 Unit(s) SubCutaneous every 8 hours  hydrocortisone sodium succinate Injectable 50 milliGRAM(s) IV Push every 6 hours  insulin lispro (ADMELOG) corrective regimen sliding scale   SubCutaneous every 6 hours  norepinephrine Infusion 0.05 MICROgram(s)/kG/Min (3.75 mL/Hr) IV Continuous <Continuous>  pantoprazole  Injectable 40 milliGRAM(s) IV Push daily  petrolatum Ophthalmic Ointment 1 Application(s) Both EYES every 6 hours  Phoxillum Filtration BK 4 / 2.5 5000 milliLiter(s) (1000 mL/Hr) CRRT <Continuous>  Phoxillum Filtration BK 4 / 2.5 5000 milliLiter(s) (500 mL/Hr) CRRT <Continuous>  PrismaSATE Dialysate BK 0 / 3.5 5000 milliLiter(s) (1500 mL/Hr) CRRT <Continuous>  propofol Infusion 40 MICROgram(s)/kG/Min (19.2 mL/Hr) IV Continuous <Continuous>  sodium bicarbonate  Injectable 50 milliEquivalent(s) IV Push once  sodium bicarbonate  Injectable 50 milliEquivalent(s) IV Push once  vasopressin Infusion 0.04 Unit(s)/Min (6 mL/Hr) IV Continuous <Continuous>    MEDICATIONS  (PRN):  dextrose Oral Gel 15 Gram(s) Oral once PRN Blood Glucose LESS THAN 70 milliGRAM(s)/deciliter  HYDROmorphone  Injectable 2 milliGRAM(s) IV Push every 2 hours PRN pain or respiratory distress  sodium chloride 0.9% lock flush 10 milliLiter(s) IV Push every 1 hour PRN Pre/post blood products, medications, blood draw, and to maintain line patency      PHYSICAL EXAM:    Vital Signs Last 24 Hrs  T(C): 38.1 (07 Mar 2024 12:15), Max: 38.8 (06 Mar 2024 22:45)  T(F): 100.6 (07 Mar 2024 12:15), Max: 101.8 (06 Mar 2024 22:45)  HR: 120 (07 Mar 2024 12:15) (114 - 140)  BP: --  BP(mean): --  RR: 26 (07 Mar 2024 12:15) (0 - 32)  SpO2: 100% (07 Mar 2024 12:15) (78% - 110%)    Parameters below as of 07 Mar 2024 12:00  Patient On (Oxygen Delivery Method): ventilator       LABS:  General:lethargic     HEENT:  ET tube    Lungs: comfortable     CV: normal      GI: OG tube     : jewell    MSK: weakness     Neuro: no focal deficits    Skin: no rash                            8.6    36.73 )-----------( 409      ( 07 Mar 2024 02:50 )             29.3     03-07    142  |  101  |  58.9<H>  ----------------------------<  127<H>  6.4<HH>   |  31.0<H>  |  1.74<H>    Ca    7.3<L>      07 Mar 2024 06:36  Phos  9.8     03-07  Mg     2.7     03-07    TPro  6.4<L>  /  Alb  2.2<L>  /  TBili  0.5  /  DBili  0.4<H>  /  AST  425<H>  /  ALT  914<H>  /  AlkPhos  242<H>  03-07    PT/INR - ( 06 Mar 2024 21:45 )   PT: 16.5 sec;   INR: 1.50 ratio         PTT - ( 06 Mar 2024 21:45 )  PTT:22.6 sec  Urinalysis Basic - ( 07 Mar 2024 06:36 )    Color: x / Appearance: x / SG: x / pH: x  Gluc: 127 mg/dL / Ketone: x  / Bili: x / Urobili: x   Blood: x / Protein: x / Nitrite: x   Leuk Esterase: x / RBC: x / WBC x   Sq Epi: x / Non Sq Epi: x / Bacteria: x      I&O's Summary    06 Mar 2024 07:01  -  07 Mar 2024 07:00  --------------------------------------------------------  IN: 3014.7 mL / OUT: 1390 mL / NET: 1624.7 mL    07 Mar 2024 07:01  -  07 Mar 2024 13:04  --------------------------------------------------------  IN: 570 mL / OUT: 265 mL / NET: 305 mL        RADIOLOGY & ADDITIONAL STUDIES:    ADVANCE DIRECTIVES/TREATMENT PREFERENCES:  DNR YES NO  Completed on:                     MOLST  YES NO   Completed on:  Living Will  YES NO   Completed on:

## 2024-03-07 NOTE — DISCHARGE NOTE FOR THE EXPIRED PATIENT - HOSPITAL COURSE
47 yo male currently incarcerated, with hx of Crohn's disease, HTN, anxiety, presented initially to [facility] on 3/3/24 by ambulance with complains of dyspnea for 3 days, While in triage patient became unresponsive and suffered a cardiac arrest, was successfully resuscitated.  He was intubated, central lines were placed in the left IJ and right groin, art line in the left groin.  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  Patient was transferred to the MICU at [facility] on the evening of 3/4/24 accompanied by [facility] for evaluation by cardiothoracic surgery. Patient continued to require ventilator and vasopressor support. Blood cultures grew enteroococcus. He was maintained on broad spectrum antibiotics. He developed worsening respiratory failure and ARDS requiring deep sedation, paralysis on Nimbex, and proning on 3/6/24. Patient developed worsening renal failure with decreased urine output and soon developed hyperkalemia resistant to medical therapies. His mother consented to dialysis and hemodialysis catheter placement. Prior to initiation of dialysis, patient developed runs of V-tach. He was started on amiodarone, bicarbonate and calcium gluconate but then went into sustain V-tach with a pulse but with severe hypotension despite vasopressors. He was converted x1 at 200J and converted to NSR. He then went into PEA arrest and CPR was started with ROSC achieved after 2-4 minutes while pushing additional medications to temporize hyperkalemia. Patient's mother was at bedside. Patient then went into sustained ventricular tachycardia again at which point family elected to shift to DNR. Patient  on 3/7/2024 at 13:39.  49 yo male currently incarcerated, with hx of Crohn's disease, HTN, anxiety, presented initially to [facility] on 3/3/24 by ambulance with complains of dyspnea for 3 days, While in triage patient became unresponsive and suffered a cardiac arrest, was successfully resuscitated.  He was intubated, central lines were placed in the left IJ and right groin, art line in the left groin.  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  Patient was transferred to the MICU at [facility] on the evening of 3/4/24 accompanied by [facility] for evaluation by cardiothoracic surgery. Blood cultures grew Enterococcus with blood culture positive 3/4; 3/6 culture NGTD. He was maintained on ceftriaxone/ampicillin per ID recs. He was evaluated by thoracic surgery and was deemed poor surgical candidate given critically ill state with ongoing positive blood cultures. He developed worsening respiratory failure with ARDS requiring deep sedation and paralysis on Nimbex, and proning on 3/6/24. Patient developed worsening renal failure with decreased urine output and hyperkalemia resistant to medical therapies. He was planned for initiation of dialysis but prior to catheter placement, patient developed runs of V-tach. Due to sustained V-tach with a pulse but with severe hypotension he underwent synchronized cardioversion x1 200J and converted to NSR. He remained hypotensive despite conversion with eventual progression to PEA arrest despite rapid escalation of vasopressors and CPR was started with ROSC achieved after ~2 minutes. He had recurrent episode of Vtach post ROSC with additional synchronized cardioversion completed and was on max dose levo/juan and vaso. He was given amiodarone bolus. He received multiple rounds of calcium/insulin for hyperkalemia, juan pushes and bicarb. Discussed with mother alongside palliative care that he is actively dying despite maximal medical therapy and is unlikely to benefit from additional resuscitation as the underlying problem remains and if resuscitated it is expected to be transient. Son Darryl had previously deferred decision making to patient's mother. She was also advised that given his degree of hypotension that attempting CRRT could result in death. She reported understanding and agreed with transition to DNR. Pt then went into sustained ventricular tachycardia again and DNR was respected. Patient  on 3/7/2024 at 13:39. Pt was expected by the medical examiner with case number .  47 yo male currently incarcerated, with hx of Crohn's disease, HTN, anxiety, [place] [facility] [place]24 by ambulance with complains of dyspnea for 3 days, While in triage patient became unresponsive and suffered a cardiac arrest, was successfully resuscitated.  He was intubated, central lines were placed in the left IJ and right groin, art line in the left groin.  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  Patient was transferred to the MICU at [facility] on the evening of 3/4/24 accompanied by [facility] for evaluation by cardiothoracic surgery. Blood cultures grew Enterococcus with blood culture positive 3/4; 3/6 culture NGTD. He was maintained on ceftriaxone/ampicillin per ID recs. He was evaluated by thoracic surgery and was deemed poor surgical candidate given critically ill state with ongoing positive blood cultures and concern for reseeding if valve replacement pursued. He developed worsening respiratory failure with ARDS requiring deep sedation and paralysis on Nimbex, and proning on 3/6/24. Patient developed worsening renal failure with decreased urine output and hyperkalemia resistant to medical therapies. He was planned for initiation of dialysis but prior to catheter placement, patient developed runs of V-tach. Due to sustained V-tach with a pulse but with severe hypotension he underwent synchronized cardioversion x1 200J and converted to NSR. He remained hypotensive despite conversion with eventual progression to PEA arrest despite rapid escalation of vasopressors and CPR was started with ROSC achieved after ~2 minutes. He had recurrent episode of Vtach post ROSC with additional synchronized cardioversion completed and was on max dose levo/juan and vaso. He was given amiodarone bolus. He received multiple rounds of calcium/insulin for hyperkalemia, juan pushes and bicarb. Discussed with mother alongside palliative care that he is actively dying despite maximal medical therapy and is unlikely to benefit from additional resuscitation as the underlying problem remains and if resuscitated it is expected to be transient. Son Darryl had previously deferred decision making to patient's mother. She was also advised that given his degree of hypotension that attempting CRRT could result in death. She reported understanding and agreed with transition to DNR. Pt then went into sustained ventricular tachycardia again and DNR was respected. Patient  on 3/7/2024 at 13:39. Pt was expected by the medical examiner with case number .

## 2024-03-10 LAB — GRAM STN FLD: ABNORMAL

## 2024-03-11 LAB
CULTURE RESULTS: ABNORMAL
CULTURE RESULTS: SIGNIFICANT CHANGE UP
SPECIMEN SOURCE: SIGNIFICANT CHANGE UP
SPECIMEN SOURCE: SIGNIFICANT CHANGE UP

## 2024-06-27 NOTE — ED PROVIDER NOTE - PHYSICAL EXAMINATION
Gastroenterology Note             Pre-operative History and Physical    Patient: Teddy Flanagan  : 1958  CSN:     History Obtained From:  patient and/or guardian.     HISTORY OF PRESENT ILLNESS:    The patient is a 66 y.o. male with history of colon polyp here for high risk screening colonoscopy.      Past Medical History:    Past Medical History:   Diagnosis Date    RA (rheumatoid arthritis) (HCC)      Past Surgical History:    Past Surgical History:   Procedure Laterality Date    KNEE SURGERY      bilateral knee repair    TONSILLECTOMY      TOTAL KNEE ARTHROPLASTY Left      Medications Prior to Admission:   No current facility-administered medications on file prior to encounter.     Current Outpatient Medications on File Prior to Encounter   Medication Sig Dispense Refill    amoxicillin-clavulanate (AUGMENTIN) 125-31.25 MG/5ML suspension Take by mouth 2 times daily      zolpidem (AMBIEN) 10 MG tablet Take by mouth nightly as needed for Sleep.      Magnesium Citrate 200 MG TABS Take 250 mg by mouth daily      Multiple Vitamins-Minerals (CENTRUM SILVER 50+MEN PO) Take by mouth      dorzolamide-timolol (COSOPT) 2-0.5 % ophthalmic solution 1 drop 2 times daily      Tafluprost, PF, 0.0015 % SOLN Apply to eye      loteprednol (LOTEMAX) 0.5 % ophthalmic suspension 1 drop 4 times daily      famciclovir (FAMVIR) 500 MG tablet Take 1 tablet by mouth daily      brimonidine (ALPHAGAN) 0.2 % ophthalmic solution 1 drop 3 times daily      ganciclovir (ZIRGAN) 0.15 % GEL ophthalmic gel Place into both eyes every 3 hours      RINVOQ 15 MG TB24 TAKE 1 TABLET DAILY 30 tablet 2    Naproxen Sodium 220 MG CAPS Take 220 mg by mouth. 3-4times weekly  (Patient not taking: Reported on 2023)          Allergies:  Patient has no known allergies.      Social History:   Social History     Tobacco Use    Smoking status: Former     Current packs/day: 0.00     Types: Cigarettes     Quit date: 1995     Years since  General: nontoxic appearing in no acute distress, alert and cooperative  Head: Normocephalic, atraumatic  Eyes: PERRLA, no conjunctival injection, no scleral icterus, EOMI  ENMT: Atraumatic external nose and ears  Neck: Soft and supple  Cardiac: Regular rate and regular rhythm, no murmurs, peripheral pulses 2+ and symmetric in all extremities, no LE edema.  Resp: Unlabored respiratory effort, lungs CTAB, no wheezes  Abd: Soft, non-tender, non-distended  MSK: Spine midline and non-tender  Skin: Warm and dry, no rashes/abrasions/lacerations  Neuro: AO x 3, moves all extremities symmetrically, Motor strength 5/5 bilaterally UE and LE, sensation grossly intact

## 2025-01-20 NOTE — ED ADULT NURSE NOTE - NS TRANSFER PATIENT BELONGINGS
Received [employer] message from patient requesting refill of  lorazepam and hydromorphone .     Last refill of lorazepam: 12/31/24  Last refill of hydromorphone: 1/4/25  Last office visit: 12/17/24  Scheduled for follow up 1/21/25     Will route request to MD/ for review.     Reviewed [place]  Report.    
Clothing
